# Patient Record
Sex: FEMALE | Race: WHITE | URBAN - METROPOLITAN AREA
[De-identification: names, ages, dates, MRNs, and addresses within clinical notes are randomized per-mention and may not be internally consistent; named-entity substitution may affect disease eponyms.]

---

## 2017-03-02 ENCOUNTER — ON CAMPUS - OUTPATIENT (OUTPATIENT)
Dept: URBAN - METROPOLITAN AREA HOSPITAL 85 | Facility: HOSPITAL | Age: 52
End: 2017-03-02

## 2017-03-02 ENCOUNTER — HOSPITAL ENCOUNTER (OUTPATIENT)
Dept: PREOP | Facility: HOSPITAL | Age: 52
Setting detail: HOSPITAL OUTPATIENT SURGERY
Discharge: HOME | End: 2017-03-02
Attending: INTERNAL MEDICINE | Admitting: INTERNAL MEDICINE

## 2017-03-02 DIAGNOSIS — K57.90 DIVERTICULOSIS OF INTESTINE, PART UNSPECIFIED, WITHOUT PERFO: ICD-10-CM

## 2017-03-02 DIAGNOSIS — Z12.11 ENCOUNTER FOR SCREENING FOR MALIGNANT NEOPLASM OF COLON: ICD-10-CM

## 2017-03-02 DIAGNOSIS — K63.5 POLYP OF COLON: ICD-10-CM

## 2017-03-02 PROCEDURE — 45385 COLONOSCOPY W/LESION REMOVAL: CPT | Mod: 33 | Performed by: INTERNAL MEDICINE

## 2018-04-11 ENCOUNTER — TRANSCRIBE ORDERS (OUTPATIENT)
Dept: ADMINISTRATIVE | Facility: HOSPITAL | Age: 53
End: 2018-04-11

## 2018-04-11 DIAGNOSIS — R10.9 ABDOMINAL PAIN, UNSPECIFIED ABDOMINAL LOCATION: Primary | ICD-10-CM

## 2018-12-31 ENCOUNTER — HOSPITAL ENCOUNTER (OUTPATIENT)
Dept: FAMILY MEDICINE CLINIC | Facility: CLINIC | Age: 53
Setting detail: SPECIMEN
Discharge: HOME | End: 2018-12-31
Attending: NURSE PRACTITIONER | Admitting: NURSE PRACTITIONER

## 2018-12-31 LAB
ALBUMIN SERPL-MCNC: 3.8 G/DL (ref 3.5–4.8)
ALBUMIN/GLOB SERPL: 1.3 {RATIO} (ref 1–1.7)
ALP SERPL-CCNC: 52 IU/L (ref 32–91)
ALT SERPL-CCNC: 10 IU/L (ref 14–54)
ANION GAP SERPL CALC-SCNC: 14 MMOL/L (ref 10–20)
AST SERPL-CCNC: 17 IU/L (ref 15–41)
BASOPHILS # BLD AUTO: 0.1 10*3/UL (ref 0–0.2)
BASOPHILS NFR BLD AUTO: 1 % (ref 0–2)
BILIRUB SERPL-MCNC: 0.4 MG/DL (ref 0.3–1.2)
BUN SERPL-MCNC: 7 MG/DL (ref 8–20)
BUN/CREAT SERPL: 8.8 (ref 5.4–26.2)
CALCIUM SERPL-MCNC: 8.8 MG/DL (ref 8.9–10.3)
CHLORIDE SERPL-SCNC: 96 MMOL/L (ref 101–111)
CHOLEST SERPL-MCNC: 190 MG/DL
CHOLEST/HDLC SERPL: 3.3 {RATIO}
CONV CO2: 26 MMOL/L (ref 22–32)
CONV LDL CHOLESTEROL DIRECT: 128 MG/DL (ref 0–100)
CONV TOTAL PROTEIN: 6.7 G/DL (ref 6.1–7.9)
CREAT UR-MCNC: 0.8 MG/DL (ref 0.4–1)
DIFFERENTIAL METHOD BLD: (no result)
EOSINOPHIL # BLD AUTO: 0.3 10*3/UL (ref 0–0.3)
EOSINOPHIL # BLD AUTO: 3 % (ref 0–3)
ERYTHROCYTE [DISTWIDTH] IN BLOOD BY AUTOMATED COUNT: 13 % (ref 11.5–14.5)
GLOBULIN UR ELPH-MCNC: 2.9 G/DL (ref 2.5–3.8)
GLUCOSE SERPL-MCNC: 88 MG/DL (ref 65–99)
HCT VFR BLD AUTO: 39.8 % (ref 35–49)
HDLC SERPL-MCNC: 58 MG/DL
HGB BLD-MCNC: 13.3 G/DL (ref 12–15)
LDLC/HDLC SERPL: 2.2 {RATIO}
LIPID INTERPRETATION: ABNORMAL
LYMPHOCYTES # BLD AUTO: 1.2 10*3/UL (ref 0.8–4.8)
LYMPHOCYTES NFR BLD AUTO: 10 % (ref 18–42)
MCH RBC QN AUTO: 32.3 PG (ref 26–32)
MCHC RBC AUTO-ENTMCNC: 33.5 G/DL (ref 32–36)
MCV RBC AUTO: 96.3 FL (ref 80–94)
MONOCYTES # BLD AUTO: 0.7 10*3/UL (ref 0.1–1.3)
MONOCYTES NFR BLD AUTO: 6 % (ref 2–11)
NEUTROPHILS # BLD AUTO: 9.6 10*3/UL (ref 2.3–8.6)
NEUTROPHILS NFR BLD AUTO: 80 % (ref 50–75)
NRBC BLD AUTO-RTO: 0 /100{WBCS}
NRBC/RBC NFR BLD MANUAL: 0 10*3/UL
PLATELET # BLD AUTO: 341 10*3/UL (ref 150–450)
PMV BLD AUTO: 7.5 FL (ref 7.4–10.4)
POTASSIUM SERPL-SCNC: 4 MMOL/L (ref 3.6–5.1)
RBC # BLD AUTO: 4.13 10*6/UL (ref 4–5.4)
SODIUM SERPL-SCNC: 132 MMOL/L (ref 136–144)
TRIGL SERPL-MCNC: 117 MG/DL
TSH SERPL-ACNC: 1 UIU/ML (ref 0.34–5.6)
VLDLC SERPL CALC-MCNC: 4 MG/DL
WBC # BLD AUTO: 12 10*3/UL (ref 4.5–11.5)

## 2019-01-31 ENCOUNTER — HOSPITAL ENCOUNTER (OUTPATIENT)
Dept: GENERAL RADIOLOGY | Facility: HOSPITAL | Age: 54
Discharge: HOME | End: 2019-01-31
Attending: NURSE PRACTITIONER | Admitting: NURSE PRACTITIONER

## 2019-05-30 ENCOUNTER — CONVERSION ENCOUNTER (OUTPATIENT)
Dept: FAMILY MEDICINE CLINIC | Facility: CLINIC | Age: 54
End: 2019-05-30

## 2019-06-04 VITALS
SYSTOLIC BLOOD PRESSURE: 140 MMHG | DIASTOLIC BLOOD PRESSURE: 76 MMHG | RESPIRATION RATE: 16 BRPM | WEIGHT: 228.38 LBS | BODY MASS INDEX: 32.77 KG/M2 | HEART RATE: 82 BPM

## 2019-06-07 NOTE — PROGRESS NOTES
History of Present Illness   History from: patient  Reason for visit: New pt HMR clinical  Chief Complaint: obesity  History of Present Illness: 55 y/o female; medical hx includes: obesity; HTN;       Vital Signs:    Patient Profile:    54 Years Old Female  CC:         obesity  Height:     70 inches  Weight:     228.38 pounds  (Measured Weight:  228 lbs. 6 oz.)  BMI:        32.89  Temp:       98.3 degrees F oral  Pulse rhythm:   regular  Resp:       16 per minute    Pulse Sittin /min  BP sittin / 76  (right arm)  Pulse Standin /min  BP standin / 80 (right arm)  Cuff size:  large   Vitals Entered By: Mara Rubio (May 30, 2019 2:49 PM)    Medications:  Medications were reviewed with the patient during this visit.    Allergies:   IBUPROFEN (IBUPROFEN) (Critical)  * LISNOPRIL (Critical)    Allergies were reviewed with the patient during this visit.          Blood Pressure:  Today's BP: 140/76 mm Hg    Labwork:   Most Recent Lab Results:   LDL: 128 mg/dL 2018      Treatment Plan (choose one)   Minimum Meal Replacement Prescriptions   Shakes per day: 4  Entrees per day: 2  HMR Multivitamins a day 2  Fruits & Vegetables per day 0  Potassium prescription given to patient N/A  Physical activity recommendations Limitations No  Special Instructions: pt instructed in  4+2 diet with emphasis on MIB: stated she understood instructions; reviewed potential side effects of diet including but not limited to: dizziness, lightheadedness, constipation, muscle cramps, fatigue,   anemia, menstrual irregularites and gallbladder disease; pt stated she understood; pt currently taking HCTZ for BP; discussed that she will probably have to discontinue this; states she had labs done in 2019; consent signed; plans to attend Thurs 1pm   class; will forward to Dr. ASIF Jeronimo for review    Active Medications (reviewed today):  BACTRIM -160 MG ORAL TABLET (SULFAMETHOXAZOLE-TRIMETHOPRIM) 1 po  bid  CONTRAVE 8-90 MG ORAL TABLET EXTENDED RELEASE 12 HOUR (NALTREXONE-BUPROPION HCL) one daily for 1 week then one bid for 1 week then 2 in am 1 pm for 1 week then two bid on going  HYDROCHLOROTHIAZIDE 25 MG TABLET (HYDROCHLOROTHIAZIDE) TAKE ONE TABLET BY MOUTH DAILY    Current Allergies (reviewed today):  IBUPROFEN (IBUPROFEN) (Critical)  * LISNOPRIL (Critical)      Medications Added to Medication List This Visit:  1)  Hmr Multivitamin  .... Take one (1) tablet by mouth twice a day        Electronically signed by Mara Rubio on 05/30/2019 at 3:39 PM  ________________________________________________________________________  Chart, meds & labs reviewed. Ok to start HMR program. Stop HCTZ. Medical management no required.      Electronically signed by Cholo Jeronimo MD on 05/31/2019 at 2:31 PM  ________________________________________________________________________       Disclaimer: Converted Note message may not contain all data elements that existed in the legacy source system. Please see [facility] for the original note details.

## 2020-01-14 ENCOUNTER — TELEPHONE (OUTPATIENT)
Dept: FAMILY MEDICINE CLINIC | Facility: CLINIC | Age: 55
End: 2020-01-14

## 2020-01-14 NOTE — TELEPHONE ENCOUNTER
Patient left a voice message that she had previously been taking Contrave, but stopped because the higher dosing made her dizzy.  Since then she has been doing some research and has read that people are doing well with taking 1-2 pills daily lower dose pills.  What do you think?  She is thinking about getting back on it.

## 2020-01-17 ENCOUNTER — TELEPHONE (OUTPATIENT)
Dept: FAMILY MEDICINE CLINIC | Facility: CLINIC | Age: 55
End: 2020-01-17

## 2020-04-23 RX ORDER — HYDROCHLOROTHIAZIDE 25 MG/1
TABLET ORAL
Qty: 90 TABLET | Refills: 2 | Status: SHIPPED | OUTPATIENT
Start: 2020-04-23 | End: 2021-06-01

## 2020-07-29 ENCOUNTER — TELEPHONE (OUTPATIENT)
Dept: FAMILY MEDICINE CLINIC | Facility: CLINIC | Age: 55
End: 2020-07-29

## 2020-07-29 NOTE — TELEPHONE ENCOUNTER
Spoke with patient and scheduled a physical with [facility] on 08/13/2020 at 10:30am.

## 2020-07-29 NOTE — TELEPHONE ENCOUNTER
Caller: Marjorie Maldonado    Relationship to patient: Self    Best call back number: [phone]    Chief complaint: INSURANCE WANTS HER TO GET A BIOMETRIC SCREENING FOR THE  PROGRAM    Type of visit: NOT SURE IF SHE NEEDS A VISIT OR JUST LAB WORK - PLEASE ADVISE AND SCHEDULE

## 2020-08-04 PROCEDURE — U0003 INFECTIOUS AGENT DETECTION BY NUCLEIC ACID (DNA OR RNA); SEVERE ACUTE RESPIRATORY SYNDROME CORONAVIRUS 2 (SARS-COV-2) (CORONAVIRUS DISEASE [COVID-19]), AMPLIFIED PROBE TECHNIQUE, MAKING USE OF HIGH THROUGHPUT TECHNOLOGIES AS DESCRIBED BY CMS-2020-01-R: HCPCS | Performed by: FAMILY MEDICINE

## 2020-08-06 ENCOUNTER — TELEPHONE (OUTPATIENT)
Dept: FAMILY MEDICINE CLINIC | Facility: CLINIC | Age: 55
End: 2020-08-06

## 2020-08-06 NOTE — TELEPHONE ENCOUNTER
PATIENT STATES SHE WENT TO URGENT CARE AND SHE GOT A POSITIVE COVID TEST . SHE WANTED TO KNOW IF SHE NEEDED ANY TREATMENT. SHE IS ON DAY 6, HER FEVER IS BETTER TODAY BUT HER CHEST STILL HURTS.    PLEASE ADVISE    [phone]

## 2020-08-07 NOTE — TELEPHONE ENCOUNTER
Spoke with pt. She did not have a chest xray done. She is pushing fluids, resting and tylenol/ibu prn

## 2020-08-27 ENCOUNTER — OFFICE VISIT (OUTPATIENT)
Dept: FAMILY MEDICINE CLINIC | Facility: CLINIC | Age: 55
End: 2020-08-27

## 2020-08-27 ENCOUNTER — LAB (OUTPATIENT)
Dept: FAMILY MEDICINE CLINIC | Facility: CLINIC | Age: 55
End: 2020-08-27

## 2020-08-27 VITALS
HEART RATE: 78 BPM | DIASTOLIC BLOOD PRESSURE: 90 MMHG | TEMPERATURE: 96.6 F | SYSTOLIC BLOOD PRESSURE: 128 MMHG | BODY MASS INDEX: 29.92 KG/M2 | WEIGHT: 209 LBS | HEIGHT: 70 IN

## 2020-08-27 DIAGNOSIS — Z12.39 SCREENING FOR BREAST CANCER: ICD-10-CM

## 2020-08-27 DIAGNOSIS — Z00.00 PREVENTATIVE HEALTH CARE: Primary | ICD-10-CM

## 2020-08-27 DIAGNOSIS — I10 ESSENTIAL HYPERTENSION: ICD-10-CM

## 2020-08-27 DIAGNOSIS — Z00.00 PREVENTATIVE HEALTH CARE: ICD-10-CM

## 2020-08-27 LAB
25(OH)D3 SERPL-MCNC: 44.1 NG/ML (ref 30–100)
ALBUMIN SERPL-MCNC: 3.9 G/DL (ref 3.5–5.2)
ALBUMIN/GLOB SERPL: 1.3 G/DL
ALP SERPL-CCNC: 69 U/L (ref 39–117)
ALT SERPL W P-5'-P-CCNC: 11 U/L (ref 1–33)
ANION GAP SERPL CALCULATED.3IONS-SCNC: 7.5 MMOL/L (ref 5–15)
AST SERPL-CCNC: 16 U/L (ref 1–32)
BASOPHILS # BLD AUTO: 0.03 10*3/MM3 (ref 0–0.2)
BASOPHILS NFR BLD AUTO: 0.5 % (ref 0–1.5)
BILIRUB SERPL-MCNC: 0.3 MG/DL (ref 0–1.2)
BUN SERPL-MCNC: 11 MG/DL (ref 6–20)
BUN/CREAT SERPL: 15.5 (ref 7–25)
CALCIUM SPEC-SCNC: 9 MG/DL (ref 8.6–10.5)
CHLORIDE SERPL-SCNC: 103 MMOL/L (ref 98–107)
CHOLEST SERPL-MCNC: 185 MG/DL (ref 0–200)
CO2 SERPL-SCNC: 27.5 MMOL/L (ref 22–29)
CREAT SERPL-MCNC: 0.71 MG/DL (ref 0.57–1)
DEPRECATED RDW RBC AUTO: 46.3 FL (ref 37–54)
EOSINOPHIL # BLD AUTO: 0.21 10*3/MM3 (ref 0–0.4)
EOSINOPHIL NFR BLD AUTO: 3.4 % (ref 0.3–6.2)
ERYTHROCYTE [DISTWIDTH] IN BLOOD BY AUTOMATED COUNT: 12.7 % (ref 12.3–15.4)
GFR SERPL CREATININE-BSD FRML MDRD: 86 ML/MIN/1.73
GLOBULIN UR ELPH-MCNC: 3.1 GM/DL
GLUCOSE SERPL-MCNC: 84 MG/DL (ref 65–99)
HCT VFR BLD AUTO: 38.4 % (ref 34–46.6)
HDLC SERPL-MCNC: 53 MG/DL (ref 40–60)
HGB BLD-MCNC: 12.2 G/DL (ref 12–15.9)
IMM GRANULOCYTES # BLD AUTO: 0.02 10*3/MM3 (ref 0–0.05)
IMM GRANULOCYTES NFR BLD AUTO: 0.3 % (ref 0–0.5)
LDLC SERPL CALC-MCNC: 112 MG/DL (ref 0–100)
LDLC/HDLC SERPL: 2.11 {RATIO}
LYMPHOCYTES # BLD AUTO: 1.21 10*3/MM3 (ref 0.7–3.1)
LYMPHOCYTES NFR BLD AUTO: 19.5 % (ref 19.6–45.3)
MCH RBC QN AUTO: 31 PG (ref 26.6–33)
MCHC RBC AUTO-ENTMCNC: 31.8 G/DL (ref 31.5–35.7)
MCV RBC AUTO: 97.7 FL (ref 79–97)
MONOCYTES # BLD AUTO: 0.56 10*3/MM3 (ref 0.1–0.9)
MONOCYTES NFR BLD AUTO: 9 % (ref 5–12)
NEUTROPHILS NFR BLD AUTO: 4.16 10*3/MM3 (ref 1.7–7)
NEUTROPHILS NFR BLD AUTO: 67.3 % (ref 42.7–76)
NRBC BLD AUTO-RTO: 0 /100 WBC (ref 0–0.2)
PLATELET # BLD AUTO: 294 10*3/MM3 (ref 140–450)
PMV BLD AUTO: 9.9 FL (ref 6–12)
POTASSIUM SERPL-SCNC: 4.4 MMOL/L (ref 3.5–5.2)
PROT SERPL-MCNC: 7 G/DL (ref 6–8.5)
RBC # BLD AUTO: 3.93 10*6/MM3 (ref 3.77–5.28)
SODIUM SERPL-SCNC: 138 MMOL/L (ref 136–145)
TRIGL SERPL-MCNC: 102 MG/DL (ref 0–150)
TSH SERPL DL<=0.05 MIU/L-ACNC: 2.26 UIU/ML (ref 0.27–4.2)
VLDLC SERPL-MCNC: 20.4 MG/DL (ref 5–40)
WBC # BLD AUTO: 6.19 10*3/MM3 (ref 3.4–10.8)

## 2020-08-27 PROCEDURE — 84443 ASSAY THYROID STIM HORMONE: CPT | Performed by: NURSE PRACTITIONER

## 2020-08-27 PROCEDURE — 80061 LIPID PANEL: CPT | Performed by: NURSE PRACTITIONER

## 2020-08-27 PROCEDURE — 99213 OFFICE O/P EST LOW 20 MIN: CPT | Performed by: NURSE PRACTITIONER

## 2020-08-27 PROCEDURE — 85025 COMPLETE CBC W/AUTO DIFF WBC: CPT | Performed by: NURSE PRACTITIONER

## 2020-08-27 PROCEDURE — 82306 VITAMIN D 25 HYDROXY: CPT | Performed by: NURSE PRACTITIONER

## 2020-08-27 PROCEDURE — 80053 COMPREHEN METABOLIC PANEL: CPT | Performed by: NURSE PRACTITIONER

## 2020-08-27 PROCEDURE — 99396 PREV VISIT EST AGE 40-64: CPT | Performed by: NURSE PRACTITIONER

## 2020-08-27 NOTE — ASSESSMENT & PLAN NOTE
Hypertension is worsening.  Dietary sodium restriction.  Weight loss.  Regular aerobic exercise.  Medication changes per orders.  Blood pressure will be reassessed in 4 weeks.  Repeat /96. Will restart HCTZ at 1/2 tab daily and follow up in 4 weeks.

## 2020-08-27 NOTE — PROGRESS NOTES
"Subjective   Marjorie Maldonado is a 55 y.o. female.     Chief Complaint   Patient presents with   • Annual Exam       /90 (BP Location: Right arm, Patient Position: Sitting, Cuff Size: Adult)   Pulse 78   Temp 96.6 °F (35.9 °C) (Temporal)   Ht 177.8 cm (70\")   Wt 94.8 kg (209 lb)   BMI 29.99 kg/m²     BP Readings from Last 3 Encounters:   20 128/90   20 163/94   20 135/88       Wt Readings from Last 3 Encounters:   20 94.8 kg (209 lb)   20 90.7 kg (200 lb)   20 90.7 kg (200 lb)       Pt comes in today for routine physical. She stopped taking her HCTZ about 6 months ago. Just didn't feel she needed it anymore and had heard it was dangerous to stay on BP meds long term.   She denies monitoring BP. Denies any symptoms of HTN    Recently diagnosed with covid. Feeling much better now. States several co-workers tested positive.        Past Surgical History:   Procedure Laterality Date   •  SECTION      x3        Social History     Socioeconomic History   • Marital status:      Spouse name: Not on file   • Number of children: 3   • Years of education: Not on file   • Highest education level: Not on file   Occupational History     Employer: [employer]   Tobacco Use   • Smoking status: Never Smoker   • Smokeless tobacco: Never Used   Substance and Sexual Activity   • Alcohol use: Never     Frequency: Never   • Drug use: Defer   • Sexual activity: Defer       The following portions of the patient's history were reviewed and updated as appropriate: allergies, current medications, past family history, past medical history, past social history, past surgical history and problem list.    Review of Systems   Constitutional: Negative for activity change, unexpected weight gain and unexpected weight loss.   Eyes: Negative for visual disturbance.   Respiratory: Negative for chest tightness and shortness of breath.    Cardiovascular: Negative for chest pain, " palpitations and leg swelling.   Gastrointestinal: Negative for abdominal pain, blood in stool, constipation, diarrhea and indigestion.   Endocrine: Negative for polydipsia and polyuria.   Genitourinary: Negative for difficulty urinating.   Skin: Negative for skin lesions.   Neurological: Negative for headache.   Psychiatric/Behavioral: Negative for agitation, sleep disturbance and stress.       Objective   Physical Exam   Constitutional: She is oriented to person, place, and time. She appears well-developed.   HENT:   Head: Normocephalic.   Eyes: Pupils are equal, round, and reactive to light. Conjunctivae are normal.   Neck: Neck supple. No thyromegaly present.   Cardiovascular: Normal rate and regular rhythm.   No murmur heard.  Pulmonary/Chest: Effort normal and breath sounds normal.   Abdominal: Soft. Bowel sounds are normal. She exhibits no mass. There is no tenderness.   Neurological: She is alert and oriented to person, place, and time.   Skin: Skin is warm and dry. No lesion noted.   Psychiatric: She has a normal mood and affect. Her behavior is normal.       Diagnoses and all orders for this visit:    1. Preventative health care (Primary)  -     CBC & Differential; Future  -     Comprehensive Metabolic Panel; Future  -     TSH; Future  -     Lipid Panel; Future  -     Vitamin D 25 Hydroxy; Future    2. Screening for breast cancer  -     Mammo Screening Digital Tomosynthesis Bilateral With CAD; Future    3. Essential hypertension  Assessment & Plan:  Hypertension is worsening.  Dietary sodium restriction.  Weight loss.  Regular aerobic exercise.  Medication changes per orders.  Blood pressure will be reassessed in 4 weeks.  Repeat /96. Will restart HCTZ at 1/2 tab daily and follow up in 4 weeks.     check labs  Mammogram in Jan  Restart HCTZ 12.5mg daily  Discussed importance of regular exercise and recommended starting or continuing a regular exercise program for good health. The patient was also  encouraged to lose weight for better health.   During this visit for their annual exam, we reviewed their personal history, social history and family history. We went over their medications and all the recommended health maintenance items for their age group. They were given the opportunity to ask questions and discuss other concerns.       Return in about 4 weeks (around 9/24/2020) for HTN follow up.

## 2021-06-01 RX ORDER — HYDROCHLOROTHIAZIDE 25 MG/1
TABLET ORAL
Qty: 90 TABLET | Refills: 1 | Status: SHIPPED | OUTPATIENT
Start: 2021-06-01 | End: 2021-11-08

## 2021-06-14 ENCOUNTER — TELEPHONE (OUTPATIENT)
Dept: FAMILY MEDICINE CLINIC | Facility: CLINIC | Age: 56
End: 2021-06-14

## 2021-06-14 NOTE — TELEPHONE ENCOUNTER
Spoke with patient and told her that she can stop by any time, except between 12-1pm, and have her bp checked by one of the medical assistants.

## 2021-06-14 NOTE — TELEPHONE ENCOUNTER
Caller: CHADDMarjorie    Relationship to patient: Self    Best call back number: [phone]     Chief complaint: BLOOD PRESSURE CHECK       Requested date: TODAY , TOMORROW OR WEDNSDAY ,THURSDAY      Additional notes:MS FLORENTINO WOULD LIKE TO KNOW IF SHE COULD COME BY OFFICE FROM TIME TO TIME TO HAVE HER BLOOD PRESSURE CHECKED.  WALK IN OR NURSE SCHEDULE?

## 2021-11-06 ENCOUNTER — HOSPITAL ENCOUNTER (EMERGENCY)
Facility: HOSPITAL | Age: 56
Discharge: HOME | End: 2021-11-06
Admitting: EMERGENCY MEDICINE

## 2021-11-06 ENCOUNTER — APPOINTMENT (OUTPATIENT)
Dept: CT IMAGING | Facility: HOSPITAL | Age: 56
End: 2021-11-06

## 2021-11-06 VITALS
OXYGEN SATURATION: 97 % | WEIGHT: 219.14 LBS | RESPIRATION RATE: 16 BRPM | HEIGHT: 70 IN | DIASTOLIC BLOOD PRESSURE: 78 MMHG | BODY MASS INDEX: 31.37 KG/M2 | HEART RATE: 88 BPM | TEMPERATURE: 98 F | SYSTOLIC BLOOD PRESSURE: 138 MMHG

## 2021-11-06 DIAGNOSIS — R53.1 WEAKNESS: Primary | ICD-10-CM

## 2021-11-06 DIAGNOSIS — Z71.1 PHYSICALLY WELL BUT WORRIED: ICD-10-CM

## 2021-11-06 LAB
ALBUMIN SERPL-MCNC: 4.3 G/DL (ref 3.5–5.2)
ALBUMIN/GLOB SERPL: 1.5 G/DL
ALP SERPL-CCNC: 72 U/L (ref 39–117)
ALT SERPL W P-5'-P-CCNC: 9 U/L (ref 1–33)
ANION GAP SERPL CALCULATED.3IONS-SCNC: 13 MMOL/L (ref 5–15)
AST SERPL-CCNC: 18 U/L (ref 1–32)
BASOPHILS # BLD AUTO: 0.1 10*3/MM3 (ref 0–0.2)
BASOPHILS NFR BLD AUTO: 0.6 % (ref 0–1.5)
BILIRUB SERPL-MCNC: 0.3 MG/DL (ref 0–1.2)
BUN SERPL-MCNC: 11 MG/DL (ref 6–20)
BUN/CREAT SERPL: 17.7 (ref 7–25)
CALCIUM SPEC-SCNC: 9 MG/DL (ref 8.6–10.5)
CHLORIDE SERPL-SCNC: 100 MMOL/L (ref 98–107)
CO2 SERPL-SCNC: 25 MMOL/L (ref 22–29)
CREAT SERPL-MCNC: 0.62 MG/DL (ref 0.57–1)
DEPRECATED RDW RBC AUTO: 41.6 FL (ref 37–54)
EOSINOPHIL # BLD AUTO: 0 10*3/MM3 (ref 0–0.4)
EOSINOPHIL NFR BLD AUTO: 0.2 % (ref 0.3–6.2)
ERYTHROCYTE [DISTWIDTH] IN BLOOD BY AUTOMATED COUNT: 12.8 % (ref 12.3–15.4)
GFR SERPL CREATININE-BSD FRML MDRD: 100 ML/MIN/1.73
GLOBULIN UR ELPH-MCNC: 2.8 GM/DL
GLUCOSE BLDC GLUCOMTR-MCNC: 148 MG/DL (ref 70–105)
GLUCOSE SERPL-MCNC: 107 MG/DL (ref 65–99)
HCT VFR BLD AUTO: 37.2 % (ref 34–46.6)
HGB BLD-MCNC: 13 G/DL (ref 12–15.9)
HOLD SPECIMEN: NORMAL
INR PPP: 0.99 (ref 0.93–1.1)
LYMPHOCYTES # BLD AUTO: 1.2 10*3/MM3 (ref 0.7–3.1)
LYMPHOCYTES NFR BLD AUTO: 12.7 % (ref 19.6–45.3)
MAGNESIUM SERPL-MCNC: 2 MG/DL (ref 1.6–2.6)
MCH RBC QN AUTO: 32.3 PG (ref 26.6–33)
MCHC RBC AUTO-ENTMCNC: 34.8 G/DL (ref 31.5–35.7)
MCV RBC AUTO: 92.8 FL (ref 79–97)
MONOCYTES # BLD AUTO: 0.5 10*3/MM3 (ref 0.1–0.9)
MONOCYTES NFR BLD AUTO: 5.1 % (ref 5–12)
NEUTROPHILS NFR BLD AUTO: 7.6 10*3/MM3 (ref 1.7–7)
NEUTROPHILS NFR BLD AUTO: 81.4 % (ref 42.7–76)
NRBC BLD AUTO-RTO: 0 /100 WBC (ref 0–0.2)
PLATELET # BLD AUTO: 340 10*3/MM3 (ref 140–450)
PMV BLD AUTO: 6.6 FL (ref 6–12)
POTASSIUM SERPL-SCNC: 4 MMOL/L (ref 3.5–5.2)
PROT SERPL-MCNC: 7.1 G/DL (ref 6–8.5)
PROTHROMBIN TIME: 11 SECONDS (ref 9.6–11.7)
RBC # BLD AUTO: 4.01 10*6/MM3 (ref 3.77–5.28)
SODIUM SERPL-SCNC: 138 MMOL/L (ref 136–145)
TSH SERPL DL<=0.05 MIU/L-ACNC: 0.72 UIU/ML (ref 0.27–4.2)
WBC # BLD AUTO: 9.3 10*3/MM3 (ref 3.4–10.8)

## 2021-11-06 PROCEDURE — 72125 CT NECK SPINE W/O DYE: CPT

## 2021-11-06 PROCEDURE — 82962 GLUCOSE BLOOD TEST: CPT

## 2021-11-06 PROCEDURE — 85610 PROTHROMBIN TIME: CPT | Performed by: NURSE PRACTITIONER

## 2021-11-06 PROCEDURE — 36415 COLL VENOUS BLD VENIPUNCTURE: CPT

## 2021-11-06 PROCEDURE — 70450 CT HEAD/BRAIN W/O DYE: CPT

## 2021-11-06 PROCEDURE — 80053 COMPREHEN METABOLIC PANEL: CPT | Performed by: NURSE PRACTITIONER

## 2021-11-06 PROCEDURE — 85025 COMPLETE CBC W/AUTO DIFF WBC: CPT | Performed by: NURSE PRACTITIONER

## 2021-11-06 PROCEDURE — 99283 EMERGENCY DEPT VISIT LOW MDM: CPT

## 2021-11-06 PROCEDURE — 83735 ASSAY OF MAGNESIUM: CPT | Performed by: NURSE PRACTITIONER

## 2021-11-06 PROCEDURE — 84443 ASSAY THYROID STIM HORMONE: CPT | Performed by: NURSE PRACTITIONER

## 2021-11-06 RX ORDER — SODIUM CHLORIDE 0.9 % (FLUSH) 0.9 %
10 SYRINGE (ML) INJECTION AS NEEDED
Status: DISCONTINUED | OUTPATIENT
Start: 2021-11-06 | End: 2021-11-06 | Stop reason: HOSPADM

## 2021-11-06 NOTE — ED PROVIDER NOTES
"Subjective   56-year-old female presents with a 2-week history of \"my voice feels hoarse and weak, and for 1 week I have not felt steady on both my feet.  It feels like it something neurological.\"  She reports that she had a mechanical fall after a trip from the standing position on 10/22/2021.  She denies LOC at that time.  Denies any blood thinners.  Reports that she had hit her chin at that time.  She denies any recurrent falls since then.  She denies associated headache, blurred vision, speech difficulties.  She reports that she has also been self weaning from her antianxiolytic last 5 days.    1. Location: Lower extremities  2. Quality: Weak  3. Severity: Moderate  4. Worsening factors: Weightbearing  5. Alleviating factors: Rest  6. Onset: 1-2 weeks  7. Radiation: Denies  8. Frequency: Constant  9. Co-morbidities: Past Medical History:  No date: Hypertension  10. Source: Patient            Review of Systems   Constitutional: Negative for chills, diaphoresis and fever.   HENT: Negative for ear discharge and rhinorrhea.    Eyes: Negative for photophobia, pain and visual disturbance.   Musculoskeletal: Negative for gait problem.   Skin: Negative for color change, pallor and rash.   Neurological: Negative for dizziness, tremors, syncope, facial asymmetry, speech difficulty, weakness, numbness and headaches.   Psychiatric/Behavioral: Negative for agitation and confusion.   All other systems reviewed and are negative.      Past Medical History:   Diagnosis Date   • Hypertension        Allergies   Allergen Reactions   • Ibuprofen Angioedema   • Lisinopril Hives       Past Surgical History:   Procedure Laterality Date   •  SECTION      x3        Family History   Problem Relation Age of Onset   • COPD Mother    • Coronary artery disease Mother    • Stroke Father    • Hypertension Father    • Kidney disease Father    • Hypertension Sister    • Stroke Maternal Grandmother    • Stroke Paternal Grandmother  "       Social History     Socioeconomic History   • Marital status:    • Number of children: 3   Tobacco Use   • Smoking status: Never Smoker   • Smokeless tobacco: Never Used   Substance and Sexual Activity   • Alcohol use: Never   • Drug use: Defer   • Sexual activity: Defer           Objective   Physical Exam  Vitals and nursing note reviewed.   Constitutional:       General: She is awake. She is not in acute distress.     Appearance: Normal appearance. She is well-developed and normal weight. She is not toxic-appearing or diaphoretic.   HENT:      Head: Normocephalic and atraumatic.      Right Ear: External ear normal.      Left Ear: External ear normal.      Nose: Nose normal.   Eyes:      Extraocular Movements: Extraocular movements intact.      Conjunctiva/sclera: Conjunctivae normal.      Pupils: Pupils are equal, round, and reactive to light.   Neck:      Trachea: Phonation normal.      Meningeal: Brudzinski's sign and Kernig's sign absent.   Cardiovascular:      Rate and Rhythm: Normal rate and regular rhythm.      Pulses: Normal pulses.           Radial pulses are 2+ on the right side and 2+ on the left side.        Dorsalis pedis pulses are 2+ on the right side and 2+ on the left side.        Posterior tibial pulses are 2+ on the right side and 2+ on the left side.      Heart sounds: Normal heart sounds, S1 normal and S2 normal. Heart sounds not distant. No murmur heard.  No friction rub. No gallop.    Pulmonary:      Effort: Pulmonary effort is normal.      Breath sounds: Normal breath sounds and air entry.   Musculoskeletal:         General: Normal range of motion.      Cervical back: Full passive range of motion without pain, normal range of motion and neck supple. No erythema or rigidity. No spinous process tenderness. Normal range of motion.   Skin:     General: Skin is warm and dry.      Capillary Refill: Capillary refill takes less than 2 seconds.   Neurological:      General: No focal  deficit present.      Mental Status: She is alert and oriented to person, place, and time.      GCS: GCS eye subscore is 4. GCS verbal subscore is 5. GCS motor subscore is 6.      Cranial Nerves: Cranial nerves are intact. No cranial nerve deficit.      Sensory: Sensation is intact. No sensory deficit.      Motor: Motor function is intact. No weakness or abnormal muscle tone.      Coordination: Coordination is intact. Coordination normal. Finger-Nose-Finger Test and Heel to Shin Test normal.      Deep Tendon Reflexes: Reflexes normal.      Reflex Scores:       Patellar reflexes are 2+ on the right side and 2+ on the left side.  Psychiatric:         Mood and Affect: Mood normal.         Behavior: Behavior normal. Behavior is cooperative.         Thought Content: Thought content normal.         Judgment: Judgment normal.         Procedures           ED Course      CT Head Without Contrast    Result Date: 11/6/2021  1. No acute intracranial abnormality. Specifically, no evidence of acute hemorrhage, mass effect or midline shift. No change from 7/2/2015.  Electronically Signed By-Juan Francisco Pollard MD On:11/6/2021 6:00 PM This report was finalized on [id number] by  Juan Francisco Pollard MD.    CT Cervical Spine Without Contrast    Result Date: 11/6/2021  1. No acute fracture or traumatic subluxation. 2. Degenerative disc disease at C5-C6 and C6-C7 with endplate osteophyte formation.  Electronically Signed By-Juan Francisco Pollard MD On:11/6/2021 6:08 PM This report was finalized on [id number] by  Juan Francisco Pollard MD.    Medications   sodium chloride 0.9 % flush 10 mL (has no administration in time range)     Labs Reviewed   COMPREHENSIVE METABOLIC PANEL - Abnormal; Notable for the following components:       Result Value    Glucose 107 (*)     All other components within normal limits    Narrative:     GFR Normal >60  Chronic Kidney Disease <60  Kidney Failure <15     CBC WITH AUTO DIFFERENTIAL - Abnormal; Notable for the  following components:    Neutrophil % 81.4 (*)     Lymphocyte % 12.7 (*)     Eosinophil % 0.2 (*)     Neutrophils, Absolute 7.60 (*)     All other components within normal limits   POCT GLUCOSE FINGERSTICK - Abnormal; Notable for the following components:    Glucose 148 (*)     All other components within normal limits   PROTIME-INR - Normal   MAGNESIUM - Normal   TSH - Normal   CBC AND DIFFERENTIAL    Narrative:     The following orders were created for panel order CBC & Differential.  Procedure                               Abnormality         Status                     ---------                               -----------         ------                     CBC Auto Differential[[id number]]        Abnormal            Final result                 Please view results for these tests on the individual orders.   EXTRA TUBES    Narrative:     The following orders were created for panel order Extra Tubes.  Procedure                               Abnormality         Status                     ---------                               -----------         ------                     Green Top (Gel)[[id number]]                                  Final result                 Please view results for these tests on the individual orders.   GREEN TOP                                          MDM  Number of Diagnoses or Management Options  Physically well but worried  Weakness  Diagnosis management comments: Chart Review: 8/27/2020 patient was seen by primary care for annual wellness visit.  Comorbidity: Past Medical History:  No date: Hypertension  Imaging: Was interpreted by physician and reviewed by myself: CT Head Without Contrast   Final Result    1. No acute intracranial abnormality. Specifically, no evidence of acute    hemorrhage, mass effect or midline shift. No change from 7/2/2015.         Electronically Signed By-Juan Francisco Pollard MD On:11/6/2021 6:00 PM    This report was finalized on [id number] by  Juan Francisco Pollard MD.    "  CT Cervical Spine Without Contrast   Final Result    1. No acute fracture or traumatic subluxation.    2. Degenerative disc disease at C5-C6 and C6-C7 with endplate osteophyte    formation.         Electronically Signed By-Juan Francisco Pollard MD On:11/6/2021 6:08 PM    This report was finalized on [id number] by  Juan Francisco Pollard MD.    Patient undressed and placed in gown for exam.  Appropriate PPE worn during patient exam. 56-year-old female presents with a 2-week history of \"my voice feels hoarse and weak, and for 1 week I have not felt steady on both my feet.  It feels like it something neurological.\"  She reports that she had a mechanical fall after a trip from the standing position on 10/22/2021.  She denies LOC at that time.  Denies any blood thinners.  Reports that she had hit her chin at that time.  She denies any recurrent falls since then.  She denies associated headache, blurred vision, speech difficulties.  She reports that she has also been self weaning from her antianxiolytic last 5 days.  Nothing focal on exam.  No ataxia noted.  DTRs strong and equal bilaterally 2+.  CT of the head and cervical spine without IV contrast obtained with the above findings.  No acute findings on imaging.  Labs unremarkable.  Patient is pink warm and dry no distress noted her vital signs are stable.     Disposition/Treatment: Discussed results with patient, verbalized understanding.  Discussed reasons to return to the ER, patient verbalized understanding.  Agreeable with plan of care.  Patient was stable upon discharge.       Part of this note may be an electronic transcription/translation of spoken language to printed text using the Dragon Dictation System.            Amount and/or Complexity of Data Reviewed  Clinical lab tests: reviewed  Tests in the radiology section of CPT®: reviewed    Patient Progress  Patient progress: stable      Final diagnoses:   Weakness   Physically well but worried       ED " Disposition  ED Disposition     ED Disposition Condition Comment    Discharge Stable           Lurdes West, APRN  [address]  [address]  [facility] [address]  [phone]    Schedule an appointment as soon as possible for a visit       [facility] EMERGENCY DEPARTMENT  [facility] [address] [phone]  Go to   If symptoms worsen         Medication List      No changes were made to your prescriptions during this visit.          Angelica Kelley, APRN  11/06/21 1959

## 2021-11-06 NOTE — DISCHARGE INSTRUCTIONS
Continue home medication regimen.  Keep your scheduled follow-up with your primary care physician for recheck.  Return to ER for new or worsening symptoms.

## 2021-11-06 NOTE — ED NOTES
Pt c/o legs feeling stiff when she initially gets up to walk.     Belia Morrissey, CHRISTYN  11/06/21 155

## 2021-11-08 ENCOUNTER — OFFICE VISIT (OUTPATIENT)
Dept: FAMILY MEDICINE CLINIC | Facility: CLINIC | Age: 56
End: 2021-11-08

## 2021-11-08 ENCOUNTER — LAB (OUTPATIENT)
Dept: FAMILY MEDICINE CLINIC | Facility: CLINIC | Age: 56
End: 2021-11-08

## 2021-11-08 VITALS
HEART RATE: 89 BPM | TEMPERATURE: 97.8 F | HEIGHT: 70 IN | OXYGEN SATURATION: 99 % | BODY MASS INDEX: 30.64 KG/M2 | SYSTOLIC BLOOD PRESSURE: 145 MMHG | WEIGHT: 214 LBS | DIASTOLIC BLOOD PRESSURE: 85 MMHG

## 2021-11-08 DIAGNOSIS — Z00.00 PREVENTATIVE HEALTH CARE: ICD-10-CM

## 2021-11-08 DIAGNOSIS — Z00.00 PREVENTATIVE HEALTH CARE: Primary | ICD-10-CM

## 2021-11-08 DIAGNOSIS — I10 PRIMARY HYPERTENSION: ICD-10-CM

## 2021-11-08 DIAGNOSIS — Z12.31 ENCOUNTER FOR SCREENING MAMMOGRAM FOR MALIGNANT NEOPLASM OF BREAST: ICD-10-CM

## 2021-11-08 DIAGNOSIS — R29.898 WEAKNESS OF BOTH LOWER EXTREMITIES: ICD-10-CM

## 2021-11-08 LAB
25(OH)D3 SERPL-MCNC: 43.3 NG/ML (ref 30–100)
ALBUMIN SERPL-MCNC: 4.5 G/DL (ref 3.5–5.2)
ALBUMIN/GLOB SERPL: 1.5 G/DL
ALP SERPL-CCNC: 62 U/L (ref 39–117)
ALT SERPL W P-5'-P-CCNC: 10 U/L (ref 1–33)
ANION GAP SERPL CALCULATED.3IONS-SCNC: 8.7 MMOL/L (ref 5–15)
AST SERPL-CCNC: 18 U/L (ref 1–32)
BILIRUB SERPL-MCNC: 0.3 MG/DL (ref 0–1.2)
BUN SERPL-MCNC: 10 MG/DL (ref 6–20)
BUN/CREAT SERPL: 12 (ref 7–25)
CALCIUM SPEC-SCNC: 9.7 MG/DL (ref 8.6–10.5)
CHLORIDE SERPL-SCNC: 99 MMOL/L (ref 98–107)
CHOLEST SERPL-MCNC: 220 MG/DL (ref 0–200)
CO2 SERPL-SCNC: 28.3 MMOL/L (ref 22–29)
CREAT SERPL-MCNC: 0.83 MG/DL (ref 0.57–1)
GFR SERPL CREATININE-BSD FRML MDRD: 71 ML/MIN/1.73
GLOBULIN UR ELPH-MCNC: 3.1 GM/DL
GLUCOSE SERPL-MCNC: 104 MG/DL (ref 65–99)
HBA1C MFR BLD: 5.4 % (ref 3.5–5.6)
HDLC SERPL-MCNC: 59 MG/DL (ref 40–60)
LDLC SERPL CALC-MCNC: 141 MG/DL (ref 0–100)
LDLC/HDLC SERPL: 2.35 {RATIO}
POTASSIUM SERPL-SCNC: 3.8 MMOL/L (ref 3.5–5.2)
PROT SERPL-MCNC: 7.6 G/DL (ref 6–8.5)
SODIUM SERPL-SCNC: 136 MMOL/L (ref 136–145)
TRIGL SERPL-MCNC: 111 MG/DL (ref 0–150)
VLDLC SERPL-MCNC: 20 MG/DL (ref 5–40)

## 2021-11-08 PROCEDURE — 83036 HEMOGLOBIN GLYCOSYLATED A1C: CPT | Performed by: NURSE PRACTITIONER

## 2021-11-08 PROCEDURE — 99396 PREV VISIT EST AGE 40-64: CPT | Performed by: NURSE PRACTITIONER

## 2021-11-08 PROCEDURE — 80053 COMPREHEN METABOLIC PANEL: CPT | Performed by: NURSE PRACTITIONER

## 2021-11-08 PROCEDURE — 99213 OFFICE O/P EST LOW 20 MIN: CPT | Performed by: NURSE PRACTITIONER

## 2021-11-08 PROCEDURE — 36415 COLL VENOUS BLD VENIPUNCTURE: CPT

## 2021-11-08 PROCEDURE — 82306 VITAMIN D 25 HYDROXY: CPT | Performed by: NURSE PRACTITIONER

## 2021-11-08 PROCEDURE — 80061 LIPID PANEL: CPT | Performed by: NURSE PRACTITIONER

## 2021-11-08 RX ORDER — LOSARTAN POTASSIUM 25 MG/1
25 TABLET ORAL DAILY
Qty: 30 TABLET | Refills: 3 | Status: SHIPPED | OUTPATIENT
Start: 2021-11-08 | End: 2022-09-23

## 2021-11-08 RX ORDER — SERTRALINE HYDROCHLORIDE 25 MG/1
25 TABLET, FILM COATED ORAL DAILY
COMMUNITY

## 2021-11-08 NOTE — ASSESSMENT & PLAN NOTE
Hypertension is worsening.  Dietary sodium restriction.  Weight loss.  Regular aerobic exercise.  Medication changes per orders.  Blood pressure will be reassessed in 4 weeks.  Repeat /102. Will add losartan

## 2021-11-08 NOTE — PROGRESS NOTES
"Chief Complaint  Annual Exam and recent fall incident (ambulatory disturbances )  Subjective        Marjorie FLORENTINO presents to [facility]  Pt comes in today for routine physical and biometric screening.   Also with c/o leg weakness. Went to ER on Saturday with c/o difficulty walking and felt like gait was altered. Had a fall on 10/22 when she tripped over a chair and fell on the concrete floor at work. States she has had some wrist pain since. The weakness has been present since the fall. Went to the ER on Saturday as she was worried. They did CT head and neck with no acute findings. She used to work with Dr. Rose and has an appt with him on Thursday for some xrays of lumbar spine and other testing.   HTN: BP elevated today. Doesn't check BP often at home. Denies any CP, SOA, palpitations, dizziness, or HA's.        Objective     Vital Signs:   /85 (BP Location: Left arm, Patient Position: Sitting, Cuff Size: Adult)   Pulse 89   Temp 97.8 °F (36.6 °C) (Skin)   Ht 177.8 cm (70\")   Wt 97.1 kg (214 lb)   SpO2 99%   BMI 30.71 kg/m²       BP Readings from Last 3 Encounters:   11/08/21 145/85   11/06/21 138/78   08/27/20 128/90       Wt Readings from Last 3 Encounters:   11/08/21 97.1 kg (214 lb)   11/06/21 99.4 kg (219 lb 2.2 oz)   08/27/20 94.8 kg (209 lb)     Physical Exam  Constitutional:       Appearance: She is well-developed.   HENT:      Head: Normocephalic.   Eyes:      Conjunctiva/sclera: Conjunctivae normal.      Pupils: Pupils are equal, round, and reactive to light.   Neck:      Thyroid: No thyromegaly.   Cardiovascular:      Rate and Rhythm: Normal rate and regular rhythm.      Heart sounds: No murmur heard.      Pulmonary:      Effort: Pulmonary effort is normal.      Breath sounds: Normal breath sounds.   Abdominal:      General: Bowel sounds are normal.      Palpations: Abdomen is soft. There is no mass.      Tenderness: There is no abdominal tenderness. "   Musculoskeletal:      Cervical back: Neck supple.   Skin:     General: Skin is warm and dry.      Findings: No lesion.   Neurological:      Mental Status: She is alert and oriented to person, place, and time.      Cranial Nerves: No cranial nerve deficit.      Motor: No weakness.      Gait: Gait normal.      Deep Tendon Reflexes: Reflexes normal.   Psychiatric:         Behavior: Behavior normal.        Result Review :   The following data was reviewed by: EZIO Mcpherson on 11/08/2021:  Common labs    Common Labsle 11/6/21 11/6/21    1647 1647   Glucose  107 (A)   BUN  11   Creatinine  0.62   eGFR Non African Am  100   Sodium  138   Potassium  4.0   Chloride  100   Calcium  9.0   Albumin  4.30   Total Bilirubin  0.3   Alkaline Phosphatase  72   AST (SGOT)  18   ALT (SGPT)  9   WBC 9.30    Hemoglobin 13.0    Hematocrit 37.2    Platelets 340    (A) Abnormal value            Data reviewed: Radiologic studies CT scans and Recent hospitalization notes ER visit on 11/6/21          Assessment and Plan    Diagnoses and all orders for this visit:    1. Preventative health care (Primary)  -     Comprehensive Metabolic Panel; Future  -     Hemoglobin A1c; Future  -     Lipid Panel; Future  -     Vitamin D 25 Hydroxy; Future    2. Weakness of both lower extremities    3. Encounter for screening mammogram for malignant neoplasm of breast  -     Mammo Screening Digital Tomosynthesis Bilateral With CAD; Future    4. Primary hypertension  Assessment & Plan:  Hypertension is worsening.  Dietary sodium restriction.  Weight loss.  Regular aerobic exercise.  Medication changes per orders.  Blood pressure will be reassessed in 4 weeks.  Repeat /102. Will add losartan     Orders:  -     losartan (Cozaar) 25 MG tablet; Take 1 tablet by mouth Daily.  Dispense: 30 tablet; Refill: 3  check labs  Pt is set up to see Dr. Rose on Thursday for some xrays. We discussed referral to neuro pending those results.   Add  losartan  During this visit for their annual exam, we reviewed their personal history, social history and family history. We went over their medications and all the recommended health maintenance items for their age group. They were given the opportunity to ask questions and discuss other concerns.           Follow Up   Return in about 4 weeks (around 12/6/2021) for Annual physical.  Patient was given instructions and counseling regarding her condition or for health maintenance advice. Please see specific information pulled into the AVS if appropriate.

## 2021-11-10 RX ORDER — ROSUVASTATIN CALCIUM 5 MG/1
5 TABLET, COATED ORAL DAILY
Qty: 90 TABLET | Refills: 1 | Status: SHIPPED | OUTPATIENT
Start: 2021-11-10 | End: 2021-12-07

## 2021-12-03 RX ORDER — HYDROCHLOROTHIAZIDE 25 MG/1
TABLET ORAL
Qty: 90 TABLET | Refills: 1 | Status: SHIPPED | OUTPATIENT
Start: 2021-12-03

## 2021-12-07 ENCOUNTER — OFFICE VISIT (OUTPATIENT)
Dept: FAMILY MEDICINE CLINIC | Facility: CLINIC | Age: 56
End: 2021-12-07

## 2021-12-07 VITALS
OXYGEN SATURATION: 98 % | WEIGHT: 212 LBS | TEMPERATURE: 97.3 F | HEIGHT: 70 IN | DIASTOLIC BLOOD PRESSURE: 80 MMHG | SYSTOLIC BLOOD PRESSURE: 118 MMHG | HEART RATE: 85 BPM | BODY MASS INDEX: 30.35 KG/M2

## 2021-12-07 DIAGNOSIS — I10 PRIMARY HYPERTENSION: Primary | ICD-10-CM

## 2021-12-07 PROCEDURE — 99213 OFFICE O/P EST LOW 20 MIN: CPT | Performed by: NURSE PRACTITIONER

## 2021-12-07 RX ORDER — MULTIVIT-MIN/IRON/FOLIC ACID/K 18-600-40
2000 CAPSULE ORAL DAILY
COMMUNITY

## 2021-12-07 NOTE — PROGRESS NOTES
"Chief Complaint  4 week follow up (HTN )  Subjective        Marjorie FLORENTINO presents to [facility]  Pt presents today for a 4 week f/u on HTN.  Losartan was added at pt's last visit.  She is currently taking HCTZ and losartan daily.  She does not monitor her BP at home.  She reports that she is tolerating the medication well and denies any side effects.  Denies CP, SOA, HA's, blurred vision.        Objective     Vital Signs:   /80 (BP Location: Left arm, Patient Position: Sitting, Cuff Size: Adult)   Pulse 85   Temp 97.3 °F (36.3 °C) (Skin)   Ht 177.8 cm (70\")   Wt 96.2 kg (212 lb)   SpO2 98%   BMI 30.42 kg/m²       BP Readings from Last 3 Encounters:   12/07/21 118/80   11/08/21 145/85   11/06/21 138/78       Wt Readings from Last 3 Encounters:   12/07/21 96.2 kg (212 lb)   11/08/21 97.1 kg (214 lb)   11/06/21 99.4 kg (219 lb 2.2 oz)     Physical Exam  Constitutional:       Appearance: She is obese. She is not ill-appearing.   Cardiovascular:      Rate and Rhythm: Normal rate and regular rhythm.      Pulses: Normal pulses.      Heart sounds: Normal heart sounds.   Pulmonary:      Effort: Pulmonary effort is normal.      Breath sounds: Normal breath sounds.   Neurological:      Mental Status: She is alert and oriented to person, place, and time.   Psychiatric:         Mood and Affect: Mood normal.         Behavior: Behavior normal.        Result Review :                 Assessment and Plan    Diagnoses and all orders for this visit:    1. Primary hypertension (Primary)  Assessment & Plan:  Hypertension is improving with treatment.  Continue current treatment regimen.  Blood pressure will be reassessed at the next regular appointment.    During this office visit, we discussed the pertinent aspects of the visit and treatment recommendations. Pt verbalizes understanding. Follow up was discussed. Patient was given the opportunity to ask questions and discuss other concerns. "         Follow Up   No follow-ups on file.  Patient was given instructions and counseling regarding her condition or for health maintenance advice. Please see specific information pulled into the AVS if appropriate.

## 2021-12-16 ENCOUNTER — LAB (OUTPATIENT)
Dept: FAMILY MEDICINE CLINIC | Facility: CLINIC | Age: 56
End: 2021-12-16

## 2021-12-16 ENCOUNTER — TELEPHONE (OUTPATIENT)
Dept: FAMILY MEDICINE CLINIC | Facility: CLINIC | Age: 56
End: 2021-12-16

## 2021-12-16 DIAGNOSIS — R29.898 WEAKNESS OF BOTH LOWER EXTREMITIES: Primary | ICD-10-CM

## 2021-12-16 PROCEDURE — 84439 ASSAY OF FREE THYROXINE: CPT | Performed by: EMERGENCY MEDICINE

## 2021-12-16 PROCEDURE — 85025 COMPLETE CBC W/AUTO DIFF WBC: CPT | Performed by: EMERGENCY MEDICINE

## 2021-12-16 PROCEDURE — 84443 ASSAY THYROID STIM HORMONE: CPT | Performed by: EMERGENCY MEDICINE

## 2021-12-16 PROCEDURE — 36415 COLL VENOUS BLD VENIPUNCTURE: CPT

## 2021-12-16 PROCEDURE — 80053 COMPREHEN METABOLIC PANEL: CPT | Performed by: EMERGENCY MEDICINE

## 2021-12-16 PROCEDURE — 83735 ASSAY OF MAGNESIUM: CPT | Performed by: EMERGENCY MEDICINE

## 2021-12-16 PROCEDURE — 82746 ASSAY OF FOLIC ACID SERUM: CPT | Performed by: EMERGENCY MEDICINE

## 2021-12-16 PROCEDURE — 82728 ASSAY OF FERRITIN: CPT | Performed by: EMERGENCY MEDICINE

## 2021-12-16 PROCEDURE — 82607 VITAMIN B-12: CPT | Performed by: EMERGENCY MEDICINE

## 2021-12-16 PROCEDURE — 84481 FREE ASSAY (FT-3): CPT | Performed by: EMERGENCY MEDICINE

## 2021-12-16 PROCEDURE — 86140 C-REACTIVE PROTEIN: CPT | Performed by: EMERGENCY MEDICINE

## 2021-12-16 PROCEDURE — 83002 ASSAY OF GONADOTROPIN (LH): CPT | Performed by: EMERGENCY MEDICINE

## 2021-12-16 PROCEDURE — 82550 ASSAY OF CK (CPK): CPT | Performed by: EMERGENCY MEDICINE

## 2021-12-16 PROCEDURE — 82306 VITAMIN D 25 HYDROXY: CPT | Performed by: EMERGENCY MEDICINE

## 2021-12-16 PROCEDURE — 83001 ASSAY OF GONADOTROPIN (FSH): CPT | Performed by: EMERGENCY MEDICINE

## 2021-12-16 PROCEDURE — 82672 ASSAY OF ESTROGEN: CPT | Performed by: EMERGENCY MEDICINE

## 2021-12-16 NOTE — TELEPHONE ENCOUNTER
Patient states she had an annual exam with [facility] in November and brought a  form in for her insurance.  Patient wasn't able to take the form same day because we needed lab results included on form.     Patient came in today and asked for copy and the form was unable to be located.     Any information on this form?

## 2021-12-17 ENCOUNTER — TELEPHONE (OUTPATIENT)
Dept: FAMILY MEDICINE CLINIC | Facility: CLINIC | Age: 56
End: 2021-12-17

## 2021-12-17 LAB
25(OH)D3 SERPL-MCNC: 50.2 NG/ML (ref 30–100)
ALBUMIN SERPL-MCNC: 4.5 G/DL (ref 3.5–5.2)
ALBUMIN/GLOB SERPL: 1.3 G/DL
ALP SERPL-CCNC: 72 U/L (ref 39–117)
ALT SERPL W P-5'-P-CCNC: 6 U/L (ref 1–33)
ANION GAP SERPL CALCULATED.3IONS-SCNC: 12.9 MMOL/L (ref 5–15)
AST SERPL-CCNC: 18 U/L (ref 1–32)
BASOPHILS # BLD AUTO: 0.04 10*3/MM3 (ref 0–0.2)
BASOPHILS NFR BLD AUTO: 0.3 % (ref 0–1.5)
BILIRUB SERPL-MCNC: 0.3 MG/DL (ref 0–1.2)
BUN SERPL-MCNC: 15 MG/DL (ref 6–20)
BUN/CREAT SERPL: 22.4 (ref 7–25)
CALCIUM SPEC-SCNC: 10.1 MG/DL (ref 8.6–10.5)
CHLORIDE SERPL-SCNC: 99 MMOL/L (ref 98–107)
CK SERPL-CCNC: 36 U/L (ref 20–180)
CO2 SERPL-SCNC: 28.1 MMOL/L (ref 22–29)
CREAT SERPL-MCNC: 0.67 MG/DL (ref 0.57–1)
CRP SERPL-MCNC: 0.35 MG/DL (ref 0–0.5)
DEPRECATED RDW RBC AUTO: 42.2 FL (ref 37–54)
EOSINOPHIL # BLD AUTO: 0.08 10*3/MM3 (ref 0–0.4)
EOSINOPHIL NFR BLD AUTO: 0.7 % (ref 0.3–6.2)
ERYTHROCYTE [DISTWIDTH] IN BLOOD BY AUTOMATED COUNT: 12.1 % (ref 12.3–15.4)
FERRITIN SERPL-MCNC: 155 NG/ML (ref 13–150)
FOLATE SERPL-MCNC: 13 NG/ML (ref 4.78–24.2)
FSH SERPL-ACNC: 83.3 MIU/ML
GFR SERPL CREATININE-BSD FRML MDRD: 91 ML/MIN/1.73
GLOBULIN UR ELPH-MCNC: 3.5 GM/DL
GLUCOSE SERPL-MCNC: 97 MG/DL (ref 65–99)
HCT VFR BLD AUTO: 40.9 % (ref 34–46.6)
HGB BLD-MCNC: 13.6 G/DL (ref 12–15.9)
IMM GRANULOCYTES # BLD AUTO: 0.04 10*3/MM3 (ref 0–0.05)
IMM GRANULOCYTES NFR BLD AUTO: 0.3 % (ref 0–0.5)
LH SERPL-ACNC: 64.6 MIU/ML
LYMPHOCYTES # BLD AUTO: 1.34 10*3/MM3 (ref 0.7–3.1)
LYMPHOCYTES NFR BLD AUTO: 11.5 % (ref 19.6–45.3)
MAGNESIUM SERPL-MCNC: 1.8 MG/DL (ref 1.6–2.6)
MCH RBC QN AUTO: 31.7 PG (ref 26.6–33)
MCHC RBC AUTO-ENTMCNC: 33.3 G/DL (ref 31.5–35.7)
MCV RBC AUTO: 95.3 FL (ref 79–97)
MONOCYTES # BLD AUTO: 0.55 10*3/MM3 (ref 0.1–0.9)
MONOCYTES NFR BLD AUTO: 4.7 % (ref 5–12)
NEUTROPHILS NFR BLD AUTO: 82.5 % (ref 42.7–76)
NEUTROPHILS NFR BLD AUTO: 9.58 10*3/MM3 (ref 1.7–7)
NRBC BLD AUTO-RTO: 0 /100 WBC (ref 0–0.2)
PLATELET # BLD AUTO: 382 10*3/MM3 (ref 140–450)
PMV BLD AUTO: 10.1 FL (ref 6–12)
POTASSIUM SERPL-SCNC: 4 MMOL/L (ref 3.5–5.2)
PROT SERPL-MCNC: 8 G/DL (ref 6–8.5)
RBC # BLD AUTO: 4.29 10*6/MM3 (ref 3.77–5.28)
SODIUM SERPL-SCNC: 140 MMOL/L (ref 136–145)
T3FREE SERPL-MCNC: 3.79 PG/ML (ref 2–4.4)
T4 FREE SERPL-MCNC: 1.4 NG/DL (ref 0.93–1.7)
TSH SERPL DL<=0.05 MIU/L-ACNC: 1.19 UIU/ML (ref 0.27–4.2)
VIT B12 BLD-MCNC: 523 PG/ML (ref 211–946)
WBC NRBC COR # BLD: 11.63 10*3/MM3 (ref 3.4–10.8)

## 2021-12-17 NOTE — TELEPHONE ENCOUNTER
Caller: Marjorie FLORENTINO    Relationship: Self    Best call back number: [phone]    What test was performed: LABS    When was the test performed:12/16/2021    Where was the test performed: [facility]

## 2021-12-18 LAB — ESTROGEN SERPL-MCNC: 120 PG/ML

## 2021-12-20 NOTE — TELEPHONE ENCOUNTER
Gave message to patient at 1:29pm.  She will try to go to [employer] and get it printed out.  Said Alma LOPEZ printed off the form when patient was in the office the last time.

## 2021-12-20 NOTE — TELEPHONE ENCOUNTER
Doesn't look like it, but not sure where the labs came from. I didn't order those at last appt.  Also regarding her [employer] form, it would've been in my outbox and mailed to her. I don't see it anywhere.

## 2022-01-01 ENCOUNTER — INPATIENT HOSPITAL (OUTPATIENT)
Dept: URBAN - METROPOLITAN AREA HOSPITAL 84 | Facility: HOSPITAL | Age: 57
End: 2022-01-01

## 2022-01-01 DIAGNOSIS — R63.39 OTHER FEEDING DIFFICULTIES: ICD-10-CM

## 2022-01-01 DIAGNOSIS — K29.80 DUODENITIS WITHOUT BLEEDING: ICD-10-CM

## 2022-01-01 DIAGNOSIS — R63.30 FEEDING DIFFICULTIES, UNSPECIFIED: ICD-10-CM

## 2022-01-01 DIAGNOSIS — R13.10 DYSPHAGIA, UNSPECIFIED: ICD-10-CM

## 2022-01-01 DIAGNOSIS — K44.9 DIAPHRAGMATIC HERNIA WITHOUT OBSTRUCTION OR GANGRENE: ICD-10-CM

## 2022-01-01 DIAGNOSIS — K22.2 ESOPHAGEAL OBSTRUCTION: ICD-10-CM

## 2022-01-01 DIAGNOSIS — K29.50 UNSPECIFIED CHRONIC GASTRITIS WITHOUT BLEEDING: ICD-10-CM

## 2022-01-01 PROCEDURE — 43239 EGD BIOPSY SINGLE/MULTIPLE: CPT | Performed by: INTERNAL MEDICINE

## 2022-01-01 PROCEDURE — 43246 EGD PLACE GASTROSTOMY TUBE: CPT | Performed by: INTERNAL MEDICINE

## 2022-01-01 PROCEDURE — 99252 IP/OBS CONSLTJ NEW/EST SF 35: CPT | Performed by: NURSE PRACTITIONER

## 2022-01-03 ENCOUNTER — PATIENT MESSAGE (OUTPATIENT)
Dept: FAMILY MEDICINE CLINIC | Facility: CLINIC | Age: 57
End: 2022-01-03

## 2022-01-03 ENCOUNTER — TELEPHONE (OUTPATIENT)
Dept: NEUROSURGERY | Facility: CLINIC | Age: 57
End: 2022-01-03

## 2022-01-03 DIAGNOSIS — R29.898 WEAKNESS OF BOTH LOWER EXTREMITIES: Primary | ICD-10-CM

## 2022-01-03 NOTE — TELEPHONE ENCOUNTER
From: Marjorie FLORENTINO  To: Lurdes West, EZIO  Sent: 1/3/2022 12:46 PM EST  Subject: Referral    Can you guys give me a referral to dr preston? This goes back to the fall I had 10/22. I have mris already done and basically need a consult with them bc I am having major issues with walking and using my hands. My number is [phone]. Thank you.

## 2022-01-03 NOTE — TELEPHONE ENCOUNTER
Caller: Marjorie FLORENTINO  Relationship: Self  Best call back number: [phone]  What was the call regarding: PATIENT CALLED TO SCHEDULE AN APPOINTMENT, AS SHE HAS NOT BEEN SEEN PREVIOUSLY BY OUR OFFICE I LET HER KNOW THAT WE WOULD REQUIRE A REFERRAL BUT THAT THIS CAN COME FROM HER PCP. PATIENT IS GOING TO CONTACT SOMEONE TO START A REFERRAL.    PATIENT STATED SHE HAD MRI'S PREFORMED AT [facility].

## 2022-01-11 ENCOUNTER — HOSPITAL ENCOUNTER (INPATIENT)
Facility: HOSPITAL | Age: 57
LOS: 8 days | Discharge: NURSING, REHAB OR LONG-TERM CARE | End: 2022-01-21
Attending: EMERGENCY MEDICINE | Admitting: INTERNAL MEDICINE

## 2022-01-11 ENCOUNTER — TELEPHONE (OUTPATIENT)
Dept: FAMILY MEDICINE CLINIC | Facility: CLINIC | Age: 57
End: 2022-01-11

## 2022-01-11 ENCOUNTER — PATIENT MESSAGE (OUTPATIENT)
Dept: FAMILY MEDICINE CLINIC | Facility: CLINIC | Age: 57
End: 2022-01-11

## 2022-01-11 DIAGNOSIS — M54.50 ACUTE BILATERAL LOW BACK PAIN, UNSPECIFIED WHETHER SCIATICA PRESENT: Primary | ICD-10-CM

## 2022-01-11 DIAGNOSIS — G95.9 CERVICAL MYELOPATHY: ICD-10-CM

## 2022-01-11 DIAGNOSIS — M50.00 INTERVERTEBRAL CERVICAL DISC DISORDER WITH MYELOPATHY, CERVICAL REGION: ICD-10-CM

## 2022-01-11 PROBLEM — M54.9 BACK PAIN: Status: ACTIVE | Noted: 2022-01-11

## 2022-01-11 LAB
BILIRUB UR QL STRIP: NEGATIVE
CLARITY UR: CLEAR
COLOR UR: YELLOW
GLUCOSE UR STRIP-MCNC: NEGATIVE MG/DL
HGB UR QL STRIP.AUTO: NEGATIVE
KETONES UR QL STRIP: ABNORMAL
LEUKOCYTE ESTERASE UR QL STRIP.AUTO: NEGATIVE
NITRITE UR QL STRIP: NEGATIVE
PH UR STRIP.AUTO: 7.5 [PH] (ref 5–8)
PROT UR QL STRIP: NEGATIVE
SARS-COV-2 RNA PNL SPEC NAA+PROBE: NOT DETECTED
SP GR UR STRIP: 1.01 (ref 1–1.03)
UROBILINOGEN UR QL STRIP: ABNORMAL

## 2022-01-11 PROCEDURE — G0378 HOSPITAL OBSERVATION PER HR: HCPCS

## 2022-01-11 PROCEDURE — 81003 URINALYSIS AUTO W/O SCOPE: CPT | Performed by: NURSE PRACTITIONER

## 2022-01-11 PROCEDURE — 99284 EMERGENCY DEPT VISIT MOD MDM: CPT

## 2022-01-11 PROCEDURE — 25010000002 ORPHENADRINE CITRATE PER 60 MG: Performed by: NURSE PRACTITIONER

## 2022-01-11 PROCEDURE — 87635 SARS-COV-2 COVID-19 AMP PRB: CPT | Performed by: NURSE PRACTITIONER

## 2022-01-11 PROCEDURE — 0 MORPHINE SULFATE 4 MG/ML SOLUTION: Performed by: NURSE PRACTITIONER

## 2022-01-11 RX ORDER — ORPHENADRINE CITRATE 30 MG/ML
60 INJECTION INTRAMUSCULAR; INTRAVENOUS ONCE
Status: COMPLETED | OUTPATIENT
Start: 2022-01-11 | End: 2022-01-11

## 2022-01-11 RX ORDER — SODIUM CHLORIDE 0.9 % (FLUSH) 0.9 %
10 SYRINGE (ML) INJECTION AS NEEDED
Status: DISCONTINUED | OUTPATIENT
Start: 2022-01-11 | End: 2022-01-21 | Stop reason: HOSPADM

## 2022-01-11 RX ORDER — SODIUM CHLORIDE 0.9 % (FLUSH) 0.9 %
10 SYRINGE (ML) INJECTION EVERY 12 HOURS SCHEDULED
Status: DISCONTINUED | OUTPATIENT
Start: 2022-01-11 | End: 2022-01-21 | Stop reason: HOSPADM

## 2022-01-11 RX ORDER — BACLOFEN 10 MG/1
10 TABLET ORAL 3 TIMES DAILY
Qty: 30 TABLET | Refills: 1 | Status: ON HOLD | OUTPATIENT
Start: 2022-01-11 | End: 2022-01-13

## 2022-01-11 RX ORDER — ACETAMINOPHEN 325 MG/1
650 TABLET ORAL EVERY 4 HOURS PRN
Status: DISCONTINUED | OUTPATIENT
Start: 2022-01-11 | End: 2022-01-14

## 2022-01-11 RX ORDER — MORPHINE SULFATE 4 MG/ML
4 INJECTION, SOLUTION INTRAMUSCULAR; INTRAVENOUS ONCE
Status: COMPLETED | OUTPATIENT
Start: 2022-01-11 | End: 2022-01-11

## 2022-01-11 RX ORDER — CHOLECALCIFEROL (VITAMIN D3) 125 MCG
5 CAPSULE ORAL NIGHTLY PRN
Status: DISCONTINUED | OUTPATIENT
Start: 2022-01-11 | End: 2022-01-14

## 2022-01-11 RX ORDER — ONDANSETRON 2 MG/ML
4 INJECTION INTRAMUSCULAR; INTRAVENOUS EVERY 6 HOURS PRN
Status: DISCONTINUED | OUTPATIENT
Start: 2022-01-11 | End: 2022-01-21 | Stop reason: HOSPADM

## 2022-01-11 RX ADMIN — MORPHINE SULFATE 4 MG: 4 INJECTION INTRAVENOUS at 18:31

## 2022-01-11 RX ADMIN — ORPHENADRINE CITRATE 60 MG: 30 INJECTION INTRAMUSCULAR; INTRAVENOUS at 16:42

## 2022-01-11 RX ADMIN — SODIUM CHLORIDE, PRESERVATIVE FREE 10 ML: 5 INJECTION INTRAVENOUS at 20:49

## 2022-01-11 NOTE — TELEPHONE ENCOUNTER
From: Marjorie FLORENTINO  To: Lurdes West, EZIO  Sent: 1/11/2022 8:27 AM EST  Subject: Request     My back is seizing up and I can barely walk. Can I get some kind of muscle relaxer please? This in combination with the other issues I’m having puts me to the point where I am almost unable to take care of myself. I don’t think I was ever able to convey to dr argueta phone person how miserable I’ve been for the last few weeks but this is absolute hell for me. Thanks so much for your help.

## 2022-01-11 NOTE — ED NOTES
Pt reports she fell Oct. Of last year and has lower back pain since the fall and has worsened recently. Pt reports since the fall she has had bladder incontinence, abnormal gait, and neck pain. Pt reports when she has pain anywhere it is primarily in her neck.      Klaudia Kaur, RN  01/11/22 3684

## 2022-01-11 NOTE — ED NOTES
"Patient has neem asked multiple times for a urine sample. Pt reports she is unable to stand to go to the bedside. Pt was told we can do a cath. Pt refused cath and reports if the medication works I will be able to stand. Pt states, \"it is not a UTI any ways.\"      Klaudia Kaur RN  01/11/22 3063    "

## 2022-01-11 NOTE — ED NOTES
Pt was unable to ambulate. Pt sat up on side of bed and reported she could not stand. Pt refused to try to stand with a two-assist.     Klaudia Kaur RN  01/11/22 0155

## 2022-01-11 NOTE — ED PROVIDER NOTES
"Subjective    Chief Complaint   Patient presents with   • Back Pain     Lurdes West, APRN  No LMP recorded. Patient is perimenopausal.  Allergies   Allergen Reactions   • Ibuprofen Angioedema   • Lisinopril Hives     History Provided By: Patient    Patient is a 56-year-old female presents emergency department with complaint of back pain, and \"back seizing\".  Patient reports this became worse today.  She reports she had a fall in October, and has had multiple symptoms since that time, with continued back pain.  Patient reports she has had weakness in her hands, she urinates when she stands up, muscle spasms, and generally feeling weak.  Patient has been seen by her primary care provider, has had MRI of the brain, cervical spine and lumbar spine since her symptoms began.  She reports her symptoms have not changed, but her \"back seizing\" is what prompted her visit to the ED today.  She denies any other bowel or bladder symptoms.  No extremity weakness.          Review of Systems   Constitutional: Negative for chills and fever.   Respiratory: Negative for shortness of breath.    Cardiovascular: Negative for chest pain.   Gastrointestinal: Negative for constipation.   Genitourinary: Negative for decreased urine volume.   Musculoskeletal: Positive for back pain. Negative for neck pain.   Skin: Negative for rash.   Neurological: Positive for weakness (Generalized due to pain) and numbness (Ongoing since fall in October).       Past Medical History:   Diagnosis Date   • Hypertension        Allergies   Allergen Reactions   • Ibuprofen Angioedema   • Lisinopril Hives       Past Surgical History:   Procedure Laterality Date   •  SECTION      x3        Family History   Problem Relation Age of Onset   • COPD Mother    • Coronary artery disease Mother    • Stroke Father    • Hypertension Father    • Kidney disease Father    • Hypertension Sister    • Stroke Maternal Grandmother    • Stroke Paternal Grandmother  "       Social History     Socioeconomic History   • Marital status:    • Number of children: 3   Tobacco Use   • Smoking status: Never Smoker   • Smokeless tobacco: Never Used   Substance and Sexual Activity   • Alcohol use: Never   • Drug use: Defer   • Sexual activity: Yes     Partners: Male     Birth control/protection: None           Objective   Physical Exam  Vitals and nursing note reviewed.   Constitutional:       General: She is not in acute distress.     Appearance: Normal appearance. She is not ill-appearing, toxic-appearing or diaphoretic.   HENT:      Head: Normocephalic and atraumatic.      Nose: Nose normal.      Mouth/Throat:      Mouth: Mucous membranes are moist.      Pharynx: Oropharynx is clear.   Eyes:      Extraocular Movements: Extraocular movements intact.      Conjunctiva/sclera: Conjunctivae normal.      Pupils: Pupils are equal, round, and reactive to light.   Cardiovascular:      Rate and Rhythm: Normal rate and regular rhythm.      Heart sounds: Normal heart sounds. No murmur heard.  No friction rub. No gallop.    Pulmonary:      Breath sounds: Normal breath sounds.   Abdominal:      General: Bowel sounds are normal.      Palpations: Abdomen is soft.   Musculoskeletal:         General: Normal range of motion.      Cervical back: Normal range of motion and neck supple.      Right lower leg: No edema.      Left lower leg: No edema.      Comments: No vertebral point tenderness noted to the C-spine T-spine or L-spine.  No palpable step-offs.  No soft tissue tenderness is noted.  No skin abnormalities are appreciated.  No saddle anesthesia    Skin:     General: Skin is warm and dry.      Capillary Refill: Capillary refill takes less than 2 seconds.      Findings: No erythema or rash.   Neurological:      General: No focal deficit present.      Mental Status: She is alert and oriented to person, place, and time.      GCS: GCS eye subscore is 4. GCS verbal subscore is 5. GCS motor  "subscore is 6.      Sensory: Sensation is intact.      Motor: No weakness.      Deep Tendon Reflexes:      Reflex Scores:       Patellar reflexes are 2+ on the right side and 2+ on the left side.     Comments: Bilateral lower extremity strength is 5 out of 5 and equal.  Sensation is intact to light touch.  Bilateral pedal pulses intact.         Procedures           ED Course  ED Course as of 01/12/22 0035   Tue Jan 11, 2022   1644 Pt refuses urine.  [LB]      ED Course User Index  [LB] Vanessa Mercado, APRN           /74 (BP Location: Right arm, Patient Position: Lying)   Pulse 83   Temp 98.3 °F (36.8 °C) (Oral)   Resp 18   Ht 177.8 cm (70\")   Wt 95.3 kg (210 lb)   SpO2 95%   Breastfeeding No   BMI 30.13 kg/m²   Labs Reviewed   URINALYSIS W/ CULTURE IF INDICATED - Abnormal; Notable for the following components:       Result Value    Ketones, UA 15 mg/dL (1+) (*)     All other components within normal limits    Narrative:     Urine microscopic not indicated.   COVID-19,CEPHEID/ZEYAD,COR/VIOLETA/PAD/ELAYNE IN-HOUSE,NP SWAB IN TRANSPORT MEDIA 3-4 HR TAT, RT-PCR - Normal    Narrative:     Fact sheet for providers: https://www.fda.gov/media/725413/download     Fact sheet for patients: https://www.fda.gov/media/123143/download  Fact sheet for providers: https://www.fda.gov/media/646135/download    Fact sheet for patients: https://www.fda.gov/media/662838/download    Test performed by PCR.   COVID PRE-OP / PRE-PROCEDURE SCREENING ORDER (NO ISOLATION)    Narrative:     The following orders were created for panel order COVID PRE-OP / PRE-PROCEDURE SCREENING ORDER (NO ISOLATION) - Swab, Nasopharynx.  Procedure                               Abnormality         Status                     ---------                               -----------         ------                     COVID-19,CEPHEID/ZEYAD,CO...[[id number]]  Normal              Final result                 Please view results for these tests on the individual orders. " "  BASIC METABOLIC PANEL   CBC WITH AUTO DIFFERENTIAL     Medications   sodium chloride 0.9 % flush 10 mL (10 mL Intravenous Given 1/11/22 2049)   sodium chloride 0.9 % flush 10 mL (has no administration in time range)   acetaminophen (TYLENOL) tablet 650 mg (has no administration in time range)   ondansetron (ZOFRAN) injection 4 mg (has no administration in time range)   melatonin tablet 5 mg (has no administration in time range)   orphenadrine (NORFLEX) injection 60 mg (60 mg Intravenous Given 1/11/22 1642)   Morphine sulfate (PF) injection 4 mg (4 mg Intravenous Given 1/11/22 1831)     No radiology results for the last day                                        MDM  Number of Diagnoses or Management Options  Acute bilateral low back pain, unspecified whether sciatica present  Diagnosis management comments: Patient is a 56-year-old female presents emergency department with complaint of low back pain, stating this is \"my back seizing up\".  Patient has had back pain since October when she had an injury at work.  She has been seen by her primary care provider, has had MRIs in November 2021.  She is awaiting a referral to neurosurgery.  I reviewed the patient's MRIs, given the patient's symptoms have not changed, I do not feel further imaging is warranted at this time.  She has a benign neurological exam here in the ED.  Patient continues to have pain and discomfort is not able to get up out of the bed unassisted.  She will be admitted to the ED observation unit for evaluation of physical therapy, neurosurgery.    I discussed with the patient their test results, work-up here in the emergency department, and need for admission and further evaluation. Patient is agreeable to the plan of care. At time of disposition patients VS are non concerning, and without acute distress.  Opportunity was provided for questions at the bedside, all questions and concerns were addressed.       Amount and/or Complexity of Data " Reviewed  Clinical lab tests: reviewed  Review and summarize past medical records: yes (Patient had CT cervical spine 11/6/2021. MRI brain impression normal for patient's age. MRI cervical spine no acute osseous abnormality. Multilevel degenerative changes throughout the spine with multilevel canal stenosis most right C4-C5. Varying degrees of neuroforaminal narrowing described in findings.Patient had MRI of the lumbar spine without contrast on 11/15/2021.  Which is after her fall and after her describes symptoms today began.  No acute abnormalities of lumbar spine such as osseous fracture contusion.  Impression as follows.IMPRESSION:  1. There are no acute abnormalities lumbar spine such as osseous fracture contusion.  2. There is moderate levoconvex curvature of the lumbar spine with degenerative disc disease and moderate to severe facet degenerative change.  3. There is severe neural foraminal narrowing on the left the L4-L5 level there is moderate bilateral osseous neural foraminal narrowing L3-L4 left greater than right. There is mild left neural foraminal narrowing L2-L3.  4. There is mild central narrowing left greater than right L3-L4.  )        Final diagnoses:   Acute bilateral low back pain, unspecified whether sciatica present       ED Disposition  ED Disposition     ED Disposition Condition Comment    Decision to Admit            No follow-up provider specified.       Medication List      No changes were made to your prescriptions during this visit.          Vanessa Mercado, APRN  01/12/22 0035

## 2022-01-11 NOTE — TELEPHONE ENCOUNTER
"Caller: Marjorie FLORENTINO    Relationship: Self    Best call back number: [phone]    What medication are you requesting: MUSCLE RELAXER    What are your current symptoms: LOWER RIGHT BACK \"SEIZING\"    How long have you been experiencing symptoms: 1 DAY    If a prescription is needed, what is your preferred pharmacy and phone number: [facility] - [place], [place] - [address] [place] [address] - [phone] [facility] [phone] FX     Additional notes:            "

## 2022-01-12 ENCOUNTER — APPOINTMENT (OUTPATIENT)
Dept: MRI IMAGING | Facility: HOSPITAL | Age: 57
End: 2022-01-12

## 2022-01-12 LAB
ANION GAP SERPL CALCULATED.3IONS-SCNC: 7 MMOL/L (ref 5–15)
BASOPHILS # BLD AUTO: 0 10*3/MM3 (ref 0–0.2)
BASOPHILS NFR BLD AUTO: 0.6 % (ref 0–1.5)
BUN SERPL-MCNC: 17 MG/DL (ref 6–20)
BUN/CREAT SERPL: 20.5 (ref 7–25)
CALCIUM SPEC-SCNC: 9.5 MG/DL (ref 8.6–10.5)
CHLORIDE SERPL-SCNC: 104 MMOL/L (ref 98–107)
CO2 SERPL-SCNC: 30 MMOL/L (ref 22–29)
CREAT SERPL-MCNC: 0.83 MG/DL (ref 0.57–1)
DEPRECATED RDW RBC AUTO: 42.9 FL (ref 37–54)
EOSINOPHIL # BLD AUTO: 0.2 10*3/MM3 (ref 0–0.4)
EOSINOPHIL NFR BLD AUTO: 2.4 % (ref 0.3–6.2)
ERYTHROCYTE [DISTWIDTH] IN BLOOD BY AUTOMATED COUNT: 12.9 % (ref 12.3–15.4)
GFR SERPL CREATININE-BSD FRML MDRD: 71 ML/MIN/1.73
GLUCOSE SERPL-MCNC: 92 MG/DL (ref 65–99)
HCT VFR BLD AUTO: 34.8 % (ref 34–46.6)
HGB BLD-MCNC: 12.2 G/DL (ref 12–15.9)
LYMPHOCYTES # BLD AUTO: 2.1 10*3/MM3 (ref 0.7–3.1)
LYMPHOCYTES NFR BLD AUTO: 29.6 % (ref 19.6–45.3)
MCH RBC QN AUTO: 31.9 PG (ref 26.6–33)
MCHC RBC AUTO-ENTMCNC: 34.9 G/DL (ref 31.5–35.7)
MCV RBC AUTO: 91.3 FL (ref 79–97)
MONOCYTES # BLD AUTO: 0.6 10*3/MM3 (ref 0.1–0.9)
MONOCYTES NFR BLD AUTO: 8.8 % (ref 5–12)
NEUTROPHILS NFR BLD AUTO: 4.3 10*3/MM3 (ref 1.7–7)
NEUTROPHILS NFR BLD AUTO: 58.6 % (ref 42.7–76)
NRBC BLD AUTO-RTO: 0.1 /100 WBC (ref 0–0.2)
PLATELET # BLD AUTO: 342 10*3/MM3 (ref 140–450)
PMV BLD AUTO: 7.2 FL (ref 6–12)
POTASSIUM SERPL-SCNC: 4.5 MMOL/L (ref 3.5–5.2)
RBC # BLD AUTO: 3.81 10*6/MM3 (ref 3.77–5.28)
SODIUM SERPL-SCNC: 141 MMOL/L (ref 136–145)
WBC NRBC COR # BLD: 7.2 10*3/MM3 (ref 3.4–10.8)

## 2022-01-12 PROCEDURE — 85025 COMPLETE CBC W/AUTO DIFF WBC: CPT | Performed by: EMERGENCY MEDICINE

## 2022-01-12 PROCEDURE — 80048 BASIC METABOLIC PNL TOTAL CA: CPT | Performed by: EMERGENCY MEDICINE

## 2022-01-12 PROCEDURE — G0378 HOSPITAL OBSERVATION PER HR: HCPCS

## 2022-01-12 PROCEDURE — 99254 IP/OBS CNSLTJ NEW/EST MOD 60: CPT | Performed by: NURSE PRACTITIONER

## 2022-01-12 PROCEDURE — A9579 GAD-BASE MR CONTRAST NOS,1ML: HCPCS | Performed by: EMERGENCY MEDICINE

## 2022-01-12 PROCEDURE — 72156 MRI NECK SPINE W/O & W/DYE: CPT

## 2022-01-12 PROCEDURE — 25010000002 GADOTERIDOL PER 1 ML: Performed by: EMERGENCY MEDICINE

## 2022-01-12 RX ORDER — LOSARTAN POTASSIUM 25 MG/1
25 TABLET ORAL DAILY
Status: DISCONTINUED | OUTPATIENT
Start: 2022-01-12 | End: 2022-01-21 | Stop reason: HOSPADM

## 2022-01-12 RX ORDER — MORPHINE SULFATE 4 MG/ML
4 INJECTION, SOLUTION INTRAMUSCULAR; INTRAVENOUS EVERY 4 HOURS PRN
Status: DISCONTINUED | OUTPATIENT
Start: 2022-01-12 | End: 2022-01-13

## 2022-01-12 RX ORDER — LIDOCAINE 50 MG/G
1 PATCH TOPICAL
Status: DISCONTINUED | OUTPATIENT
Start: 2022-01-12 | End: 2022-01-14

## 2022-01-12 RX ORDER — SERTRALINE HYDROCHLORIDE 25 MG/1
25 TABLET, FILM COATED ORAL DAILY
Status: DISCONTINUED | OUTPATIENT
Start: 2022-01-12 | End: 2022-01-21 | Stop reason: HOSPADM

## 2022-01-12 RX ORDER — PREGABALIN 75 MG/1
75 CAPSULE ORAL EVERY 8 HOURS SCHEDULED
Status: DISCONTINUED | OUTPATIENT
Start: 2022-01-12 | End: 2022-01-13

## 2022-01-12 RX ORDER — HYDROCHLOROTHIAZIDE 25 MG/1
25 TABLET ORAL DAILY
Status: DISCONTINUED | OUTPATIENT
Start: 2022-01-12 | End: 2022-01-13

## 2022-01-12 RX ADMIN — PREGABALIN 75 MG: 75 CAPSULE ORAL at 21:23

## 2022-01-12 RX ADMIN — SERTRALINE 25 MG: 25 TABLET, FILM COATED ORAL at 10:44

## 2022-01-12 RX ADMIN — SODIUM CHLORIDE, PRESERVATIVE FREE 10 ML: 5 INJECTION INTRAVENOUS at 21:24

## 2022-01-12 RX ADMIN — LIDOCAINE 1 PATCH: 50 PATCH TOPICAL at 10:44

## 2022-01-12 RX ADMIN — HYDROCHLOROTHIAZIDE 25 MG: 25 TABLET ORAL at 10:44

## 2022-01-12 RX ADMIN — PREGABALIN 75 MG: 75 CAPSULE ORAL at 13:03

## 2022-01-12 RX ADMIN — SODIUM CHLORIDE, PRESERVATIVE FREE 10 ML: 5 INJECTION INTRAVENOUS at 08:43

## 2022-01-12 RX ADMIN — ACETAMINOPHEN 650 MG: 325 TABLET, FILM COATED ORAL at 02:13

## 2022-01-12 RX ADMIN — LOSARTAN POTASSIUM 25 MG: 25 TABLET, FILM COATED ORAL at 10:44

## 2022-01-12 RX ADMIN — GADOTERIDOL 20 ML: 279.3 INJECTION, SOLUTION INTRAVENOUS at 15:45

## 2022-01-12 RX ADMIN — ACETAMINOPHEN 650 MG: 325 TABLET, FILM COATED ORAL at 08:42

## 2022-01-12 NOTE — PLAN OF CARE
Problem: Adult Inpatient Plan of Care  Goal: Plan of Care Review  Outcome: Ongoing, Progressing  Flowsheets (Taken 1/12/2022 1627)  Progress: improving  Plan of Care Reviewed With: patient  Outcome Summary: Patient ambulated to the bathroom multiple times with a stand by assist. Up to [facility] as well. MRI done today. Pt was seen by Neurosurgery. Pt will be seen by PT tomorrow--they have not evalutaed pt today yet. A&Ox4. Managed pain with tylenol. Pt says she is feeling better. Pt got a walker to use at home. Up in [place]. Not seen any s/s of acute distress. Safety maintain, will ctm.  Goal: Patient-Specific Goal (Individualized)  Outcome: Ongoing, Progressing  Goal: Absence of Hospital-Acquired Illness or Injury  Outcome: Ongoing, Progressing  Intervention: Identify and Manage Fall Risk  Recent Flowsheet Documentation  Taken 1/12/2022 1600 by Alexandra Mancini RN  Safety Promotion/Fall Prevention: safety round/check completed  Taken 1/12/2022 1515 by Alexandra Mancini RN  Safety Promotion/Fall Prevention: patient off unit  Taken 1/12/2022 1506 by Alexandra Mancini RN  Safety Promotion/Fall Prevention: safety round/check completed  Taken 1/12/2022 1319 by Alexandra Mancini RN  Safety Promotion/Fall Prevention: safety round/check completed  Taken 1/12/2022 1118 by Alexandra Mancini RN  Safety Promotion/Fall Prevention: safety round/check completed  Taken 1/12/2022 0957 by Alexandra Mancini RN  Safety Promotion/Fall Prevention: safety round/check completed  Taken 1/12/2022 0904 by Alexandra Mancini RN  Safety Promotion/Fall Prevention: safety round/check completed  Taken 1/12/2022 0843 by Alexandra Mancini RN  Safety Promotion/Fall Prevention: safety round/check completed  Taken 1/12/2022 0710 by Alexandra Mancini RN  Safety Promotion/Fall Prevention:   safety round/check completed   room organization consistent  Intervention: Prevent Skin Injury  Recent Flowsheet Documentation  Taken 1/12/2022 1600 by  Alexandra Mancini RN  Body Position: (UP IN CHAIR) position changed independently  Taken 1/12/2022 1506 by Alexandra Mancini RN  Body Position: sitting up in bed  Taken 1/12/2022 1319 by Alexandra Mancini RN  Body Position:   position changed independently   supine  Taken 1/12/2022 1118 by Alexandra Manicni RN  Body Position:   position changed independently   supine  Taken 1/12/2022 0904 by Alexandra Mancini RN  Body Position: sitting up in bed  Taken 1/12/2022 0843 by Alexandra Mancini RN  Body Position:   turned   sitting up in bed  Taken 1/12/2022 0710 by Alexandra Mancini RN  Body Position: supine  Skin Protection: adhesive use limited  Goal: Optimal Comfort and Wellbeing  Outcome: Ongoing, Progressing  Intervention: Provide Person-Centered Care  Recent Flowsheet Documentation  Taken 1/12/2022 1600 by Alexandra Mancini RN  Trust Relationship/Rapport: care explained  Taken 1/12/2022 1506 by Alexandra Mancini RN  Trust Relationship/Rapport: care explained  Taken 1/12/2022 1118 by Alexandra Mancini RN  Trust Relationship/Rapport: care explained  Taken 1/12/2022 0843 by Alexandra Mancini RN  Trust Relationship/Rapport:   thoughts/feelings acknowledged   reassurance provided   questions encouraged   questions answered   empathic listening provided   emotional support provided   choices provided   care explained  Goal: Readiness for Transition of Care  Outcome: Ongoing, Progressing     Problem: Skin Injury Risk Increased  Goal: Skin Health and Integrity  Outcome: Ongoing, Progressing  Intervention: Optimize Skin Protection  Recent Flowsheet Documentation  Taken 1/12/2022 1319 by Alexandra Mancini RN  Head of Bed (HOB): HOB at 20-30 degrees  Taken 1/12/2022 1118 by Alexandra Mancini RN  Pressure Reduction Techniques: frequent weight shift encouraged  Taken 1/12/2022 0843 by Alexandra Mancini RN  Head of Bed (HOB):   HOB at 45 degrees   HOB at 60 degrees  Taken 1/12/2022 0710 by Live  Alexandra HELM RN  Pressure Reduction Techniques: frequent weight shift encouraged  Head of Bed (HOB): HOB at 20-30 degrees  Skin Protection: adhesive use limited     Problem: Pain Acute  Goal: Optimal Pain Control  Outcome: Ongoing, Progressing   Goal Outcome Evaluation:  Plan of Care Reviewed With: patient        Progress: improving  Outcome Summary: Patient ambulated to the bathroom multiple times with a stand by assist. Up to [facility] as well. MRI done today. Pt was seen by Neurosurgery. Pt will be seen by PT tomorrow--they have not evalutaed pt today yet. A&Ox4. Managed pain with tylenol. Pt says she is feeling better. Pt got a walker to use at home. [facility] Not seen any s/s of acute distress. Safety maintain, will ctm.

## 2022-01-12 NOTE — SIGNIFICANT NOTE
01/12/22 1215   Rehab Time/Intention   Session Not Performed   (waiting on neurosurgeon consult/recommendation)   Recommendation   PT - Next Appointment 01/13/22

## 2022-01-12 NOTE — H&P
"[facility] Observation Unit H&P    Patient Name: Marjorie FLORENTINO  : 1965  MRN: [id number]  Primary Care Physician: Lurdes West APRN  Date of admission: 2022     Patient Care Team:  Lurdes West APRN as PCP - General          Subjective   History Present Illness     Chief Complaint:   Chief Complaint   Patient presents with   • Back Pain       Obtained from ED provider HPI on 2022:  Patient is a 56-year-old female presents emergency department with complaint of back pain, and \"back seizing\".  Patient reports this became worse today.  She reports she had a fall in October, and has had multiple symptoms since that time, with continued back pain.  Patient reports she has had weakness in her hands, she urinates when she stands up, muscle spasms, and generally feeling weak.  Patient has been seen by her primary care provider, has had MRI of the brain, cervical spine and lumbar spine since her symptoms began.  She reports her symptoms have not changed, but her \"back seizing\" is what prompted her visit to the ED today.  She denies any other bowel or bladder symptoms.  No extremity weakness.     2022: Patient confirms the HPI noted above including several month history of worsening low back pain following a fall in 2021 which seems to have become significantly worse over the past several weeks.  She notes pain in the lower midportion and right side of her back described as a \"seizing pain\" made somewhat worse with movement but no other obvious provoking or palliative factors.  Some associated bladder incontinence as well as gait abnormality because her \"feet keep dropping\" is also noted.  She denies any other recent trauma or falls though patient does report some bilateral distal paresthesias in all extremities.  She denies any dyspnea, nausea or vomiting, cough or fever or peripheral edema.  She confirms compliance all of her outpatient medical therapies.      ROS   Review of Systems "   Constitutional: Negative.   HENT: Negative.    Eyes: Negative.    Cardiovascular: Negative.    Respiratory: Negative.    Endocrine: Negative.    Skin: Negative.    Musculoskeletal: Positive for back pain and falls.   Gastrointestinal: Negative.    Neurological: Positive for loss of balance and paresthesias.        Gait abnormalities   Psychiatric/Behavioral: Negative.          Personal History     Past Medical History:   Past Medical History:   Diagnosis Date   • Hypertension        Surgical History:      Past Surgical History:   Procedure Laterality Date   •  SECTION      x3            Family History: family history includes COPD in her mother; Coronary artery disease in her mother; Hypertension in her father and sister; Kidney disease in her father; Stroke in her father, maternal grandmother, and paternal grandmother. Otherwise pertinent FHx was reviewed and unremarkable.     Social History:  reports that she has never smoked. She has never used smokeless tobacco. Drug use questions deferred to the physician. She reports that she does not drink alcohol.      Medications:  Prior to Admission medications    Medication Sig Start Date End Date Taking? Authorizing Provider   baclofen (LIORESAL) 10 MG tablet Take 1 tablet by mouth 3 (Three) Times a Day. 22   Lurdes West APRN   Cholecalciferol (Vitamin D) 50 MCG ( UT) capsule Take  by mouth Daily.    ProviderMonica MD   hydroCHLOROthiazide (HYDRODIURIL) 25 MG tablet TAKE ONE TABLET BY MOUTH DAILY 12/3/21   Lurdes West APRN   losartan (Cozaar) 25 MG tablet Take 1 tablet by mouth Daily. 21   Lurdes West APRN   magnesium gluconate (MAGONATE) 500 MG tablet Take 250 mg by mouth Daily.    ProviderMonica MD   sertraline (ZOLOFT) 50 MG tablet Take 25 mg by mouth Daily.    Monica Zarate MD   Turmeric (QC TUMERIC COMPLEX PO) Take  by mouth Daily.    Monica Zarate MD       Allergies:    Allergies   Allergen  Reactions   • Ibuprofen Angioedema   • Lisinopril Hives       Objective   Objective     Vital Signs  Temp:  [97.9 °F (36.6 °C)-99.3 °F (37.4 °C)] 98.3 °F (36.8 °C)  Heart Rate:  [64-98] 97  Resp:  [18] 18  BP: (115-167)/(62-90) 120/74  SpO2:  [93 %-99 %] 97 %  on   ;   Device (Oxygen Therapy): room air  Body mass index is 30.13 kg/m².    Physical Exam  Physical Exam  Vitals reviewed.   Constitutional:       General: She is not in acute distress.     Appearance: Normal appearance. She is obese. She is not ill-appearing, toxic-appearing or diaphoretic.   HENT:      Head: Normocephalic and atraumatic.      Right Ear: External ear normal.      Nose: Nose normal.      Mouth/Throat:      Mouth: Mucous membranes are moist.   Eyes:      Extraocular Movements: Extraocular movements intact.   Cardiovascular:      Rate and Rhythm: Normal rate and regular rhythm.      Pulses: Normal pulses.      Heart sounds: Normal heart sounds.   Pulmonary:      Effort: Pulmonary effort is normal.      Breath sounds: Normal breath sounds.   Abdominal:      General: Bowel sounds are normal. There is no distension.      Palpations: Abdomen is soft.      Tenderness: There is no abdominal tenderness.   Musculoskeletal:         General: Normal range of motion.      Cervical back: Normal range of motion.      Right lower leg: No edema.      Left lower leg: No edema.   Skin:     General: Skin is warm and dry.      Capillary Refill: Capillary refill takes less than 2 seconds.   Neurological:      General: No focal deficit present.      Mental Status: She is alert and oriented to person, place, and time.      Motor: No weakness.      Comments: Patient appears to have equal strength bilaterally, gait assessment deferred until after neurosurgery evaluation   Psychiatric:         Mood and Affect: Mood normal.         Behavior: Behavior normal.         Thought Content: Thought content normal.         Judgment: Judgment normal.     Results Review:  I have  personally reviewed most recent lab results and radiology images and interpretations and agree with findings, most notably: BMP, CBC, COVID-19 test and previous CT and MRI.    Results from last 7 days   Lab Units 01/12/22  0410   WBC 10*3/mm3 7.20   HEMOGLOBIN g/dL 12.2   HEMATOCRIT % 34.8   PLATELETS 10*3/mm3 342     Results from last 7 days   Lab Units 01/12/22  0410   SODIUM mmol/L 141   POTASSIUM mmol/L 4.5   CHLORIDE mmol/L 104   CO2 mmol/L 30.0*   BUN mg/dL 17   CREATININE mg/dL 0.83   GLUCOSE mg/dL 92   CALCIUM mg/dL 9.5     Estimated Creatinine Clearance: 95.8 mL/min (by C-G formula based on SCr of 0.83 mg/dL).  Brief Urine Lab Results  (Last result in the past 365 days)      Color   Clarity   Blood   Leuk Est   Nitrite   Protein   CREAT   Urine HCG        01/11/22 1732 Yellow   Clear   Negative   Negative   Negative   Negative                 Microbiology Results (last 10 days)     Procedure Component Value - Date/Time    COVID PRE-OP / PRE-PROCEDURE SCREENING ORDER (NO ISOLATION) - Swab, Nasopharynx [[id number]]  (Normal) Collected: 01/11/22 1828    Lab Status: Final result Specimen: Swab from Nasopharynx Updated: 01/11/22 1857    Narrative:      The following orders were created for panel order COVID PRE-OP / PRE-PROCEDURE SCREENING ORDER (NO ISOLATION) - Swab, Nasopharynx.  Procedure                               Abnormality         Status                     ---------                               -----------         ------                     COVID-19,CEPHEID/ZEYAD,CO...[[id number]]  Normal              Final result                 Please view results for these tests on the individual orders.    COVID-19,CEPHEID/ZEYAD,COR/VIOLETA/PAD/ELAYNE IN-HOUSE(OR EMERGENT/ADD-ON),NP SWAB IN TRANSPORT MEDIA 3-4 HR TAT, RT-PCR - Swab, Nasopharynx [[id number]]  (Normal) Collected: 01/11/22 1828    Lab Status: Final result Specimen: Swab from Nasopharynx Updated: 01/11/22 1857     COVID19 Not Detected    Narrative:      Fact  sheet for providers: https://www.fda.gov/media/536956/download     Fact sheet for patients: https://www.fda.gov/media/294482/download  Fact sheet for providers: https://www.fda.gov/media/870881/download    Fact sheet for patients: https://www.[employer].gov/media/232061/download    Test performed by PCR.          ECG/EMG Results (most recent)     None                  No radiology results for the last 7 days      Estimated Creatinine Clearance: 95.8 mL/min (by C-G formula based on SCr of 0.83 mg/dL).    Assessment/Plan   Assessment/Plan       Active Hospital Problems    Diagnosis  POA   • Back pain [M54.9]  Yes      Resolved Hospital Problems   No resolved problems to display.     Back pain with some urinary incontinence and paresthesias  -BMP and CBC within normal limits  -UA shows 1+ ketones but is otherwise unremarkable  -Review of records from the ED notes Patient had CT cervical spine 11/6/2021. MRI brain impression normal for patient's age. MRI cervical spine no acute osseous abnormality. Multilevel degenerative changes throughout the spine with multilevel canal stenosis most right C4-C5. Varying degrees of neuroforaminal narrowing described in findings.Patient had MRI of the lumbar spine without contrast on 11/15/2021.  Which is after her fall and after her describes symptoms today began.  No acute abnormalities of lumbar spine such as osseous fracture contusion.  Impression as follows.IMPRESSION:  1. There are no acute abnormalities lumbar spine such as osseous fracture contusion.  2. There is moderate levoconvex curvature of the lumbar spine with degenerative disc disease and moderate to severe facet degenerative change.  3. There is severe neural foraminal narrowing on the left the L4-L5 level there is moderate bilateral osseous neural foraminal narrowing L3-L4 left greater than right. There is mild left neural foraminal narrowing L2-L3.  4. There is mild central narrowing left greater than right  L3-L4.)  -Maintain falls precautions  -Morphine given in the ED, continue and add Lyrica and Lidoderm  -Neurosurgery consulted, repeat cervical MRI planned  -PT/OT consulted     Hypertension  -Well controlled with a most recent blood pressure of 115/71  - Continue losartan hydrochlorothiazide  - Monitor while admitted     Depression  -Zoloft     Obesity (BMI: 30.13)  -Encourage diet lifestyle modification            VTE Prophylaxis -   Mechanical Order History:      Ordered        01/11/22 1902  Place Sequential Compression Device  Once            01/11/22 1902  Maintain Sequential Compression Device  Continuous                    Pharmalogical Order History:     None          CODE STATUS:    Code Status and Medical Interventions:   Ordered at: 01/11/22 1800     Level Of Support Discussed With:    Patient     Code Status (Patient has no pulse and is not breathing):    CPR (Attempt to Resuscitate)     Medical Interventions (Patient has pulse or is breathing):    Full Support       This patient has been examined wearing personal protective equipment.     I discussed the patient's findings and my recommendations with patient and nursing staff.      Signature:Electronically signed by Feliberto Maldonado PA-C, 01/12/22, 2:54 PM EST.

## 2022-01-12 NOTE — DISCHARGE PLACEMENT REQUEST
"Marjoire FLORENTINO (56 y.o. Female)             Date of Birth Social Security Number Address Home Phone MRN    1965  [id number] [facility] [place] [address] [phone] [id number]    Religious Marital Status             None        Admission Date Admission Type Admitting Provider Attending Provider Department, Room/Bed    1/11/22 Emergency Issa Taylor MD Hubert, Shon T, MD [facility] OBSERVATION, 104/1    Discharge Date Discharge Disposition Discharge Destination                         Attending Provider: Issa Taylor MD    Allergies: Ibuprofen, Lisinopril    Isolation: None   Infection: None   Code Status: CPR   Advance Care Planning Activity    Ht: 177.8 cm (70\")   Wt: 95.3 kg (210 lb)    Admission Cmt: None   Principal Problem: None                Active Insurance as of 1/11/2022     Primary Coverage     Payor Plan Insurance Group Employer/Plan Group    WORKERS COMPENSATION MISC WORKERS COMPENSATION      Coverage Address Coverage Phone Number Coverage Fax Number Effective Dates    [address] [address] [phone]  10/22/2021 - None Entered    [place] [address]       Subscriber Name Subscriber Birth Date Member ID       MARJORIE FLORENTINO 1965 [id number]                 Emergency Contacts      (Rel.) Home Phone Work Phone Mobile Phone    TAE CHONG (Daughter) -- -- [phone]            "

## 2022-01-12 NOTE — PLAN OF CARE
Problem: Adult Inpatient Plan of Care  Goal: Plan of Care Review  Outcome: Ongoing, Progressing  Flowsheets (Taken 1/11/2022 2308)  Progress: no change  Plan of Care Reviewed With: patient  Outcome Summary: pt admitted with back pain. slowly able to stand with walker. able to stand for short periods of lyndsay. neuro md to see her in AM  Goal: Patient-Specific Goal (Individualized)  Outcome: Ongoing, Progressing  Goal: Absence of Hospital-Acquired Illness or Injury  Outcome: Ongoing, Progressing  Intervention: Identify and Manage Fall Risk  Recent Flowsheet Documentation  Taken 1/11/2022 2300 by Aure Mejia RN  Safety Promotion/Fall Prevention:   safety round/check completed   nonskid shoes/slippers when out of bed   lighting adjusted  Taken 1/11/2022 1932 by Aure Mejia RN  Safety Promotion/Fall Prevention:   safety round/check completed   nonskid shoes/slippers when out of bed   lighting adjusted   fall prevention program maintained  Intervention: Prevent Skin Injury  Recent Flowsheet Documentation  Taken 1/11/2022 2300 by Aure Mejia RN  Body Position: supine  Taken 1/11/2022 1932 by Aure Mejia RN  Body Position: supine  Goal: Optimal Comfort and Wellbeing  Outcome: Ongoing, Progressing  Goal: Readiness for Transition of Care  Outcome: Ongoing, Progressing  Intervention: Mutually Develop Transition Plan  Recent Flowsheet Documentation  Taken 1/11/2022 1933 by Aure Mejia RN  Transportation Anticipated: family or friend will provide  Patient/Family Anticipated Services at Transition:   Patient/Family Anticipates Transition to: home with family  Taken 1/11/2022 1929 by Aure Mejia RN  Equipment Currently Used at Home: cane, straight     Problem: Skin Injury Risk Increased  Goal: Skin Health and Integrity  Outcome: Ongoing, Progressing  Intervention: Optimize Skin Protection  Recent Flowsheet Documentation  Taken 1/11/2022 2300 by Aure Mejia  RN  Head of Bed (HOB): HOB at 45 degrees  Taken 1/11/2022 1932 by Aure Mejia RN  Head of Bed (HOB): HOB at 30-45 degrees     Problem: Pain Acute  Goal: Optimal Pain Control  Outcome: Ongoing, Progressing  Intervention: Optimize Psychosocial Wellbeing  Recent Flowsheet Documentation  Taken 1/11/2022 2300 by Aure Mejia RN  Diversional Activities: smartphone  Taken 1/11/2022 1932 by Aure Mejia RN  Diversional Activities:   smartphone   television   Goal Outcome Evaluation:  Plan of Care Reviewed With: patient        Progress: no change  Outcome Summary: pt admitted with back pain. slowly able to stand with walker. able to stand for short periods of lyndsay. neuro md to see her in AM

## 2022-01-12 NOTE — CASE MANAGEMENT/SOCIAL WORK
Discharge Planning Assessment   Emmanuel     Patient Name: Marjorie FLORENTINO  MRN: [id number]  Today's Date: 1/12/2022    Admit Date: 1/11/2022     Discharge Needs Assessment     Row Name 01/12/22 1146       Living Environment    Lives With child(roshan), adult    Name(s) of Who Lives With Patient Daughter Alycia    Current Living Arrangements home/apartment/condo    Primary Care Provided by self    Provides Primary Care For no one    Able to Return to Prior Arrangements yes       Resource/Environmental Concerns    Resource/Environmental Concerns none    Transportation Concerns car, none       Transition Planning    Patient/Family Anticipates Transition to home with family    Patient/Family Anticipated Services at Transition none    Transportation Anticipated family or friend will provide       Discharge Needs Assessment    Readmission Within the Last 30 Days no previous admission in last 30 days    Equipment Currently Used at Home cane, straight    Concerns to be Addressed discharge planning    Equipment Needed After Discharge walker, rolling    Provided Post Acute Provider List? Yes    Post Acute Provider List DME Supplier    Delivered To Patient    Method of Delivery In person    Patient's Choice of Community Agency(s) [facility]               Discharge Plan     Row Name 01/12/22 4391       Plan    Plan Anticipate Routine home    Patient/Family in Agreement with Plan yes    Plan Comments Met with Patient at bedside, Lives at home with Adult daughter. Independent with ADL's, uses cane at home but requested walker, Order placed and [facility] notified to deliver to room prior to discharge. PCP and Pharmacy verified, able to afford medicaitons.              Continued Care and Services - Admitted Since 1/11/2022     Durable Medical Equipment     Service Provider Request Status Selected Services Address Phone Fax Patient Preferred    [facility]  Pending - Request Sent N/A [address], [place] [id number]  [phone] --                 Demographic Summary     Row Name 01/12/22 1145       General Information    Admission Type observation    Arrived From emergency department    Referral Source admission list    Reason for Consult discharge planning    Preferred Language English     Used During This Interaction no               Functional Status     Row Name 01/12/22 1145       Functional Status    Usual Activity Tolerance moderate    Current Activity Tolerance moderate       Functional Status, IADL    Medications independent    Meal Preparation independent    Housekeeping independent    Laundry independent    Shopping independent       Mental Status    General Appearance WDL WDL       Mental Status Summary    Recent Changes in Mental Status/Cognitive Functioning no changes              Met with patient at bedside wearing mask and goggles, Spent less than 15 minutes in room at greater than 6 feet distance.           Olga Son RN

## 2022-01-12 NOTE — CONSULTS
"[facility]   Consult Note    Patient Name: Marjorie FLORENTINO  : 1965  MRN: [id number]  Primary Care Physician:  Lurdes West APRN  Referring Physician: EZIO Moise  Date of admission: 2022    Inpatient Neurosurgery Consult  Consult performed by: Tierra Mukherjee APRN  Consult ordered by: David Leger MD        Subjective   Subjective     Reason for Consult/ Chief Complaint: Neck and back pain    History of Present Illness  Marjorie FLORENTINO is a 56 y.o. female who presented to [facility] emergency department last evening with complaints of neck and back pain and hand and leg weakness and urinary incontinence.  Patient reports suffering a fall in October at work that precipitated her symptoms.  She has been managed with her PCP and had neural axis imaging completed in November.  In the ED, patient's neurological exam was benign but patient has been unable to get out of bed unassisted due to pain and discomfort.  Neurosurgery has been consulted for further assessment/evaluation and to offer recommendations.  Patient reports falling in November and where she describes hyper extension of her neck.  She has continued with neck and back pain with neck pain being worse.  She underwent imaging immediately after the fall.  Since the fall patient has had a progressive decline in her ability to ambulate, with her dexterity, with her arm strength, and with her leg strength.  She feels like she has to look at her feet to get them to move.  She describes a \"buzzing \"in her left leg and in her chin.  She describes having problems swallowing.  He also reports urinary issues.  She reports taking only Tylenol.    Review of Systems   HENT: Positive for trouble swallowing.    Genitourinary: Positive for urgency.   Musculoskeletal: Positive for back pain, gait problem and neck pain.   Neurological: Positive for weakness.   All other systems reviewed and are negative.       Personal History     Past " Medical History:   Diagnosis Date   • Hypertension        Past Surgical History:   Procedure Laterality Date   •  SECTION      x3        Family History: family history includes COPD in her mother; Coronary artery disease in her mother; Hypertension in her father and sister; Kidney disease in her father; Stroke in her father, maternal grandmother, and paternal grandmother. Otherwise pertinent FHx was reviewed and not pertinent to current issue.    Social History:  reports that she has never smoked. She has never used smokeless tobacco. Drug use questions deferred to the physician. She reports that she does not drink alcohol.    Home Medications:   Turmeric, Vitamin D, baclofen, hydroCHLOROthiazide, losartan, magnesium gluconate, and sertraline    Allergies:  Allergies   Allergen Reactions   • Ibuprofen Angioedema   • Lisinopril Hives       Objective    Objective     Vitals:  Temp:  [97.9 °F (36.6 °C)-99.3 °F (37.4 °C)] 98.7 °F (37.1 °C)  Heart Rate:  [64-98] 64  Resp:  [18] 18  BP: (115-167)/(62-90) 115/71    Physical Exam  HENT:      Head: Normocephalic.   Cardiovascular:      Rate and Rhythm: Tachycardia present.   Pulmonary:      Effort: Pulmonary effort is normal.   Abdominal:      Palpations: Abdomen is soft.   Musculoskeletal:      Right hand: Decreased strength of finger abduction.      Left hand: Decreased strength of finger abduction.      Cervical back: Pain with movement and muscular tenderness present.      Lumbar back: Tenderness present.   Skin:     Capillary Refill: Capillary refill takes less than 2 seconds.   Neurological:      Mental Status: She is alert and oriented to person, place, and time.      Cranial Nerves: Cranial nerves are intact.      Sensory: Sensation is intact.      Motor: Weakness present.      Coordination: Coordination is intact.      Gait: Gait abnormal.      Deep Tendon Reflexes: Reflexes abnormal.      Reflex Scores:       Tricep reflexes are 3+ on the right side and 3+  on the left side.       Bicep reflexes are 3+ on the right side and 3+ on the left side.       Brachioradialis reflexes are 3+ on the right side and 3+ on the left side.       Patellar reflexes are 4+ on the right side and 4+ on the left side.       Achilles reflexes are 3+ on the right side and 3+ on the left side.  Psychiatric:         Attention and Perception: Attention normal.         Mood and Affect: Mood is depressed. Affect is tearful.     Strong positive Мария  Clonus with some spasticity      Result Review    Result Review:  I have personally reviewed the results from the time of this admission to 1/12/2022 08:32 EST and agree with these findings:  []  Laboratory  []  Microbiology  [x]  Radiology  []  EKG/Telemetry   []  Cardiology/Vascular   []  Pathology  []  Old records  []  Other:  Most notable findings include: MRI of the cervical spine demonstrated a focal disc protrusion at C4-C5 with moderate canal stenosis.  No cord compression.  Lumbar spine MRI and x-rays demonstrate widespread osteopenia and moderate degenerative disc disease and moderate to advanced degenerative facet changes resulting in varying degrees of neuroforaminal narrowing worse on the left at L4-L5 and right L2-L3 with no significant canal stenosis.  There is degenerative retrolisthesis at L4-L5.  Levocurvature with 27.9 degree curve.     Assessment/Plan   Assessment / Plan     Brief Patient Summary:  Marjorie FLORENTINO is a 56 y.o. female who presented to [facility] emergency department with complaints of continued neck, back pain as well as arm and leg weakness after suffering a fall hyperextending her neck.  Patient has had progressive neurologic decline in her upper and lower extremities with loss of dexterity, loss of  strength, gait issues.  Patient is severly myelopathic with MJOA score of 10 which does not necessarily correlate on current imaging.  Patient will need new imaging of her neck to evaluate for cord  compression or signal change suggestive of cord contusion.  Further recommendations after review of imaging.      Active Hospital Problems:  Active Hospital Problems    Diagnosis    • Back pain      Plan:       MRI cervical spine with and without contrast    This patient was examined wearing appropriate personal protective equipment.     Patient findings were discussed with patient, and Dr. Sorto.    EZIO Quevedo

## 2022-01-12 NOTE — NURSING NOTE
Pt able to stand with walker and stated she wanted to walk to bathroom. Pt weak . And I suggested she use bedside commode for safety . Pt able to use walker to stand and pivot to get back into bed.

## 2022-01-13 PROBLEM — G95.9 CERVICAL MYELOPATHY (HCC): Status: ACTIVE | Noted: 2022-01-13

## 2022-01-13 PROBLEM — M50.00 INTERVERTEBRAL CERVICAL DISC DISORDER WITH MYELOPATHY, CERVICAL REGION: Status: ACTIVE | Noted: 2022-01-11

## 2022-01-13 PROBLEM — M54.50 ACUTE BILATERAL LOW BACK PAIN: Status: ACTIVE | Noted: 2022-01-13

## 2022-01-13 PROBLEM — R32 URINARY INCONTINENCE: Status: ACTIVE | Noted: 2022-01-13

## 2022-01-13 PROBLEM — E66.9 OBESITY (BMI 30-39.9): Status: ACTIVE | Noted: 2022-01-13

## 2022-01-13 LAB
ABO GROUP BLD: NORMAL
BLD GP AB SCN SERPL QL: NEGATIVE
MRSA DNA SPEC QL NAA+PROBE: NORMAL
RH BLD: POSITIVE
T&S EXPIRATION DATE: NORMAL

## 2022-01-13 PROCEDURE — 86901 BLOOD TYPING SEROLOGIC RH(D): CPT | Performed by: NEUROLOGICAL SURGERY

## 2022-01-13 PROCEDURE — 86900 BLOOD TYPING SEROLOGIC ABO: CPT | Performed by: NEUROLOGICAL SURGERY

## 2022-01-13 PROCEDURE — 86901 BLOOD TYPING SEROLOGIC RH(D): CPT

## 2022-01-13 PROCEDURE — 99232 SBSQ HOSP IP/OBS MODERATE 35: CPT | Performed by: NURSE PRACTITIONER

## 2022-01-13 PROCEDURE — 86900 BLOOD TYPING SEROLOGIC ABO: CPT

## 2022-01-13 PROCEDURE — 86850 RBC ANTIBODY SCREEN: CPT | Performed by: NEUROLOGICAL SURGERY

## 2022-01-13 PROCEDURE — 87641 MR-STAPH DNA AMP PROBE: CPT | Performed by: NEUROLOGICAL SURGERY

## 2022-01-13 RX ORDER — HYDROCODONE BITARTRATE AND ACETAMINOPHEN 5; 325 MG/1; MG/1
1 TABLET ORAL EVERY 4 HOURS PRN
Status: DISCONTINUED | OUTPATIENT
Start: 2022-01-13 | End: 2022-01-14

## 2022-01-13 RX ORDER — BISACODYL 10 MG
10 SUPPOSITORY, RECTAL RECTAL DAILY PRN
Status: DISCONTINUED | OUTPATIENT
Start: 2022-01-13 | End: 2022-01-21 | Stop reason: HOSPADM

## 2022-01-13 RX ORDER — POLYETHYLENE GLYCOL 3350 17 G/17G
17 POWDER, FOR SOLUTION ORAL DAILY PRN
Status: DISCONTINUED | OUTPATIENT
Start: 2022-01-13 | End: 2022-01-21 | Stop reason: HOSPADM

## 2022-01-13 RX ORDER — BISACODYL 5 MG/1
5 TABLET, DELAYED RELEASE ORAL DAILY PRN
Status: DISCONTINUED | OUTPATIENT
Start: 2022-01-13 | End: 2022-01-21 | Stop reason: HOSPADM

## 2022-01-13 RX ORDER — AMOXICILLIN 250 MG
2 CAPSULE ORAL 2 TIMES DAILY
Status: DISCONTINUED | OUTPATIENT
Start: 2022-01-13 | End: 2022-01-21 | Stop reason: HOSPADM

## 2022-01-13 RX ADMIN — ACETAMINOPHEN 650 MG: 325 TABLET, FILM COATED ORAL at 16:25

## 2022-01-13 RX ADMIN — HYDROCHLOROTHIAZIDE 25 MG: 25 TABLET ORAL at 08:32

## 2022-01-13 RX ADMIN — ACETAMINOPHEN 650 MG: 325 TABLET, FILM COATED ORAL at 09:49

## 2022-01-13 RX ADMIN — ACETAMINOPHEN 650 MG: 325 TABLET, FILM COATED ORAL at 02:08

## 2022-01-13 RX ADMIN — SENNOSIDES AND DOCUSATE SODIUM 2 TABLET: 8.6; 5 TABLET ORAL at 11:47

## 2022-01-13 RX ADMIN — SODIUM CHLORIDE, PRESERVATIVE FREE 10 ML: 5 INJECTION INTRAVENOUS at 20:01

## 2022-01-13 RX ADMIN — LOSARTAN POTASSIUM 25 MG: 25 TABLET, FILM COATED ORAL at 08:32

## 2022-01-13 RX ADMIN — ACETAMINOPHEN 650 MG: 325 TABLET, FILM COATED ORAL at 05:51

## 2022-01-13 RX ADMIN — LIDOCAINE 1 PATCH: 50 PATCH TOPICAL at 08:32

## 2022-01-13 RX ADMIN — SERTRALINE 25 MG: 25 TABLET, FILM COATED ORAL at 08:32

## 2022-01-13 RX ADMIN — SENNOSIDES AND DOCUSATE SODIUM 2 TABLET: 8.6; 5 TABLET ORAL at 20:01

## 2022-01-13 RX ADMIN — SODIUM CHLORIDE, PRESERVATIVE FREE 10 ML: 5 INJECTION INTRAVENOUS at 08:33

## 2022-01-13 RX ADMIN — PREGABALIN 75 MG: 75 CAPSULE ORAL at 05:48

## 2022-01-13 RX ADMIN — ACETAMINOPHEN 650 MG: 325 TABLET, FILM COATED ORAL at 20:42

## 2022-01-13 NOTE — PROGRESS NOTES
"[facility] Observation Unit progress note    Patient Name: Marjorie FLORENTINO  : 1965  MRN: [id number]  Primary Care Physician: Lurdes West APRN  Date of admission: 2022     Patient Care Team:  Lurdes West APRN as PCP - General          Subjective   History Present Illness     Chief Complaint:   Chief Complaint   Patient presents with   • Back Pain       Obtained from ED provider HPI on 2022:  Patient is a 56-year-old female presents emergency department with complaint of back pain, and \"back seizing\".  Patient reports this became worse today.  She reports she had a fall in October, and has had multiple symptoms since that time, with continued back pain.  Patient reports she has had weakness in her hands, she urinates when she stands up, muscle spasms, and generally feeling weak.  Patient has been seen by her primary care provider, has had MRI of the brain, cervical spine and lumbar spine since her symptoms began.  She reports her symptoms have not changed, but her \"back seizing\" is what prompted her visit to the ED today.  She denies any other bowel or bladder symptoms.  No extremity weakness.     2022: Patient confirms the HPI noted above including several month history of worsening low back pain following a fall in 2021 which seems to have become significantly worse over the past several weeks.  She notes pain in the lower midportion and right side of her back described as a \"seizing pain\" made somewhat worse with movement but no other obvious provoking or palliative factors.  Some associated bladder incontinence as well as gait abnormality because her \"feet keep dropping\" is also noted.  She denies any other recent trauma or falls though patient does report some bilateral distal paresthesias in all extremities.  She denies any dyspnea, nausea or vomiting, cough or fever or peripheral edema.  She confirms compliance all of her outpatient medical therapies.     2022: Patient " reports she did generally well throughout the night with some waxing and waning intensity of her pain which seems to be adequately controlled with pain medication.  She was observed walking about the unit with the assistance of the walker and appeared to have a steady gait.  She does report significant anxiety regarding planned procedure for the following morning      ROS   Review of Systems   Constitutional: Negative.   HENT: Negative.    Eyes: Negative.    Cardiovascular: Negative.    Respiratory: Negative.    Endocrine: Negative.    Skin: Negative.    Musculoskeletal: Positive for back pain and falls.   Gastrointestinal: Negative.    Neurological: Positive for loss of balance and paresthesias.        Gait abnormalities   Psychiatric/Behavioral: Negative        Personal History     Past Medical History:   Past Medical History:   Diagnosis Date   • Hypertension        Surgical History:      Past Surgical History:   Procedure Laterality Date   •  SECTION      x3            Family History: family history includes COPD in her mother; Coronary artery disease in her mother; Hypertension in her father and sister; Kidney disease in her father; Stroke in her father, maternal grandmother, and paternal grandmother. Otherwise pertinent FHx was reviewed and unremarkable.     Social History:  reports that she has never smoked. She has never used smokeless tobacco. Drug use questions deferred to the physician. She reports that she does not drink alcohol.      Medications:  Prior to Admission medications    Medication Sig Start Date End Date Taking? Authorizing Provider   baclofen (LIORESAL) 10 MG tablet Take 1 tablet by mouth 3 (Three) Times a Day. 22   uLrdes West APRN   Cholecalciferol (Vitamin D) 50 MCG (2000) capsule Take  by mouth Daily.    Provider, MD Monica   hydroCHLOROthiazide (HYDRODIURIL) 25 MG tablet TAKE ONE TABLET BY MOUTH DAILY 12/3/21   Lurdes West APRN   losartan (Cozaar) 25 MG  tablet Take 1 tablet by mouth Daily. 11/8/21   Lurdes West APRN   magnesium gluconate (MAGONATE) 500 MG tablet Take 250 mg by mouth Daily.    Provider, Historical, MD   sertraline (ZOLOFT) 50 MG tablet Take 25 mg by mouth Daily.    Provider, Historical, MD   Turmeric (QC TUMERIC COMPLEX PO) Take  by mouth Daily.    Provider, Historical, MD       Allergies:    Allergies   Allergen Reactions   • Ibuprofen Angioedema   • Lisinopril Hives       Objective   Objective     Vital Signs  Temp:  [98 °F (36.7 °C)-98.4 °F (36.9 °C)] 98.2 °F (36.8 °C)  Heart Rate:  [77-97] 86  Resp:  [18] 18  BP: (105-145)/(60-85) 120/60  SpO2:  [94 %-100 %] 97 %  on   ;   Device (Oxygen Therapy): room air  Body mass index is 30.13 kg/m².    Physical Exam  Vitals reviewed.   Constitutional:       General: She is not in acute distress.     Appearance: Normal appearance. She is obese. She is not ill-appearing, toxic-appearing or diaphoretic.   HENT:      Head: Normocephalic and atraumatic.      Right Ear: External ear normal.      Left Ear: External ear normal.      Nose: Nose normal.      Mouth/Throat:      Mouth: Mucous membranes are moist.   Eyes:      Extraocular Movements: Extraocular movements intact.   Cardiovascular:      Rate and Rhythm: Normal rate and regular rhythm.      Pulses: Normal pulses.      Heart sounds: Normal heart sounds.   Pulmonary:      Effort: Pulmonary effort is normal.      Breath sounds: Normal breath sounds.   Abdominal:      General: Bowel sounds are normal. There is no distension.      Palpations: Abdomen is soft.      Tenderness: There is no abdominal tenderness.   Musculoskeletal:         General: Normal range of motion.      Cervical back: Normal range of motion.      Right lower leg: No edema.      Left lower leg: No edema.   Skin:     General: Skin is warm and dry.      Capillary Refill: Capillary refill takes less than 2 seconds.   Neurological:      General: No focal deficit present.      Mental  Status: She is alert and oriented to person, place, and time.      Comments: Patient gait appears stable with the assistance of walker   Psychiatric:         Mood and Affect: Mood normal.         Behavior: Behavior normal.         Thought Content: Thought content normal.         Judgment: Judgment normal.           Results Review:  I have personally reviewed most recent lab results and radiology images and interpretations and agree with findings, most notably: BMP, CBC, COVID-19 test and previous CT and MRI.    Results from last 7 days   Lab Units 01/12/22  0410   WBC 10*3/mm3 7.20   HEMOGLOBIN g/dL 12.2   HEMATOCRIT % 34.8   PLATELETS 10*3/mm3 342     Results from last 7 days   Lab Units 01/12/22  0410   SODIUM mmol/L 141   POTASSIUM mmol/L 4.5   CHLORIDE mmol/L 104   CO2 mmol/L 30.0*   BUN mg/dL 17   CREATININE mg/dL 0.83   GLUCOSE mg/dL 92   CALCIUM mg/dL 9.5     Estimated Creatinine Clearance: 95.8 mL/min (by C-G formula based on SCr of 0.83 mg/dL).  Brief Urine Lab Results  (Last result in the past 365 days)      Color   Clarity   Blood   Leuk Est   Nitrite   Protein   CREAT   Urine HCG        01/11/22 1732 Yellow   Clear   Negative   Negative   Negative   Negative                 Microbiology Results (last 10 days)     Procedure Component Value - Date/Time    COVID PRE-OP / PRE-PROCEDURE SCREENING ORDER (NO ISOLATION) - Swab, Nasopharynx [[id number]]  (Normal) Collected: 01/11/22 1828    Lab Status: Final result Specimen: Swab from Nasopharynx Updated: 01/11/22 1857    Narrative:      The following orders were created for panel order COVID PRE-OP / PRE-PROCEDURE SCREENING ORDER (NO ISOLATION) - Swab, Nasopharynx.  Procedure                               Abnormality         Status                     ---------                               -----------         ------                     COVID-19,CEPHEID/ZEYAD,CO...[[id number]]  Normal              Final result                 Please view results for these tests on  the individual orders.    COVID-19,CEPHEID/ZEYAD,COR/VIOLETA/PAD/ELAYNE IN-HOUSE(OR EMERGENT/ADD-ON),NP SWAB IN TRANSPORT MEDIA 3-4 HR TAT, RT-PCR - Swab, Nasopharynx [[id number]]  (Normal) Collected: 01/11/22 1828    Lab Status: Final result Specimen: Swab from Nasopharynx Updated: 01/11/22 1857     COVID19 Not Detected    Narrative:      Fact sheet for providers: https://www.fda.gov/media/898442/download     Fact sheet for patients: https://www.fda.gov/media/708392/download  Fact sheet for providers: https://www.fda.gov/media/315107/download    Fact sheet for patients: https://www.[employer] On-[employer].gov/media/940652/download    Test performed by PCR.          ECG/EMG Results (most recent)     None                  MRI Cervical Spine With & Without Contrast    Result Date: 1/12/2022   1. Multilevel cervical spondylosis, as detailed above. 2. At C3-C4, there is moderate to severe spinal canal stenosis, mild indentation of the cervical cord, moderate to severe right neural foraminal narrowing, and moderate left neural foraminal narrowing. 3. At C4-C5, there is moderate to severe spinal canal stenosis and indentation of the cervical cord. 4. At C5-C6, there is moderate to severe right neural foraminal narrowing. 5. No abnormal enhancement.   Electronically Signed By-Josh Chu MD On:1/12/2022 4:17 PM This report was finalized on [id number] by  Josh Chu MD.        Estimated Creatinine Clearance: 95.8 mL/min (by C-G formula based on SCr of 0.83 mg/dL).    Assessment/Plan   Assessment/Plan       Active Hospital Problems    Diagnosis  POA   • Back pain [M54.9]  Yes      Resolved Hospital Problems   No resolved problems to display.     Back pain with some urinary incontinence and paresthesias  -BMP and CBC within normal limits  -UA shows 1+ ketones but is otherwise unremarkable  -Review of records from the ED notes Patient had CT cervical spine 11/6/2021. MRI brain impression normal for patient's age. MRI cervical spine no acute osseous  "abnormality. Multilevel degenerative changes throughout the spine with multilevel canal stenosis most right C4-C5. Varying degrees of neuroforaminal narrowing described in findings.Patient had MRI of the lumbar spine without contrast on 11/15/2021.  Which is after her fall and after her describes symptoms today began.  No acute abnormalities of lumbar spine such as osseous fracture contusion.  Impression as follows.IMPRESSION:  1. There are no acute abnormalities lumbar spine such as osseous fracture contusion.  2. There is moderate levoconvex curvature of the lumbar spine with degenerative disc disease and moderate to severe facet degenerative change.  3. There is severe neural foraminal narrowing on the left the L4-L5 level there is moderate bilateral osseous neural foraminal narrowing L3-L4 left greater than right. There is mild left neural foraminal narrowing L2-L3.  4. There is mild central narrowing left greater than right L3-L4.)  -Maintain falls precautions  -Morphine given in the ED, continue and add Lidoderm (Lyrica previously initiated has been discontinued due to patient reporting some \"loopy feelings\" with this medication)  -Neurosurgery consulted, repeat cervical MRI showed multilevel cervical spondylolysis with moderate to severe spinal canal stenosis and mild indentation of the cervical cord, moderate to severe right neuroforaminal narrowing and moderate left neuroforaminal narrowing at C3-4.  Moderate to severe spinal canal stenosis and indentation of the cervical cord at C4-5 and moderate to severe right neuroforaminal narrowing at C5-6  -Neurosurgery planning operative procedure on 1/14/2022  -Hospitalist consulted for further management     Hypertension  -Well controlled with a most recent blood pressure of 120/60  - Continue losartan hydrochlorothiazide  - Monitor while admitted     Depression  -Zoloft     Obesity (BMI: 30.13)  -Encourage diet lifestyle modification            VTE Prophylaxis - "   Mechanical Order History:      Ordered        01/11/22 1902  Place Sequential Compression Device  Once            01/11/22 1902  Maintain Sequential Compression Device  Continuous                    Pharmalogical Order History:     None          CODE STATUS:    Code Status and Medical Interventions:   Ordered at: 01/11/22 1800     Level Of Support Discussed With:    Patient     Code Status (Patient has no pulse and is not breathing):    CPR (Attempt to Resuscitate)     Medical Interventions (Patient has pulse or is breathing):    Full Support       This patient has been examined wearing personal protective equipment.     I discussed the patient's findings and my recommendations with patient and nursing staff.      Signature:Electronically signed by Feliberto Maldonado PA-C, 01/13/22, 9:19 AM EST.

## 2022-01-13 NOTE — PLAN OF CARE
Problem: Adult Inpatient Plan of Care  Goal: Plan of Care Review  Outcome: Ongoing, Progressing  Flowsheets (Taken 1/13/2022 1452)  Progress: improving  Plan of Care Reviewed With: patient  Outcome Summary: Pt is schedule for surgery tomorrow with Dr Meyers. Possibly going to rehab after surgery. Managed pain with tylenol. Up in chair. VSS. Safety maintain, will ctm.  Goal: Patient-Specific Goal (Individualized)  Outcome: Ongoing, Progressing  Goal: Absence of Hospital-Acquired Illness or Injury  Outcome: Ongoing, Progressing  Intervention: Identify and Manage Fall Risk  Recent Flowsheet Documentation  Taken 1/13/2022 1342 by Alexandra Mancini RN  Safety Promotion/Fall Prevention:   room organization consistent   safety round/check completed  Taken 1/13/2022 1136 by Alexandra Mancini RN  Safety Promotion/Fall Prevention:   safety round/check completed   room organization consistent  Taken 1/13/2022 0949 by Alexandra Mancini RN  Safety Promotion/Fall Prevention:   safety round/check completed   room organization consistent  Taken 1/13/2022 0705 by Alexandra Mancini RN  Safety Promotion/Fall Prevention:   room organization consistent   safety round/check completed  Intervention: Prevent Skin Injury  Recent Flowsheet Documentation  Taken 1/13/2022 1342 by Alexandra Mancini RN  Body Position: position changed independently  Taken 1/13/2022 1136 by Alexandra Mancini RN  Body Position: position changed independently  Taken 1/13/2022 0949 by Alexandra Mancini RN  Body Position: position changed independently  Taken 1/13/2022 0705 by Alexandra Mancini RN  Body Position:   position changed independently   supine  Skin Protection: adhesive use limited  Intervention: Prevent Infection  Recent Flowsheet Documentation  Taken 1/13/2022 1342 by Alexandra Mancini RN  Infection Prevention: rest/sleep promoted  Taken 1/13/2022 0705 by Alexandra Mancini RN  Infection Prevention:   rest/sleep promoted   single  patient room provided  Goal: Optimal Comfort and Wellbeing  Outcome: Ongoing, Progressing  Intervention: Provide Person-Centered Care  Recent Flowsheet Documentation  Taken 1/13/2022 1342 by Alexandra Mancini RN  Trust Relationship/Rapport: care explained  Taken 1/13/2022 1136 by Alexandra Mancini RN  Trust Relationship/Rapport: care explained  Taken 1/13/2022 0705 by Alexandra Mancini RN  Trust Relationship/Rapport: care explained  Goal: Readiness for Transition of Care  Outcome: Ongoing, Progressing     Problem: Skin Injury Risk Increased  Goal: Skin Health and Integrity  Outcome: Ongoing, Progressing  Intervention: Optimize Skin Protection  Recent Flowsheet Documentation  Taken 1/13/2022 1136 by Alexandra Mancini RN  Pressure Reduction Techniques: frequent weight shift encouraged  Taken 1/13/2022 0705 by Alexandra Mancini RN  Pressure Reduction Techniques: frequent weight shift encouraged  Head of Bed (HOB): HOB at 45 degrees  Skin Protection: adhesive use limited     Problem: Pain Acute  Goal: Optimal Pain Control  Outcome: Ongoing, Progressing  Intervention: Optimize Psychosocial Wellbeing  Recent Flowsheet Documentation  Taken 1/13/2022 0705 by Alexandra Mancini RN  Diversional Activities:   smartphone   tablet   Goal Outcome Evaluation:  Plan of Care Reviewed With: patient        Progress: improving  Outcome Summary: Pt is schedule for surgery tomorrow with Dr Meyers. Possibly going to rehab after surgery. Managed pain with tylenol. Up in chair. VSS. Safety maintain, will ctm.   Age appropriate behavior

## 2022-01-13 NOTE — SIGNIFICANT NOTE
01/13/22 1254   Rehab Time/Intention   Session Not Performed   (patient to have surgery due to mod/severe stenosis.will follow up after procedure, pt up in halls with nursing assist and rw)   Recommendation   PT - Next Appointment 01/14/22

## 2022-01-13 NOTE — NURSING NOTE
Pt doing well with walking with walker down the [place].min assist. Pt fairly steady with walker . No c/o pain. Able to get self back into bed by self

## 2022-01-13 NOTE — PLAN OF CARE
Problem: Adult Inpatient Plan of Care  Goal: Plan of Care Review  Outcome: Ongoing, Progressing  Flowsheets (Taken 1/13/2022 0246)  Outcome Summary: pt able to walk to bathroom with walker and min assist. medicated with tylenol. voicing relief with that.  Goal: Patient-Specific Goal (Individualized)  Outcome: Ongoing, Progressing  Goal: Absence of Hospital-Acquired Illness or Injury  Outcome: Ongoing, Progressing  Intervention: Identify and Manage Fall Risk  Recent Flowsheet Documentation  Taken 1/12/2022 2300 by Aure Mejia RN  Safety Promotion/Fall Prevention:   safety round/check completed   nonskid shoes/slippers when out of bed   lighting adjusted  Taken 1/12/2022 1930 by Aure Mejia RN  Safety Promotion/Fall Prevention: safety round/check completed  Intervention: Prevent Skin Injury  Recent Flowsheet Documentation  Taken 1/12/2022 2300 by Aure Mejia RN  Body Position: position changed independently  Taken 1/12/2022 1930 by Aure Mejia RN  Body Position: position changed independently  Goal: Optimal Comfort and Wellbeing  Outcome: Ongoing, Progressing  Intervention: Provide Person-Centered Care  Recent Flowsheet Documentation  Taken 1/12/2022 2300 by Aure Mejia RN  Trust Relationship/Rapport: care explained  Taken 1/12/2022 1930 by Aure Mejia RN  Trust Relationship/Rapport: care explained  Goal: Readiness for Transition of Care  Outcome: Ongoing, Progressing     Problem: Skin Injury Risk Increased  Goal: Skin Health and Integrity  Outcome: Ongoing, Progressing  Intervention: Optimize Skin Protection  Recent Flowsheet Documentation  Taken 1/12/2022 2300 by Aure Mejia RN  Pressure Reduction Techniques: frequent weight shift encouraged  Head of Bed (HOB): HOB at 45 degrees  Taken 1/12/2022 1930 by Aure Mejia RN  Pressure Reduction Techniques: frequent weight shift encouraged  Head of Bed (HOB): HOB at 30-45 degrees     Problem: Pain  Acute  Goal: Optimal Pain Control  Outcome: Ongoing, Progressing   Goal Outcome Evaluation:  Plan of Care Reviewed With: patient        Progress: no change  Outcome Summary: pt able to walk to bathroom with walker and min assist. medicated with tylenol. voicing relief with that.

## 2022-01-13 NOTE — PROGRESS NOTES
[facility] Medicine Services Daily Progress Note    Patient Name: Marjorie FLORENTINO  : 1965  MRN: [id number]  Primary Care Physician:  Lurdes West APRN  Date of admission: 2022      Subjective      Chief Complaint:  Neck and back pain      Patient reports neck and low back pain. She denies any other current complaints.       Review of Systems   Musculoskeletal: Positive for back pain and neck pain.   All other systems reviewed and are negative.         Objective      Vitals:   Temp:  [98 °F (36.7 °C)-98.4 °F (36.9 °C)] 98.2 °F (36.8 °C)  Heart Rate:  [77-87] 86  Resp:  [18] 18  BP: (105-145)/(60-85) 120/60    Physical Exam  Vitals reviewed.   Constitutional:       Appearance: She is obese.   HENT:      Head: Normocephalic.   Eyes:      Extraocular Movements: Extraocular movements intact.      Conjunctiva/sclera: Conjunctivae normal.      Pupils: Pupils are equal, round, and reactive to light.   Cardiovascular:      Rate and Rhythm: Normal rate and regular rhythm.      Pulses: Normal pulses.   Pulmonary:      Effort: Pulmonary effort is normal.      Breath sounds: Normal breath sounds.   Abdominal:      General: Bowel sounds are normal. There is no distension.      Palpations: Abdomen is soft.      Tenderness: There is no abdominal tenderness.   Musculoskeletal:      Cervical back: Normal range of motion.      Right lower leg: No edema.      Left lower leg: No edema.   Skin:     General: Skin is warm and dry.      Capillary Refill: Capillary refill takes less than 2 seconds.   Neurological:      Mental Status: She is alert and oriented to person, place, and time.   Psychiatric:         Mood and Affect: Mood normal.         Behavior: Behavior normal.           Result Review    Result Review:  I have personally reviewed the results from the time of this admission to 2022 10:19 EST and agree with these findings:  [x]  Laboratory  [x]  Microbiology  [x]  Radiology  []   EKG/Telemetry   []  Cardiology/Vascular   []  Pathology  [x]  Old records  []  Other:    Most notable findings include:   MRI c-spine:  1. Multilevel cervical spondylosis, as detailed above.  2. At C3-C4, there is moderate to severe spinal canal stenosis, mild indentation of the cervical cord, moderate to severe right neural foraminal narrowing, and moderate left neural foraminal narrowing.   3. At C4-C5, there is moderate to severe spinal canal stenosis and indentation of the cervical cord.  4. At C5-C6, there is moderate to severe right neural foraminal narrowing.  5. No abnormal enhancement.        Assessment/Plan      Brief Patient Summary:  Marjorie FLORENTINO is a 56 y.o. female with a history of HTN and depression who presented to the ER at [facility] on 01/11/2022 complaining of back pain, muscle spasms, urinary incontinence, and weakness. Workup in the ER was unremarkable. She was admitted to the ED Observation Unit.    01/12/22:  Neurosurgery was consulted and ordered MRI cervical spine to assess cervical myelopathy.    01/13/22:  The patient's MRI was abnormal and neurosurgery recommends surgical intervention. She was admitted to the Hospitalist group for further treatment.      lidocaine, 1 patch, Transdermal, Q24H  losartan, 25 mg, Oral, Daily  senna-docusate sodium, 2 tablet, Oral, BID  sertraline, 25 mg, Oral, Daily  sodium chloride, 10 mL, Intravenous, Q12H             Active Hospital Problems:  Active Hospital Problems    Diagnosis    • **Cervical myelopathy (HCC)    • Acute bilateral low back pain    • Urinary incontinence    • Obesity (BMI 30-39.9)    • Depression    • Primary hypertension      Plan:     Cervical myelopathy   -neurosurgery following and planning surgery on 01/14/22  -PRN analgesia   -Lidocaine patch  -PT following  -encourage IS    Acute bilateral low back pain with Urinary incontinence  -neurosurgery following  -PRN analgesia   -PT following     Primary hypertension,  chronic  -continue losartan  -hold HCTZ for surgery  -monitor BP    Depression  -continue Zoloft     Obesity  -BMI 30.13 on admission  -encourage lifestyle modifications         DVT prophylaxis:  Mechanical DVT prophylaxis orders are present.    CODE STATUS:    Level Of Support Discussed With: Patient  Code Status (Patient has no pulse and is not breathing): CPR (Attempt to Resuscitate)  Medical Interventions (Patient has pulse or is breathing): Full Support      Disposition:  I expect patient to be discharged 3-4 days.    This patient has been examined wearing appropriate Personal Protective Equipment. 01/13/22      Electronically signed by EZIO Modi, 01/13/22, 10:19 EST.  [facility] Hospitalist Team

## 2022-01-14 ENCOUNTER — ANESTHESIA (OUTPATIENT)
Dept: PERIOP | Facility: HOSPITAL | Age: 57
End: 2022-01-14

## 2022-01-14 ENCOUNTER — APPOINTMENT (OUTPATIENT)
Dept: GENERAL RADIOLOGY | Facility: HOSPITAL | Age: 57
End: 2022-01-14

## 2022-01-14 ENCOUNTER — ANESTHESIA EVENT (OUTPATIENT)
Dept: PERIOP | Facility: HOSPITAL | Age: 57
End: 2022-01-14

## 2022-01-14 LAB
ANION GAP SERPL CALCULATED.3IONS-SCNC: 9 MMOL/L (ref 5–15)
BUN SERPL-MCNC: 15 MG/DL (ref 6–20)
BUN/CREAT SERPL: 26.3 (ref 7–25)
CALCIUM SPEC-SCNC: 8.8 MG/DL (ref 8.6–10.5)
CHLORIDE SERPL-SCNC: 100 MMOL/L (ref 98–107)
CO2 SERPL-SCNC: 27 MMOL/L (ref 22–29)
CREAT SERPL-MCNC: 0.57 MG/DL (ref 0.57–1)
DEPRECATED RDW RBC AUTO: 41.6 FL (ref 37–54)
ERYTHROCYTE [DISTWIDTH] IN BLOOD BY AUTOMATED COUNT: 12.7 % (ref 12.3–15.4)
GFR SERPL CREATININE-BSD FRML MDRD: 110 ML/MIN/1.73
GLUCOSE SERPL-MCNC: 108 MG/DL (ref 65–99)
HCT VFR BLD AUTO: 32.7 % (ref 34–46.6)
HGB BLD-MCNC: 11.2 G/DL (ref 12–15.9)
MCH RBC QN AUTO: 31.6 PG (ref 26.6–33)
MCHC RBC AUTO-ENTMCNC: 34.1 G/DL (ref 31.5–35.7)
MCV RBC AUTO: 92.6 FL (ref 79–97)
PLATELET # BLD AUTO: 301 10*3/MM3 (ref 140–450)
PMV BLD AUTO: 7.1 FL (ref 6–12)
POTASSIUM SERPL-SCNC: 3.9 MMOL/L (ref 3.5–5.2)
RBC # BLD AUTO: 3.53 10*6/MM3 (ref 3.77–5.28)
SODIUM SERPL-SCNC: 136 MMOL/L (ref 136–145)
WBC NRBC COR # BLD: 12.7 10*3/MM3 (ref 3.4–10.8)

## 2022-01-14 PROCEDURE — 22551 ARTHRD ANT NTRBDY CERVICAL: CPT

## 2022-01-14 PROCEDURE — 25010000002 ONDANSETRON PER 1 MG: Performed by: ANESTHESIOLOGY

## 2022-01-14 PROCEDURE — 25010000002 SUCCINYLCHOLINE PER 20 MG: Performed by: ANESTHESIOLOGY

## 2022-01-14 PROCEDURE — 4A11X4G MONITORING OF PERIPHERAL NERVOUS ELECTRICAL ACTIVITY, INTRAOPERATIVE, EXTERNAL APPROACH: ICD-10-PCS | Performed by: NEUROLOGICAL SURGERY

## 2022-01-14 PROCEDURE — C1713 ANCHOR/SCREW BN/BN,TIS/BN: HCPCS | Performed by: NEUROLOGICAL SURGERY

## 2022-01-14 PROCEDURE — 0RG20A0 FUSION OF 2 OR MORE CERVICAL VERTEBRAL JOINTS WITH INTERBODY FUSION DEVICE, ANTERIOR APPROACH, ANTERIOR COLUMN, OPEN APPROACH: ICD-10-PCS | Performed by: NEUROLOGICAL SURGERY

## 2022-01-14 PROCEDURE — 85027 COMPLETE CBC AUTOMATED: CPT | Performed by: NURSE PRACTITIONER

## 2022-01-14 PROCEDURE — 25010000002 VANCOMYCIN 1 G RECONSTITUTED SOLUTION 1 EACH VIAL: Performed by: NEUROLOGICAL SURGERY

## 2022-01-14 PROCEDURE — 76000 FLUOROSCOPY <1 HR PHYS/QHP: CPT

## 2022-01-14 PROCEDURE — 25010000002 FENTANYL CITRATE (PF) 50 MCG/ML SOLUTION: Performed by: ANESTHESIOLOGY

## 2022-01-14 PROCEDURE — 25010000002 HYDROMORPHONE PER 4 MG

## 2022-01-14 PROCEDURE — 22845 INSERT SPINE FIXATION DEVICE: CPT | Performed by: NEUROLOGICAL SURGERY

## 2022-01-14 PROCEDURE — 22853 INSJ BIOMECHANICAL DEVICE: CPT | Performed by: NEUROLOGICAL SURGERY

## 2022-01-14 PROCEDURE — 22551 ARTHRD ANT NTRBDY CERVICAL: CPT | Performed by: NEUROLOGICAL SURGERY

## 2022-01-14 PROCEDURE — 22853 INSJ BIOMECHANICAL DEVICE: CPT

## 2022-01-14 PROCEDURE — 25010000002 DEXAMETHASONE PER 1 MG: Performed by: ANESTHESIOLOGY

## 2022-01-14 PROCEDURE — 80048 BASIC METABOLIC PNL TOTAL CA: CPT | Performed by: NURSE PRACTITIONER

## 2022-01-14 PROCEDURE — 22845 INSERT SPINE FIXATION DEVICE: CPT

## 2022-01-14 PROCEDURE — 22552 ARTHRD ANT NTRBD CERVICAL EA: CPT | Performed by: NEUROLOGICAL SURGERY

## 2022-01-14 PROCEDURE — 25010000002 PROPOFOL 200 MG/20ML EMULSION: Performed by: ANESTHESIOLOGY

## 2022-01-14 PROCEDURE — C1889 IMPLANT/INSERT DEVICE, NOC: HCPCS | Performed by: NEUROLOGICAL SURGERY

## 2022-01-14 PROCEDURE — 25010000002 PROPOFOL 10 MG/ML EMULSION: Performed by: ANESTHESIOLOGY

## 2022-01-14 PROCEDURE — 22552 ARTHRD ANT NTRBD CERVICAL EA: CPT

## 2022-01-14 PROCEDURE — 25010000002 MIDAZOLAM PER 1 MG: Performed by: ANESTHESIOLOGY

## 2022-01-14 PROCEDURE — 0RB30ZZ EXCISION OF CERVICAL VERTEBRAL DISC, OPEN APPROACH: ICD-10-PCS | Performed by: NEUROLOGICAL SURGERY

## 2022-01-14 PROCEDURE — 72040 X-RAY EXAM NECK SPINE 2-3 VW: CPT

## 2022-01-14 PROCEDURE — 25010000002 FENTANYL CITRATE (PF) 250 MCG/5ML SOLUTION: Performed by: ANESTHESIOLOGY

## 2022-01-14 PROCEDURE — 99232 SBSQ HOSP IP/OBS MODERATE 35: CPT | Performed by: HOSPITALIST

## 2022-01-14 DEVICE — I-FACTOR PUTTY, 2.5CC SYRINGE
Type: IMPLANTABLE DEVICE | Site: SPINE CERVICAL | Status: FUNCTIONAL
Brand: I-FACTOR PEPTIDE ENHANCED BONE GRAFT

## 2022-01-14 DEVICE — XTEND 4.2MM VARIABLE ANGLE SCREW, SELF-DRILLING, 12MM
Type: IMPLANTABLE DEVICE | Site: SPINE CERVICAL | Status: FUNCTIONAL
Brand: XTEND

## 2022-01-14 DEVICE — XTEND ANTERIOR CERVICAL PLATE, 2-LEVEL, 34MM
Type: IMPLANTABLE DEVICE | Site: SPINE CERVICAL | Status: FUNCTIONAL
Brand: XTEND

## 2022-01-14 DEVICE — FLOSEAL HEMOSTATIC MATRIX, 10ML
Type: IMPLANTABLE DEVICE | Site: SPINE CERVICAL | Status: FUNCTIONAL
Brand: FLOSEAL HEMOSTATIC MATRIX

## 2022-01-14 DEVICE — HEDRON C SPACER, 12X14, 7MM, 7°
Type: IMPLANTABLE DEVICE | Site: SPINE CERVICAL | Status: FUNCTIONAL
Brand: HEDRON

## 2022-01-14 DEVICE — HEDRON C SPACER, 12X14, 8MM, 7°
Type: IMPLANTABLE DEVICE | Site: SPINE CERVICAL | Status: FUNCTIONAL
Brand: HEDRON

## 2022-01-14 DEVICE — ALLOGRFT FIBR OSTEOAMP SELECT 1CC: Type: IMPLANTABLE DEVICE | Site: SPINE CERVICAL | Status: FUNCTIONAL

## 2022-01-14 RX ORDER — PHENYLEPHRINE HCL IN 0.9% NACL 1 MG/10 ML
SYRINGE (ML) INTRAVENOUS AS NEEDED
Status: DISCONTINUED | OUTPATIENT
Start: 2022-01-14 | End: 2022-01-14 | Stop reason: SURG

## 2022-01-14 RX ORDER — DEXAMETHASONE SODIUM PHOSPHATE 4 MG/ML
INJECTION, SOLUTION INTRA-ARTICULAR; INTRALESIONAL; INTRAMUSCULAR; INTRAVENOUS; SOFT TISSUE AS NEEDED
Status: DISCONTINUED | OUTPATIENT
Start: 2022-01-14 | End: 2022-01-14 | Stop reason: SURG

## 2022-01-14 RX ORDER — SUCCINYLCHOLINE CHLORIDE 20 MG/ML
INJECTION INTRAMUSCULAR; INTRAVENOUS AS NEEDED
Status: DISCONTINUED | OUTPATIENT
Start: 2022-01-14 | End: 2022-01-14 | Stop reason: SURG

## 2022-01-14 RX ORDER — FENTANYL CITRATE 50 UG/ML
INJECTION, SOLUTION INTRAMUSCULAR; INTRAVENOUS AS NEEDED
Status: DISCONTINUED | OUTPATIENT
Start: 2022-01-14 | End: 2022-01-14 | Stop reason: SURG

## 2022-01-14 RX ORDER — SODIUM CHLORIDE 0.9 % (FLUSH) 0.9 %
10 SYRINGE (ML) INJECTION EVERY 12 HOURS SCHEDULED
Status: DISCONTINUED | OUTPATIENT
Start: 2022-01-14 | End: 2022-01-21 | Stop reason: HOSPADM

## 2022-01-14 RX ORDER — MIDAZOLAM HYDROCHLORIDE 1 MG/ML
INJECTION INTRAMUSCULAR; INTRAVENOUS AS NEEDED
Status: DISCONTINUED | OUTPATIENT
Start: 2022-01-14 | End: 2022-01-14 | Stop reason: SURG

## 2022-01-14 RX ORDER — CHOLECALCIFEROL (VITAMIN D3) 125 MCG
5 CAPSULE ORAL NIGHTLY PRN
Status: DISCONTINUED | OUTPATIENT
Start: 2022-01-14 | End: 2022-01-21 | Stop reason: HOSPADM

## 2022-01-14 RX ORDER — PROPOFOL 10 MG/ML
INJECTION, EMULSION INTRAVENOUS AS NEEDED
Status: DISCONTINUED | OUTPATIENT
Start: 2022-01-14 | End: 2022-01-14 | Stop reason: SURG

## 2022-01-14 RX ORDER — HYDROMORPHONE HCL 110MG/55ML
0.25 PATIENT CONTROLLED ANALGESIA SYRINGE INTRAVENOUS
Status: DISCONTINUED | OUTPATIENT
Start: 2022-01-14 | End: 2022-01-14 | Stop reason: HOSPADM

## 2022-01-14 RX ORDER — BISACODYL 10 MG
10 SUPPOSITORY, RECTAL RECTAL DAILY PRN
Status: DISCONTINUED | OUTPATIENT
Start: 2022-01-14 | End: 2022-01-21

## 2022-01-14 RX ORDER — DOCUSATE SODIUM 100 MG/1
100 CAPSULE, LIQUID FILLED ORAL 2 TIMES DAILY PRN
Status: DISCONTINUED | OUTPATIENT
Start: 2022-01-14 | End: 2022-01-21 | Stop reason: HOSPADM

## 2022-01-14 RX ORDER — NALOXONE HCL 0.4 MG/ML
0.4 VIAL (ML) INJECTION
Status: DISCONTINUED | OUTPATIENT
Start: 2022-01-14 | End: 2022-01-21 | Stop reason: HOSPADM

## 2022-01-14 RX ORDER — LIDOCAINE HYDROCHLORIDE AND EPINEPHRINE 10; 10 MG/ML; UG/ML
INJECTION, SOLUTION INFILTRATION; PERINEURAL AS NEEDED
Status: DISCONTINUED | OUTPATIENT
Start: 2022-01-14 | End: 2022-01-14 | Stop reason: HOSPADM

## 2022-01-14 RX ORDER — CYCLOBENZAPRINE HCL 10 MG
10 TABLET ORAL 3 TIMES DAILY PRN
Status: DISCONTINUED | OUTPATIENT
Start: 2022-01-14 | End: 2022-01-21 | Stop reason: HOSPADM

## 2022-01-14 RX ORDER — REMIFENTANIL HYDROCHLORIDE 1 MG/ML
INJECTION, POWDER, LYOPHILIZED, FOR SOLUTION INTRAVENOUS CONTINUOUS PRN
Status: DISCONTINUED | OUTPATIENT
Start: 2022-01-14 | End: 2022-01-14 | Stop reason: SURG

## 2022-01-14 RX ORDER — SODIUM CHLORIDE, SODIUM LACTATE, POTASSIUM CHLORIDE, CALCIUM CHLORIDE 600; 310; 30; 20 MG/100ML; MG/100ML; MG/100ML; MG/100ML
INJECTION, SOLUTION INTRAVENOUS CONTINUOUS PRN
Status: DISCONTINUED | OUTPATIENT
Start: 2022-01-14 | End: 2022-01-14 | Stop reason: SURG

## 2022-01-14 RX ORDER — FENTANYL CITRATE 50 UG/ML
50 INJECTION, SOLUTION INTRAMUSCULAR; INTRAVENOUS
Status: DISCONTINUED | OUTPATIENT
Start: 2022-01-14 | End: 2022-01-14 | Stop reason: HOSPADM

## 2022-01-14 RX ORDER — ONDANSETRON 2 MG/ML
4 INJECTION INTRAMUSCULAR; INTRAVENOUS ONCE AS NEEDED
Status: DISCONTINUED | OUTPATIENT
Start: 2022-01-14 | End: 2022-01-14 | Stop reason: HOSPADM

## 2022-01-14 RX ORDER — HYDROCODONE BITARTRATE AND ACETAMINOPHEN 5; 325 MG/1; MG/1
1 TABLET ORAL EVERY 4 HOURS PRN
Status: DISPENSED | OUTPATIENT
Start: 2022-01-14 | End: 2022-01-21

## 2022-01-14 RX ORDER — ONDANSETRON 2 MG/ML
INJECTION INTRAMUSCULAR; INTRAVENOUS AS NEEDED
Status: DISCONTINUED | OUTPATIENT
Start: 2022-01-14 | End: 2022-01-14 | Stop reason: SURG

## 2022-01-14 RX ORDER — ACETAMINOPHEN 325 MG/1
650 TABLET ORAL EVERY 4 HOURS PRN
Status: DISCONTINUED | OUTPATIENT
Start: 2022-01-14 | End: 2022-01-21 | Stop reason: HOSPADM

## 2022-01-14 RX ORDER — LIDOCAINE 50 MG/G
1 PATCH TOPICAL
Status: DISCONTINUED | OUTPATIENT
Start: 2022-01-14 | End: 2022-01-21 | Stop reason: HOSPADM

## 2022-01-14 RX ORDER — EPHEDRINE SULFATE 5 MG/ML
INJECTION INTRAVENOUS AS NEEDED
Status: DISCONTINUED | OUTPATIENT
Start: 2022-01-14 | End: 2022-01-14 | Stop reason: SURG

## 2022-01-14 RX ORDER — SODIUM CHLORIDE 0.9 % (FLUSH) 0.9 %
10 SYRINGE (ML) INJECTION AS NEEDED
Status: DISCONTINUED | OUTPATIENT
Start: 2022-01-14 | End: 2022-01-21 | Stop reason: HOSPADM

## 2022-01-14 RX ORDER — HYDROMORPHONE HCL 110MG/55ML
0.5 PATIENT CONTROLLED ANALGESIA SYRINGE INTRAVENOUS
Status: DISPENSED | OUTPATIENT
Start: 2022-01-14 | End: 2022-01-21

## 2022-01-14 RX ADMIN — FENTANYL CITRATE 100 MCG: 0.05 INJECTION, SOLUTION INTRAMUSCULAR; INTRAVENOUS at 07:49

## 2022-01-14 RX ADMIN — Medication 100 MCG: at 08:34

## 2022-01-14 RX ADMIN — SUCCINYLCHOLINE CHLORIDE 120 MG: 20 INJECTION INTRAMUSCULAR; INTRAVENOUS at 07:49

## 2022-01-14 RX ADMIN — FENTANYL CITRATE 50 MCG: 50 INJECTION INTRAMUSCULAR; INTRAVENOUS at 15:03

## 2022-01-14 RX ADMIN — EPHEDRINE SULFATE 10 MG: 5 INJECTION INTRAVENOUS at 08:32

## 2022-01-14 RX ADMIN — Medication 100 MCG: at 11:34

## 2022-01-14 RX ADMIN — CEFAZOLIN SODIUM 2 G: 1 INJECTION, POWDER, FOR SOLUTION INTRAMUSCULAR; INTRAVENOUS at 08:13

## 2022-01-14 RX ADMIN — FENTANYL CITRATE 25 MCG: 50 INJECTION INTRAMUSCULAR; INTRAVENOUS at 13:56

## 2022-01-14 RX ADMIN — EPHEDRINE SULFATE 5 MG: 5 INJECTION INTRAVENOUS at 10:46

## 2022-01-14 RX ADMIN — DEXAMETHASONE SODIUM PHOSPHATE 8 MG: 4 INJECTION, SOLUTION INTRAMUSCULAR; INTRAVENOUS at 12:39

## 2022-01-14 RX ADMIN — HYDROMORPHONE HYDROCHLORIDE 0.5 MG: 2 INJECTION, SOLUTION INTRAMUSCULAR; INTRAVENOUS; SUBCUTANEOUS at 22:05

## 2022-01-14 RX ADMIN — Medication 100 MCG: at 08:31

## 2022-01-14 RX ADMIN — CEFAZOLIN SODIUM 2 G: 1 INJECTION, POWDER, FOR SOLUTION INTRAMUSCULAR; INTRAVENOUS at 12:22

## 2022-01-14 RX ADMIN — EPHEDRINE SULFATE 10 MG: 5 INJECTION INTRAVENOUS at 08:55

## 2022-01-14 RX ADMIN — FENTANYL CITRATE 50 MCG: 0.05 INJECTION, SOLUTION INTRAMUSCULAR; INTRAVENOUS at 13:04

## 2022-01-14 RX ADMIN — SODIUM CHLORIDE, PRESERVATIVE FREE 10 ML: 5 INJECTION INTRAVENOUS at 22:07

## 2022-01-14 RX ADMIN — SODIUM CHLORIDE, PRESERVATIVE FREE 10 ML: 5 INJECTION INTRAVENOUS at 22:08

## 2022-01-14 RX ADMIN — MIDAZOLAM 2 MG: 1 INJECTION INTRAMUSCULAR; INTRAVENOUS at 07:41

## 2022-01-14 RX ADMIN — Medication 100 MCG: at 10:28

## 2022-01-14 RX ADMIN — SODIUM CHLORIDE, SODIUM LACTATE, POTASSIUM CHLORIDE, AND CALCIUM CHLORIDE: .6; .31; .03; .02 INJECTION, SOLUTION INTRAVENOUS at 07:37

## 2022-01-14 RX ADMIN — FENTANYL CITRATE 100 MCG: 0.05 INJECTION, SOLUTION INTRAMUSCULAR; INTRAVENOUS at 12:45

## 2022-01-14 RX ADMIN — EPHEDRINE SULFATE 10 MG: 5 INJECTION INTRAVENOUS at 08:27

## 2022-01-14 RX ADMIN — PROPOFOL 75 MCG/KG/MIN: 10 INJECTION, EMULSION INTRAVENOUS at 07:52

## 2022-01-14 RX ADMIN — LIDOCAINE 1 PATCH: 50 PATCH TOPICAL at 22:05

## 2022-01-14 RX ADMIN — ONDANSETRON 4 MG: 2 INJECTION INTRAMUSCULAR; INTRAVENOUS at 12:57

## 2022-01-14 RX ADMIN — PROPOFOL 200 MG: 10 INJECTION, EMULSION INTRAVENOUS at 07:49

## 2022-01-14 RX ADMIN — REMIFENTANIL HYDROCHLORIDE 0.12 MCG/KG/MIN: 1 INJECTION, POWDER, LYOPHILIZED, FOR SOLUTION INTRAVENOUS at 08:17

## 2022-01-14 NOTE — PROGRESS NOTES
Neurosurgical progress note:    Subjective:  Patient was seen and examined in the emergency department this evening personally.  She is doing well.  Her myelopathy remained stable although as noted in previous documentation it is severe.  I answered her questions about the planned two-level anterior cervical discectomy and fusion.  She understands to not eat or drink anything after midnight tonight.    Subjective:  GCS: 465 without any cognitive deficits.  Moves everything with good overall strength.  She has significant coordination and proprioceptive difficulties.  She has very brisk bilateral Мария's response.  She is hyperreflexive globally.  She does appear to have mild clonus.  I deferred ambulation evaluation.  Her modified [employer] myelopathy score appears to be 10 which makes her severe.    Her imaging is suggestive of cervical depression at C3-C4 and C4-C5.  This is severe at C4-C5.  In order to decompress her spinal canal we recommend a two-level ACDF.    Assessment/plan:  The patient will go to the operating room tomorrow for two level cervical disc removal/fusion.  N.p.o. at midnight.  Hold DVT prophylaxis.

## 2022-01-14 NOTE — CASE MANAGEMENT/SOCIAL WORK
Continued Stay Note  [facility]     Patient Name: Marjorie FLORENTINO  MRN: [id number]  Today's Date: 1/14/2022    Admit Date: 1/11/2022     Discharge Plan     Row Name 01/14/22 1325       Plan    Plan In surgery 1/14    Plan Comments Patient in surgery                          Lidia Saenz RN   Phone communication or documentation only - no physical contact with patient or family.

## 2022-01-14 NOTE — PROGRESS NOTES
Neurosurgical progress note:    Subjective: Patient underwent a C3-C5 anterior cervical discectomy and fusion today.  The case went well without any intraoperative complications.  She underwent neuro monitoring throughout the entirety of the case and there were no issues on this front.  Postoperatively she does notice improvement in her sensation in her feet as well as motor function of her hands.  She does have some postoperative swallowing difficulties including some difficulty managing her own secretions.  I have let her know that this is common.  She also does have some speech hoarseness which is also common.    Objective:  Cognitively fully intact.  Moving everything spontaneously.  Swallowing difficulty evident on examination.  Does appear to have improved dexterity with her hands.  Incision is clean dry and dressed.    Assessment/plan:  This is a 56-year-old woman with progressive severe myelopathy secondary to spinal canal stenosis.  She underwent a two-level anterior cervical discectomy and fusion today to address the spinal cord compression.  She is already showing some improvement.  She does have some swallowing difficulty.  Her incision is approximated and clean dry and dressed.    - Recommend the patient remain at 90 degrees throughout the night.  - If she has significant difficulty with swallowing extending into tomorrow my partner can consider a dose of steroids.  - X-rays have been ordered and will be reviewed.  - Recommend aggressive physical and occupational therapy  - Okay for chemical DVT prophylaxis starting tomorrow morning  - Advance diet as tolerated  - Opioids and muscle relaxers for pain  - Call with any questions or concerns.

## 2022-01-14 NOTE — ANESTHESIA PREPROCEDURE EVALUATION
Anesthesia Evaluation     Patient summary reviewed and Nursing notes reviewed   NPO Solid Status: > 8 hours  NPO Liquid Status: > 8 hours           Airway   Mallampati: II  TM distance: >3 FB  Neck ROM: full  No difficulty expected  Dental - normal exam     Pulmonary    Cardiovascular     (+) hypertension,       Neuro/Psych  (+) psychiatric history Depression,     GI/Hepatic/Renal/Endo    (+) obesity,       Musculoskeletal     Abdominal    Substance History      OB/GYN          Other                        Anesthesia Plan    ASA 2     general   total IV anesthesia  intravenous induction     Anesthetic plan, all risks, benefits, and alternatives have been provided, discussed and informed consent has been obtained with: patient.

## 2022-01-14 NOTE — NURSING NOTE
"Patient having difficulty managing oral secretions. Crying because she says \" I cant swallow.\"   Oral suction performed. All vitals remain stable. Patient on minimal oxygen via nasal canula.   Call placed to Dr. Meyers cell. Dr. Meyers notified of vitals and minimal oxygen requirement. Findings considered normal for her procedure. Keep patient sitting up 90 degrees.   "

## 2022-01-14 NOTE — PROGRESS NOTES
[facility] Medicine Services Daily Progress Note    Patient Name: Marjorie FLORENTINO  : 1965  MRN: [id number]  Primary Care Physician:  Lurdes West APRN  Date of admission: 2022      Subjective      Chief Complaint: Back pain    HPI:    Marjorie FLORENTINO is a 56 y.o. female with a history of HTN and depression who presented to the ER at [facility] on 2022 complaining of back pain, muscle spasms, urinary incontinence, and weakness. Workup in the ER was unremarkable. She was admitted to the ED Observation Unit.     22:  Neurosurgery was consulted and ordered MRI cervical spine to assess cervical myelopathy.     22:  The patient's MRI was abnormal and neurosurgery recommends surgical intervention. She was admitted to the Hospitalist group for further treatment.    22: Patient reports adequate pain control and denies specific complaints other than some difficulty swallowing and clearing her secretions and she has a suction that she is using that helps.  Neurosurgery-May start chemical DVT prophylaxis tomorrow.    ROS   12 point review of systems was reviewed and was negative except some pain from her surgery and difficulty with swallowing.    Objective      Vitals:   Temp:  [97.4 °F (36.3 °C)-98 °F (36.7 °C)] 97.6 °F (36.4 °C)  Heart Rate:  [] 94  Resp:  [11-21] 18  BP: (106-159)/(59-92) 159/92  Flow (L/min):  [2-3] 2    Physical Exam   Vital signs and nurses notes reviewed.   Well-developed over-nourished female sitting up in the bed in no acute distress awake and alert; mucous membranes moist; sclerae anicteric; some swelling in the anterior neck with surgical dressing in place not removed; lungs clear to auscultation bilaterally; CV regular rate and rhythm; abdomen soft nontender nondistended; extremities with no edema, cyanosis or calf tenderness; palpable pedal pulses bilaterally; no Lepe catheter.    Result Review    Result Review:  I have  personally reviewed the results from the time of this admission to 1/14/2022 18:39 EST and agree with these findings:  [x]  Laboratory  [x]  Microbiology  [x]  Radiology  [x]  EKG/Telemetry   [x]  Cardiology/Vascular   []  Pathology  []  Old records  []  Other:      Wounds (last 24 hours)     LDA Wound     Row Name 01/14/22 1630 01/14/22 1542 01/14/22 1539       Wound 01/14/22 0847 Right neck Incision    Wound - Properties Group Placement Date: 01/14/22  -LT Placement Time: 0847  -LT Side: Right  -LT Location: neck  -LT Primary Wound Type: Incision  -LT    Dressing Appearance dry; intact  -SM -- --    Closure BILLY  -SM BILLY  -CL BILLY  -CL    Retired Wound - Properties Group Date first assessed: 01/14/22  -LT Time first assessed: 0847  -LT Side: Right  -LT Location: neck  -LT Primary Wound Type: Incision  -LT    Row Name 01/14/22 1522 01/14/22 1510 01/14/22 1500       Wound 01/14/22 0847 Right neck Incision    Wound - Properties Group Placement Date: 01/14/22  -LT Placement Time: 0847  -LT Side: Right  -LT Location: neck  -LT Primary Wound Type: Incision  -LT    Dressing Appearance dry; intact; no drainage  -CL -- --    Closure BILLY  -CL BILLY  -CL BILLY  -CL    Retired Wound - Properties Group Date first assessed: 01/14/22  -LT Time first assessed: 0847  -LT Side: Right  -LT Location: neck  -LT Primary Wound Type: Incision  -LT    Row Name 01/14/22 1453 01/14/22 1446 01/14/22 1418       Wound 01/14/22 0847 Right neck Incision    Wound - Properties Group Placement Date: 01/14/22  -LT Placement Time: 0847  -LT Side: Right  -LT Location: neck  -LT Primary Wound Type: Incision  -LT    Closure BILLY  -CL BILLY  -CL BILLY  -CL    Retired Wound - Properties Group Date first assessed: 01/14/22  -LT Time first assessed: 0847  -LT Side: Right  -LT Location: neck  -LT Primary Wound Type: Incision  -LT    Row Name 01/14/22 1403 01/14/22 1348 01/14/22 1333       Wound 01/14/22 0847 Right neck Incision    Wound - Properties Group Placement  "Date: 01/14/22  -LT Placement Time: 0847  -LT Side: Right  -LT Location: neck  -LT Primary Wound Type: Incision  -LT    Dressing Appearance dry; intact; no drainage  -CL dry; intact; no drainage  -CL dry; intact; no drainage  -CL    Closure BILLY  -CL BILLY  -CL BILLY  -CL    Retired Wound - Properties Group Date first assessed: 01/14/22  -LT Time first assessed: 0847  -LT Side: Right  -LT Location: neck  -LT Primary Wound Type: Incision  -LT    Row Name 01/14/22 1318 01/14/22 1307          Wound 01/14/22 0847 Right neck Incision    Wound - Properties Group Placement Date: 01/14/22  -LT Placement Time: 0847  -LT Side: Right  -LT Location: neck  -LT Primary Wound Type: Incision  -LT     Dressing Appearance dry; intact; no drainage  -CL --     Closure BILLY  -CL Liquid skin adhesive; Adhesive closure strips  exofin, covaderm  -LT     Retired Wound - Properties Group Date first assessed: 01/14/22  -LT Time first assessed: 0847  -LT Side: Right  -LT Location: neck  -LT Primary Wound Type: Incision  -LT           User Key  (r) = Recorded By, (t) = Taken By, (c) = Cosigned By    Initials Name Provider Type    LT Elizabeht Clancy, RN Registered Nurse    Farnaz Man RN Registered Nurse    Jazmyne Hernandez, RN Registered Nurse                  Assessment/Plan      Brief Patient Summary:  Marjorie FLORENTINO is a 56 y.o. female who presents emergency department with complaint of back pain, and \"back seizing\" that has gradually worsened since she had a fall in October 2021.  Patient reports she has had weakness in her hands, and she urinates when she stands up.  She complains of muscle spasms, and generally feeling weak.  Patient has been seen by her primary care provider, has had MRI of the brain, cervical spine and lumbar spine since her symptoms began. She denies any other bowel or bladder symptoms.           [START ON 1/15/2022] enoxaparin, 40 mg, Subcutaneous, Daily  lidocaine, 1 patch, Transdermal, Q24H  losartan, 25 mg, Oral, " Daily  senna-docusate sodium, 2 tablet, Oral, BID  sertraline, 25 mg, Oral, Daily  sodium chloride, 10 mL, Intravenous, Q12H  sodium chloride, 10 mL, Intravenous, Q12H             Active Hospital Problems:  Active Hospital Problems    Diagnosis    • **Cervical myelopathy (HCC)    • Acute bilateral low back pain    • Urinary incontinence    • Obesity (BMI 30-39.9)    • Depression    • Intervertebral cervical disc disorder with myelopathy, cervical region      Added automatically from request for surgery [id number]     • Primary hypertension      Plan:   Acute bilateral low back pain with Urinary incontinence  Cervical myelopathy   -Status post anterior cervical discectomy and fusion 1/14/2022  -Continue pain medication  -PT following  -encourage IS  -Neurovascular checks  -Neurosurgery following-May start chemical DVT prophylaxis tomorrow     Primary hypertension, chronic  -continue losartan  -hold HCTZ for surgery  -monitor BP     Depression  -continue Zoloft      Obesity  -BMI 30.13 on admission  -encourage lifestyle modifications        DVT prophylaxis:  Medical and mechanical DVT prophylaxis orders are present.    CODE STATUS:    Level Of Support Discussed With: Patient  Code Status (Patient has no pulse and is not breathing): CPR (Attempt to Resuscitate)  Medical Interventions (Patient has pulse or is breathing): Full Support      Disposition:  I expect patient to be discharged in 2 to 3 days according to what the neurosurgeon told her.    This patient has been examined wearing appropriate Personal Protective Equipment and discussed with hospital infection control department. 01/14/22      Electronically signed by EZIO Alexandra, 01/14/22, 18:39 EST.  [facility] Hospitalist Team

## 2022-01-14 NOTE — ANESTHESIA POSTPROCEDURE EVALUATION
Patient: Marjorie FLORENTINO    Procedure Summary     Date: 01/14/22 Room / Location: [facility] OR 11 / [facility] MAIN OR    Anesthesia Start: 0738 Anesthesia Stop: 1319    Procedure: CERVICAL DISCECTOMY ANTERIOR WITH FUSION C3-C5 (Bilateral Spine Cervical) Diagnosis:       Intervertebral cervical disc disorder with myelopathy, cervical region      (Intervertebral cervical disc disorder with myelopathy, cervical region [M50.00])    Surgeons: Max Meyers MD Provider: Eber Francis MD    Anesthesia Type: general ASA Status: 2          Anesthesia Type: general    Vitals  Vitals Value Taken Time   /63 01/14/22 1326   Temp 97.8 °F (36.6 °C) 01/14/22 1318   Pulse 100 01/14/22 1326   Resp 12 01/14/22 1323   SpO2 97 % 01/14/22 1326   Vitals shown include unvalidated device data.        Post Anesthesia Care and Evaluation    Patient location during evaluation: PACU  Patient participation: complete - patient participated  Level of consciousness: awake  Pain scale: See nurse's notes for pain score.  Pain management: adequate  Airway patency: patent  Anesthetic complications: No anesthetic complications  PONV Status: none  Cardiovascular status: acceptable  Respiratory status: acceptable  Hydration status: acceptable    Comments: Patient seen and examined postoperatively; vital signs stable; SpO2 greater than or equal to 90%; cardiopulmonary status stable; nausea/vomiting adequately controlled; pain adequately controlled; no apparent anesthesia complications; patient discharged from anesthesia care when discharge criteria were met

## 2022-01-14 NOTE — PLAN OF CARE
Problem: Adult Inpatient Plan of Care  Goal: Plan of Care Review  Outcome: Ongoing, Progressing  Goal: Patient-Specific Goal (Individualized)  Outcome: Ongoing, Progressing  Goal: Absence of Hospital-Acquired Illness or Injury  Outcome: Ongoing, Progressing  Intervention: Identify and Manage Fall Risk  Recent Flowsheet Documentation  Taken 1/14/2022 0300 by Karmen Tucker RN  Safety Promotion/Fall Prevention: safety round/check completed  Taken 1/14/2022 0100 by Karmen Tucker RN  Safety Promotion/Fall Prevention: safety round/check completed  Taken 1/13/2022 2300 by Karmen Tucker RN  Safety Promotion/Fall Prevention: safety round/check completed  Taken 1/13/2022 2100 by Karmen Tucker RN  Safety Promotion/Fall Prevention: safety round/check completed  Taken 1/13/2022 1900 by Karmen Tucker RN  Safety Promotion/Fall Prevention:   safety round/check completed   nonskid shoes/slippers when out of bed   clutter free environment maintained   assistive device/personal items within reach  Intervention: Prevent Skin Injury  Recent Flowsheet Documentation  Taken 1/13/2022 1900 by Karmen Tucker RN  Skin Protection: adhesive use limited  Goal: Optimal Comfort and Wellbeing  Outcome: Ongoing, Progressing  Intervention: Provide Person-Centered Care  Recent Flowsheet Documentation  Taken 1/13/2022 1900 by Karmen Tucker RN  Trust Relationship/Rapport:   care explained   questions encouraged   questions answered   thoughts/feelings acknowledged  Goal: Readiness for Transition of Care  Outcome: Ongoing, Progressing     Problem: Skin Injury Risk Increased  Goal: Skin Health and Integrity  Outcome: Ongoing, Progressing  Intervention: Optimize Skin Protection  Recent Flowsheet Documentation  Taken 1/13/2022 1900 by Karmen Tucker RN  Pressure Reduction Techniques: frequent weight shift encouraged  Skin Protection: adhesive use limited     Problem: Pain Acute  Goal: Optimal Pain Control  Outcome: Ongoing, Progressing  Intervention:  Develop Pain Management Plan  Recent Flowsheet Documentation  Taken 1/13/2022 2042 by Karmen Tucker RN  Pain Management Interventions: see MAR  Taken 1/13/2022 1900 by Karmen Tucker RN  Pain Management Interventions: no interventions per patient request  Intervention: Optimize Psychosocial Wellbeing  Recent Flowsheet Documentation  Taken 1/13/2022 1900 by Karmen Tucker RN  Diversional Activities: television   Goal Outcome Evaluation:        Patient resting comfortably in bed at this time.  She is scheduled to have a two level cervical disc removal/fusion later today.  Pain tolerable with tylenol. Vitals stable, on room air, and safety precautions in use.  Will continue to monitor.

## 2022-01-14 NOTE — ANESTHESIA PROCEDURE NOTES
Airway  Urgency: elective    Date/Time: 1/14/2022 7:51 AM  Airway not difficult    General Information and Staff    Patient location during procedure: OR    Indications and Patient Condition  Indications for airway management: airway protection    Preoxygenated: yes  Mask difficulty assessment: 0 - not attempted    Final Airway Details  Final airway type: endotracheal airway      Successful airway: ETT  Cuffed: yes   Successful intubation technique: video laryngoscopy  Facilitating devices/methods: intubating stylet  Endotracheal tube insertion site: oral  Blade: Glidescope  Blade size: 3  ETT size (mm): 7.0  Cormack-Lehane Classification: grade I - full view of glottis  Placement verified by: capnometry   Cuff volume (mL): 8  Measured from: lips  ETT/EBT  to lips (cm): 20  Number of attempts at approach: 1  Assessment: lips, teeth, and gum same as pre-op and atraumatic intubation    Additional Comments  Soft bite block

## 2022-01-15 LAB
ANION GAP SERPL CALCULATED.3IONS-SCNC: 10 MMOL/L (ref 5–15)
BASOPHILS # BLD AUTO: 0 10*3/MM3 (ref 0–0.2)
BASOPHILS NFR BLD AUTO: 0.4 % (ref 0–1.5)
BUN SERPL-MCNC: 12 MG/DL (ref 6–20)
BUN/CREAT SERPL: 19 (ref 7–25)
CALCIUM SPEC-SCNC: 8.9 MG/DL (ref 8.6–10.5)
CHLORIDE SERPL-SCNC: 103 MMOL/L (ref 98–107)
CO2 SERPL-SCNC: 28 MMOL/L (ref 22–29)
CREAT SERPL-MCNC: 0.63 MG/DL (ref 0.57–1)
DEPRECATED RDW RBC AUTO: 41.6 FL (ref 37–54)
EOSINOPHIL # BLD AUTO: 0 10*3/MM3 (ref 0–0.4)
EOSINOPHIL NFR BLD AUTO: 0.2 % (ref 0.3–6.2)
ERYTHROCYTE [DISTWIDTH] IN BLOOD BY AUTOMATED COUNT: 12.7 % (ref 12.3–15.4)
GFR SERPL CREATININE-BSD FRML MDRD: 98 ML/MIN/1.73
GLUCOSE SERPL-MCNC: 111 MG/DL (ref 65–99)
HCT VFR BLD AUTO: 32.4 % (ref 34–46.6)
HGB BLD-MCNC: 11.3 G/DL (ref 12–15.9)
LYMPHOCYTES # BLD AUTO: 1.3 10*3/MM3 (ref 0.7–3.1)
LYMPHOCYTES NFR BLD AUTO: 13.6 % (ref 19.6–45.3)
MCH RBC QN AUTO: 32.3 PG (ref 26.6–33)
MCHC RBC AUTO-ENTMCNC: 34.7 G/DL (ref 31.5–35.7)
MCV RBC AUTO: 93.1 FL (ref 79–97)
MONOCYTES # BLD AUTO: 0.7 10*3/MM3 (ref 0.1–0.9)
MONOCYTES NFR BLD AUTO: 7.6 % (ref 5–12)
NEUTROPHILS NFR BLD AUTO: 7.6 10*3/MM3 (ref 1.7–7)
NEUTROPHILS NFR BLD AUTO: 78.2 % (ref 42.7–76)
NRBC BLD AUTO-RTO: 0 /100 WBC (ref 0–0.2)
PLATELET # BLD AUTO: 308 10*3/MM3 (ref 140–450)
PMV BLD AUTO: 7.2 FL (ref 6–12)
POTASSIUM SERPL-SCNC: 4.2 MMOL/L (ref 3.5–5.2)
RBC # BLD AUTO: 3.49 10*6/MM3 (ref 3.77–5.28)
SODIUM SERPL-SCNC: 141 MMOL/L (ref 136–145)
WBC NRBC COR # BLD: 9.7 10*3/MM3 (ref 3.4–10.8)

## 2022-01-15 PROCEDURE — 25010000002 DEXAMETHASONE PER 1 MG: Performed by: NURSE PRACTITIONER

## 2022-01-15 PROCEDURE — 85025 COMPLETE CBC W/AUTO DIFF WBC: CPT

## 2022-01-15 PROCEDURE — 99024 POSTOP FOLLOW-UP VISIT: CPT | Performed by: NURSE PRACTITIONER

## 2022-01-15 PROCEDURE — 99232 SBSQ HOSP IP/OBS MODERATE 35: CPT | Performed by: INTERNAL MEDICINE

## 2022-01-15 PROCEDURE — 80048 BASIC METABOLIC PNL TOTAL CA: CPT

## 2022-01-15 PROCEDURE — 97161 PT EVAL LOW COMPLEX 20 MIN: CPT

## 2022-01-15 PROCEDURE — 25010000002 ENOXAPARIN PER 10 MG

## 2022-01-15 PROCEDURE — 25010000002 HYDROMORPHONE PER 4 MG

## 2022-01-15 RX ORDER — DEXAMETHASONE SODIUM PHOSPHATE 4 MG/ML
4 INJECTION, SOLUTION INTRA-ARTICULAR; INTRALESIONAL; INTRAMUSCULAR; INTRAVENOUS; SOFT TISSUE EVERY 6 HOURS
Status: COMPLETED | OUTPATIENT
Start: 2022-01-15 | End: 2022-01-16

## 2022-01-15 RX ADMIN — DEXAMETHASONE SODIUM PHOSPHATE 4 MG: 4 INJECTION, SOLUTION INTRA-ARTICULAR; INTRALESIONAL; INTRAMUSCULAR; INTRAVENOUS; SOFT TISSUE at 09:39

## 2022-01-15 RX ADMIN — HYDROMORPHONE HYDROCHLORIDE 0.5 MG: 2 INJECTION, SOLUTION INTRAMUSCULAR; INTRAVENOUS; SUBCUTANEOUS at 01:06

## 2022-01-15 RX ADMIN — HYDROMORPHONE HYDROCHLORIDE 0.5 MG: 2 INJECTION, SOLUTION INTRAMUSCULAR; INTRAVENOUS; SUBCUTANEOUS at 05:22

## 2022-01-15 RX ADMIN — SERTRALINE 25 MG: 25 TABLET, FILM COATED ORAL at 09:39

## 2022-01-15 RX ADMIN — DEXAMETHASONE SODIUM PHOSPHATE 4 MG: 4 INJECTION, SOLUTION INTRA-ARTICULAR; INTRALESIONAL; INTRAMUSCULAR; INTRAVENOUS; SOFT TISSUE at 22:10

## 2022-01-15 RX ADMIN — HYDROMORPHONE HYDROCHLORIDE 0.5 MG: 2 INJECTION, SOLUTION INTRAMUSCULAR; INTRAVENOUS; SUBCUTANEOUS at 22:10

## 2022-01-15 RX ADMIN — HYDROMORPHONE HYDROCHLORIDE 0.5 MG: 2 INJECTION, SOLUTION INTRAMUSCULAR; INTRAVENOUS; SUBCUTANEOUS at 03:02

## 2022-01-15 RX ADMIN — SENNOSIDES AND DOCUSATE SODIUM 2 TABLET: 8.6; 5 TABLET ORAL at 09:39

## 2022-01-15 RX ADMIN — DEXAMETHASONE SODIUM PHOSPHATE 4 MG: 4 INJECTION, SOLUTION INTRA-ARTICULAR; INTRALESIONAL; INTRAMUSCULAR; INTRAVENOUS; SOFT TISSUE at 15:57

## 2022-01-15 RX ADMIN — SODIUM CHLORIDE, PRESERVATIVE FREE 10 ML: 5 INJECTION INTRAVENOUS at 22:14

## 2022-01-15 RX ADMIN — LOSARTAN POTASSIUM 25 MG: 25 TABLET, FILM COATED ORAL at 09:39

## 2022-01-15 RX ADMIN — HYDROMORPHONE HYDROCHLORIDE 0.5 MG: 2 INJECTION, SOLUTION INTRAMUSCULAR; INTRAVENOUS; SUBCUTANEOUS at 17:08

## 2022-01-15 RX ADMIN — ENOXAPARIN SODIUM 40 MG: 40 INJECTION SUBCUTANEOUS at 15:57

## 2022-01-15 RX ADMIN — SODIUM CHLORIDE, PRESERVATIVE FREE 10 ML: 5 INJECTION INTRAVENOUS at 09:40

## 2022-01-15 RX ADMIN — LIDOCAINE 1 PATCH: 50 PATCH TOPICAL at 22:10

## 2022-01-15 NOTE — PLAN OF CARE
Pt rested very little during shift. Frequently seeks out the attention of staff; request pain medication often. Pt intermittently tearful, inconsolable. Pt reports difficulty swallowing, refusing to swallow pills. Pt ambulates to bathroom with use of walker; reports weakness. Call light within reach; pt able to make needs known. Will continue to monitor.

## 2022-01-15 NOTE — PROGRESS NOTES
[facility] Medicine Services Daily Progress Note    Patient Name: Marjorie FLORENTINO  : 1965  MRN: [id number]  Primary Care Physician:  Lurdes West APRN  Date of admission: 2022      Subjective      Chief Complaint: Back pain    HPI:    Marjorie FLORENTINO is a 56 y.o. female with a history of HTN and depression who presented to the ER at [facility] on 2022 complaining of back pain, muscle spasms, urinary incontinence, and weakness. Workup in the ER was unremarkable. She was admitted to the ED Observation Unit.     22:  Neurosurgery was consulted and ordered MRI cervical spine to assess cervical myelopathy.     22:  The patient's MRI was abnormal and neurosurgery recommends surgical intervention. She was admitted to the Hospitalist group for further treatment.    22: Patient reports adequate pain control and denies specific complaints other than some difficulty swallowing and clearing her secretions and she has a suction that she is using that helps.  Neurosurgery-May start chemical DVT prophylaxis tomorrow.    2022: Patient seen and examined.  She continues to complain of difficulty swallowing and pain with swallowing.  She did use the ice pack as recommended but all the ice has melted.  Otherwise she states no new complaints.    Review of Systems   Constitutional: Negative. Negative for chills and fever.   HENT: Positive for odynophagia.    Cardiovascular: Negative.    Respiratory: Negative.    Skin: Negative.    Musculoskeletal: Positive for neck pain.   Gastrointestinal: Positive for dysphagia. Negative for abdominal pain, nausea and vomiting.   Genitourinary: Negative.    Neurological: Negative.    Psychiatric/Behavioral: Negative.    All other systems reviewed and are negative.       Objective      Vitals:   Temp:  [97.5 °F (36.4 °C)-98 °F (36.7 °C)] 98 °F (36.7 °C)  Heart Rate:  [] 85  Resp:  [15-18] 16  BP: (115-159)/(67-92)  122/80  Flow (L/min):  [2-3] 2    Physical Exam   Vital signs and nurses notes reviewed.  General: well-developed and well-nourished, NAD  HEENT: NC/AT, EOMI, PERRLA, anterior neck surgical dressing in place  Heart: RRR. No murmur   Chest: CTAB, no w/r/r, normal respiratory effort  Abdominal: Soft. NT/ND. Bowel sounds present  Musculoskeletal: Normal ROM.  No edema. No calf tenderness.  Neurological: AAOx3, no focal deficits  Skin: Skin is warm and dry. No rash  Psychiatric: Normal mood and affect.      Result Review    Result Review:  I have personally reviewed the results from the time of this admission to 1/15/2022 15:51 EST and agree with these findings:  [x]  Laboratory  [x]  Microbiology  [x]  Radiology  [x]  EKG/Telemetry   [x]  Cardiology/Vascular   []  Pathology  []  Old records  []  Other:      Wounds (last 24 hours)     LDA Wound     Row Name 01/15/22 1515 01/15/22 0715 01/14/22 1909       Wound 01/14/22 0847 Right neck Incision    Wound - Properties Group Placement Date: 01/14/22  -LT Placement Time: 0847  -LT Side: Right  -LT Location: neck  -LT Primary Wound Type: Incision  -LT    Dressing Appearance -- intact; dry; no drainage  -XC dry; intact; no drainage  -MT    Closure BILLY  -TS BILLY  -XC BILLY  -MT    Base dressing in place, unable to visualize  -TS dressing in place, unable to visualize  -XC dressing in place, unable to visualize  -MT    Drainage Amount -- -- none  -MT    Retired Wound - Properties Group Date first assessed: 01/14/22  -LT Time first assessed: 0847  -LT Side: Right  -LT Location: neck  -LT Primary Wound Type: Incision  -LT    Row Name 01/14/22 1630             Wound 01/14/22 0847 Right neck Incision    Wound - Properties Group Placement Date: 01/14/22  -LT Placement Time: 0847  -LT Side: Right  -LT Location: neck  -LT Primary Wound Type: Incision  -LT      Dressing Appearance dry; intact  -SM      Closure BILLY  -SM      Retired Wound - Properties Group Date first assessed: 01/14/22   "-LT Time first assessed: 0847  -LT Side: Right  -LT Location: neck  -LT Primary Wound Type: Incision  -LT            User Key  (r) = Recorded By, (t) = Taken By, (c) = Cosigned By    Initials Name Provider Type    LT Elizabeth Clancy, RN Registered Nurse    Gina Beltran RN Registered Nurse    Anastasiia Sage RN Registered Nurse    Inez Thomas, RN Registered Nurse    Jazmyne Hernandez RN Registered Nurse                  Assessment/Plan      Brief Patient Summary:  Marjorie FLORENTINO is a 56 y.o. female who presents emergency department with complaint of back pain, and \"back seizing\" that has gradually worsened since she had a fall in October 2021.  Patient reports she has had weakness in her hands, and she urinates when she stands up.  She complains of muscle spasms, and generally feeling weak.  Patient has been seen by her primary care provider, has had MRI of the brain, cervical spine and lumbar spine since her symptoms began. She denies any other bowel or bladder symptoms.           dexamethasone, 4 mg, Intravenous, Q6H  enoxaparin, 40 mg, Subcutaneous, Daily  lidocaine, 1 patch, Transdermal, Q24H  losartan, 25 mg, Oral, Daily  senna-docusate sodium, 2 tablet, Oral, BID  sertraline, 25 mg, Oral, Daily  sodium chloride, 10 mL, Intravenous, Q12H  sodium chloride, 10 mL, Intravenous, Q12H             Active Hospital Problems:  Active Hospital Problems    Diagnosis    • **Cervical myelopathy (HCC)    • Acute bilateral low back pain    • Urinary incontinence    • Obesity (BMI 30-39.9)    • Depression    • Intervertebral cervical disc disorder with myelopathy, cervical region      Added automatically from request for surgery [id number]     • Primary hypertension      Plan:   Acute bilateral low back pain with Urinary incontinence  Cervical myelopathy   -Status post anterior cervical discectomy and fusion 1/14/2022  -Continue pain medication  -PT following --recommending inpatient rehab at discharge  -encourage " IS  -Neurovascular checks  -Neurosurgery following-May start chemical DVT prophylaxis tomorrow     Primary hypertension, chronic  -continue losartan  -hold HCTZ for surgery  -monitor BP and vitals     Depression  -continue Zoloft      Obesity  -BMI 30.13 on admission  -encourage lifestyle modifications        DVT prophylaxis:  Medical and mechanical DVT prophylaxis orders are present.    CODE STATUS:    Level Of Support Discussed With: Patient  Code Status (Patient has no pulse and is not breathing): CPR (Attempt to Resuscitate)  Medical Interventions (Patient has pulse or is breathing): Full Support      Disposition:  I expect patient to be discharged to inpatient rehab in 2 to 3 days when cleared by neurosurgery    This patient has been examined wearing appropriate Personal Protective Equipment and discussed with hospital infection control department. 01/15/22      Electronically signed by Renetta Gomez MD, 01/15/22, 15:51 EST.  [facility] Hospitalist [facility]

## 2022-01-15 NOTE — PLAN OF CARE
Goal Outcome Evaluation:  Plan of Care Reviewed With: patient           Outcome Summary: 55 y/o female admitted on 1/11 due to onset of back pain with decreased ability to ambulate; found iwht severe stenosis now s/p C3-5 ant cervical discectomy and fusion on 1/14. Pt with post op numbness , dec sensation to BUE as well as difficulty swallowing. Intact BLE sensation to light touch. Pt needed min A for supine to sit transfer. CGA /min A to come to standing with cues.Pt able to ambulate 50 ft using rw ; gait narrow, small shuffling steps and tremulous but no buckling  noted. Pt will benefit from acute IP rehab prior to return home.

## 2022-01-15 NOTE — PROGRESS NOTES
Neurosurgery Progress Note      Patient: Marjorie FLORENTINO    YOB: 1965    Medical Record Number: [id number]    Attending Physician: Renetta Gomez MD    Date of Admission: 1/11/2022  3:26 PM    Status Post: C3-C5 ACDF    Post Operative Day Number: 1    Admitting Dx: Back pain [M54.9]  Acute bilateral low back pain, unspecified whether sciatica present [M54.50]  Cervical myelopathy (HCC) [G95.9]    General Appearance:  56 y.o. female awake and alert sitting up in chair in no acute distress.  Patient reports no significant difference in her preoperative symptoms but does feel her dexterity in her hands is slightly better.  She still reports difficulty swallowing but has experienced no choking.    Current Problem List:   Patient Active Problem List   Diagnosis   • Primary hypertension   • Encounter for general adult medical examination without abnormal findings   • Cervical myelopathy (HCC)   • Acute bilateral low back pain   • Urinary incontinence   • Depression   • Obesity (BMI 30-39.9)   • Intervertebral cervical disc disorder with myelopathy, cervical region           Current Medications:  Scheduled Meds:enoxaparin, 40 mg, Subcutaneous, Daily  lidocaine, 1 patch, Transdermal, Q24H  losartan, 25 mg, Oral, Daily  senna-docusate sodium, 2 tablet, Oral, BID  sertraline, 25 mg, Oral, Daily  sodium chloride, 10 mL, Intravenous, Q12H  sodium chloride, 10 mL, Intravenous, Q12H      Continuous Infusions:   PRN Meds:.•  acetaminophen  •  senna-docusate sodium **AND** polyethylene glycol **AND** bisacodyl **AND** bisacodyl  •  bisacodyl  •  cyclobenzaprine  •  docusate sodium  •  HYDROcodone-acetaminophen  •  HYDROmorphone **AND** naloxone  •  magnesium hydroxide  •  melatonin  •  ondansetron  •  sodium chloride  •  sodium chloride      Diagnostic Tests:    Lab Results (last 24 hours)     Procedure Component Value Units Date/Time    Basic Metabolic Panel [[id number]]  (Abnormal) Collected: 01/15/22 9824     Specimen: Blood Updated: 01/15/22 0434     Glucose 111 mg/dL      BUN 12 mg/dL      Creatinine 0.63 mg/dL      Sodium 141 mmol/L      Potassium 4.2 mmol/L      Chloride 103 mmol/L      CO2 28.0 mmol/L      Calcium 8.9 mg/dL      eGFR Non African Amer 98 mL/min/1.73      BUN/Creatinine Ratio 19.0     Anion Gap 10.0 mmol/L     Narrative:      GFR Normal >60  Chronic Kidney Disease <60  Kidney Failure <15      CBC & Differential [[id number]]  (Abnormal) Collected: 01/15/22 0340    Specimen: Blood Updated: 01/15/22 0405    Narrative:      The following orders were created for panel order CBC & Differential.  Procedure                               Abnormality         Status                     ---------                               -----------         ------                     CBC Auto Differential[[id number]]        Abnormal            Final result                 Please view results for these tests on the individual orders.    CBC Auto Differential [[id number]]  (Abnormal) Collected: 01/15/22 0340    Specimen: Blood Updated: 01/15/22 0405     WBC 9.70 10*3/mm3      RBC 3.49 10*6/mm3      Hemoglobin 11.3 g/dL      Hematocrit 32.4 %      MCV 93.1 fL      MCH 32.3 pg      MCHC 34.7 g/dL      RDW 12.7 %      RDW-SD 41.6 fl      MPV 7.2 fL      Platelets 308 10*3/mm3      Neutrophil % 78.2 %      Lymphocyte % 13.6 %      Monocyte % 7.6 %      Eosinophil % 0.2 %      Basophil % 0.4 %      Neutrophils, Absolute 7.60 10*3/mm3      Lymphocytes, Absolute 1.30 10*3/mm3      Monocytes, Absolute 0.70 10*3/mm3      Eosinophils, Absolute 0.00 10*3/mm3      Basophils, Absolute 0.00 10*3/mm3      nRBC 0.0 /100 WBC     Basic Metabolic Panel [[id number]]  (Abnormal) Collected: 01/14/22 1446    Specimen: Blood Updated: 01/14/22 1522     Glucose 108 mg/dL      BUN 15 mg/dL      Creatinine 0.57 mg/dL      Sodium 136 mmol/L      Potassium 3.9 mmol/L      Chloride 100 mmol/L      CO2 27.0 mmol/L      Calcium 8.8 mg/dL      eGFR Non African  Amer 110 mL/min/1.73      BUN/Creatinine Ratio 26.3     Anion Gap 9.0 mmol/L     Narrative:      GFR Normal >60  Chronic Kidney Disease <60  Kidney Failure <15      CBC (No Diff) [[id number]]  (Abnormal) Collected: 01/14/22 1446    Specimen: Blood Updated: 01/14/22 1503     WBC 12.70 10*3/mm3      RBC 3.53 10*6/mm3      Hemoglobin 11.2 g/dL      Hematocrit 32.7 %      MCV 92.6 fL      MCH 31.6 pg      MCHC 34.1 g/dL      RDW 12.7 %      RDW-SD 41.6 fl      MPV 7.1 fL      Platelets 301 10*3/mm3           Imaging Results (Last 24 Hours)     Procedure Component Value Units Date/Time    FL C Arm During Surgery [[id number]] Resulted: 01/14/22 1356     Updated: 01/14/22 1357    XR Spine Cervical 2 or 3 View [[id number]] Resulted: 01/14/22 1355     Updated: 01/14/22 1355          Physical Exam:   General Appearance:  Alert, cooperative, in no acute distress   Head:  Normocephalic, without obvious abnormality, atraumatic   Neck:    Incision is well approximated with no drainage.   Abdomen:   nontender, nondistended   Extremities/MSK: Moves all extremities well, no edema, no cyanosis, no             Redness no sign of DVT   Skin: No bleeding, bruising or rash   Neurologic:  Alert and oriented x3  Moving all extremities no difficulty           Vitals:    01/14/22 2138 01/15/22 0003 01/15/22 0301 01/15/22 0440   BP: 159/84 123/75 125/74 115/72   BP Location: Right arm Right arm Right arm Right arm   Patient Position: Sitting Lying Sitting Sitting   Pulse: 103 83 81 79   Resp: 18 16  18   Temp: 98 °F (36.7 °C) 97.7 °F (36.5 °C)  98 °F (36.7 °C)   TempSrc: Oral Oral  Oral   SpO2: 98% 97%  100%   Weight: 96.5 kg (212 lb 11.2 oz)      Height:             Assessment: 56-year-old female with progressive myelopathy secondary to spinal canal stenosis.  She is postop day 1 from two-level anterior cervical discectomy and fusion.  Patient is doing well with improvement in her dexterity and sensation.  She still complains of some fullness  upon swallowing.  I will place her on a couple doses of steroids to help with swelling.  I did encourage her to chew and swallow ice as well as utilizing ice packs to her neck to also aid in reducing swelling and discomfort.  Postop x-rays are still pending.    Plan:    Decadron 4 mg every 6 X 4 doses  Continue Pain management efforts  Continue mobilization efforts  Continue incisional care  Continue DVT Prophylaxis    Discharge Plan: Pending clinical course    Date: 1/15/2022    Tierra Mukherjee, EZIO  07:28 EST

## 2022-01-15 NOTE — PLAN OF CARE
Goal Outcome Evaluation:  Plan of Care Reviewed With: patient        Progress: no change  Outcome Summary: Pt is stable and resting in bed. Pain controlled. Steroid started to help with swelling and trouble swallowing. Will continue to monitor.

## 2022-01-15 NOTE — THERAPY EVALUATION
Patient Name: Marjorie FLORENTINO  : 1965    MRN: [id number]                              Today's Date: 1/15/2022       Admit Date: 2022    Visit Dx:     ICD-10-CM ICD-9-CM   1. Acute bilateral low back pain, unspecified whether sciatica present  M54.50 724.2   2. Intervertebral cervical disc disorder with myelopathy, cervical region  M50.00 722.71     Patient Active Problem List   Diagnosis   • Primary hypertension   • Encounter for general adult medical examination without abnormal findings   • Cervical myelopathy (HCC)   • Acute bilateral low back pain   • Urinary incontinence   • Depression   • Obesity (BMI 30-39.9)   • Intervertebral cervical disc disorder with myelopathy, cervical region     Past Medical History:   Diagnosis Date   • Depression    • Hypertension      Past Surgical History:   Procedure Laterality Date   •  SECTION      x3       General Information     Row Name 01/15/22 1403          Physical Therapy Time and Intention    Document Type evaluation  -CR     Mode of Treatment physical therapy  -CR     Row Name 01/15/22 1403          General Information    Patient Profile Reviewed yes  -CR     Prior Level of Function independent:; all household mobility  -CR     Existing Precautions/Restrictions spinal  -CR     Barriers to Rehab medically complex  -CR     Row Name 01/15/22 1403          Living Environment    Lives With child(roshan), adult  -CR     Row Name 01/15/22 1403          Home Main Entrance    Number of Stairs, Main Entrance none  -CR     Row Name 01/15/22 1403          Stairs Within Home, Primary    Number of Stairs, Within Home, Primary none  -CR     Row Name 01/15/22 1403          Cognition    Orientation Status (Cognition) oriented x 3  -CR     Row Name 01/15/22 1403          Safety Issues, Functional Mobility    Impairments Affecting Function (Mobility) balance; coordination; motor control; sensation/sensory awareness; range of motion (ROM); endurance/activity tolerance;  grasp; pain  -CR           User Key  (r) = Recorded By, (t) = Taken By, (c) = Cosigned By    Initials Name Provider Type    CR Reyes, Carmela, PT Physical Therapist               Mobility     Row Name 01/15/22 1404          Bed Mobility    Bed Mobility supine-sit  -CR     Supine-Sit [place] (Bed Mobility) minimum assist (75% patient effort)  -CR     Assistive Device (Bed Mobility) bed rails; draw sheet  -CR     Row Name 01/15/22 1404          Bed-Chair Transfer    Bed-Chair [place] (Transfers) contact guard  -CR     Assistive Device (Bed-Chair Transfers) walker, front-wheeled  -CR     Row Name 01/15/22 1404          Sit-Stand Transfer    Sit-Stand [place] (Transfers) minimum assist (75% patient effort)  -CR     Assistive Device (Sit-Stand Transfers) walker, front-wheeled  -CR     Row Name 01/15/22 1404          Gait/Stairs (Locomotion)    [place] Level (Gait) contact guard  -CR     Assistive Device (Gait) walker, front-wheeled  -CR     Distance in Feet (Gait) 50  -CR     Deviations/Abnormal Patterns (Gait) gait speed decreased; base of support, narrow  -CR           User Key  (r) = Recorded By, (t) = Taken By, (c) = Cosigned By    Initials Name Provider Type    CR Reyes, Carmela, PT Physical Therapist               Obj/Interventions     Row Name 01/15/22 1405          Range of Motion Comprehensive    Comment, General Range of Motion AROM BUE mod limit; BLE wfl  -CR     Row Name 01/15/22 1405          Strength Comprehensive (MMT)    Comment, General Manual Muscle Testing (MMT) Assessment BLE grossly 3/5  -CR     Row Name 01/15/22 1405          Balance    Balance Assessment sitting static balance; standing static balance; standing dynamic balance  -CR     Static Sitting Balance WFL; sitting, edge of bed  -CR     Static Standing Balance mild impairment; supported  -CR     Dynamic Standing Balance mild impairment; supported  -CR     Comment, Balance rw for standing support  -CR     Row Name 01/15/22  1409          Sensory Assessment (Somatosensory)    Sensory Assessment (Somatosensory) --  dec sensation BUE  -CR           User Key  (r) = Recorded By, (t) = Taken By, (c) = Cosigned By    Initials Name Provider Type    CR Reyes, Carmela, PT Physical Therapist               Goals/Plan     Row Name 01/15/22 1410          Bed Mobility Goal 1 (PT)    Activity/Assistive Device (Bed Mobility Goal 1, PT) sit to supine/supine to sit  -CR     [place] Level/Cues Needed (Bed Mobility Goal 1, PT) modified independence  -CR     Time Frame (Bed Mobility Goal 1, PT) 1 week  -CR     Row Name 01/15/22 1410          Transfer Goal 1 (PT)    Activity/Assistive Device (Transfer Goal 1, PT) transfers, all; walker, rolling  -CR     [place] Level/Cues Needed (Transfer Goal 1, PT) standby assist  -CR     Time Frame (Transfer Goal 1, PT) 1 week  -CR     Row Name 01/15/22 1410          Gait Training Goal 1 (PT)    Activity/Assistive Device (Gait Training Goal 1, PT) gait (walking locomotion); assistive device use; decrease fall risk; diminish gait deviation; improve balance and speed; increase endurance/gait distance; walker, rolling  -CR     [place] Level (Gait Training Goal 1, PT) contact guard assist  -CR     Distance (Gait Training Goal 1, PT) 80 x 2  -CR     Time Frame (Gait Training Goal 1, PT) 1 week  -CR           User Key  (r) = Recorded By, (t) = Taken By, (c) = Cosigned By    Initials Name Provider Type    CR Reyes, Carmela, PT Physical Therapist               Clinical Impression     Row Name 01/15/22 140          Pain    Additional Documentation Pain Scale: Numbers Pre/Post-Treatment (Group)  -CR     Row Name 01/15/22 1406          Pain Scale: Numbers Pre/Post-Treatment    Pretreatment Pain Rating 4/10  -CR     Posttreatment Pain Rating 4/10  -CR     Pain Location - Orientation incisional  -CR     Pain Location neck  -CR     Pain Intervention(s) Cold applied  -CR     Row Name 01/15/22 1401          Plan of Care  Review    Plan of Care Reviewed With patient  -CR     Outcome Summary 55 y/o female admitted on 1/11 due to onset of back pain with decreased ability to ambulate; found iwht severe stenosis now s/p C3-5 ant cervical discectomy and fusion on 1/14. Pt with post op numbness , dec sensation to BUE as well as difficulty swallowing. Intact BLE sensation to light touch. Pt needed min A for supine to sit transfer. CGA /min A to come to standing with cues.Pt able to ambulate 50 ft using rw ; gait narrow, small shuffling steps and tremulous but no buckling  noted. Pt will benefit from acute IP rehab prior to return home.  -CR     Row Name 01/15/22 1408          Therapy Assessment/Plan (PT)    Patient/Family Therapy Goals Statement (PT) pt is not sure plan  -CR     Rehab Potential (PT) good, to achieve stated therapy goals  -CR     Criteria for Skilled Interventions Met (PT) yes; skilled treatment is necessary  -CR     Predicted Duration of Therapy Intervention (PT) dc  -CR           User Key  (r) = Recorded By, (t) = Taken By, (c) = Cosigned By    Initials Name Provider Type    CR Reyes, Carmela, PT Physical Therapist               Outcome Measures     Row Name 01/15/22 1411          How much help from another person do you currently need...    Turning from your back to your side while in flat bed without using bedrails? 3  -CR     Moving from lying on back to sitting on the side of a flat bed without bedrails? 3  -CR     Moving to and from a bed to a chair (including a wheelchair)? 3  -CR     Standing up from a chair using your arms (e.g., wheelchair, bedside chair)? 3  -CR     Climbing 3-5 steps with a railing? 2  -CR     To walk in hospital room? 3  -CR     AM-PAC 6 Clicks Score (PT) 17  -CR     Row Name 01/15/22 1411          Functional Assessment    Outcome Measure Options AM-PAC 6 Clicks Basic Mobility (PT)  -CR           User Key  (r) = Recorded By, (t) = Taken By, (c) = Cosigned By    Initials Name Provider Type    MAXX  Reyes, Carmela, GARO Physical Therapist                             Physical Therapy Education                 Title: PT OT SLP Therapies (In Progress)     Topic: Physical Therapy (In Progress)     Point: Mobility training (In Progress)     Learning Progress Summary           Patient Acceptance, E, NR by CR at 1/15/2022 1412                   Point: Home exercise program (Not Started)     Learner Progress:  Not documented in this visit.          Point: Body mechanics (Not Started)     Learner Progress:  Not documented in this visit.          Point: Precautions (Not Started)     Learner Progress:  Not documented in this visit.                      User Key     Initials Effective Dates Name Provider Type Discipline    CR 06/16/21 -  Reyes, Carmela, PT Physical Therapist PT              PT Recommendation and Plan  Planned Therapy Interventions (PT): balance training, bed mobility training, gait training, home exercise program, patient/family education, strengthening, ROM (range of motion), transfer training, neuromuscular re-education  Plan of Care Reviewed With: patient  Outcome Summary: 55 y/o female admitted on 1/11 due to onset of back pain with decreased ability to ambulate; found iwht severe stenosis now s/p C3-5 ant cervical discectomy and fusion on 1/14. Pt with post op numbness , dec sensation to BUE as well as difficulty swallowing. Intact BLE sensation to light touch. Pt needed min A for supine to sit transfer. CGA /min A to come to standing with cues.Pt able to ambulate 50 ft using rw ; gait narrow, small shuffling steps and tremulous but no buckling  noted. Pt will benefit from acute IP rehab prior to return home.     Time Calculation:    PT Charges     Row Name 01/15/22 1412             Time Calculation    Start Time 1024  -CR      Stop Time 1047  -CR      Time Calculation (min) 23 min  -CR      PT Received On 01/15/22  -CR      PT - Next Appointment 01/16/22  -CR      PT Goal Re-Cert Due Date 01/29/22  -CR               Time Calculation- PT    Total Timed Code Minutes- PT 0 minute(s)  -CR            User Key  (r) = Recorded By, (t) = Taken By, (c) = Cosigned By    Initials Name Provider Type    CR Reyes, Carmela, PT Physical Therapist              Therapy Charges for Today     Code Description Service Date Service Provider Modifiers Qty    [id number] HC PT EVAL LOW COMPLEXITY 4 1/15/2022 Reyes, Carmela, PT GP 1          PT G-Codes  Outcome Measure Options: AM-PAC 6 Clicks Basic Mobility (PT)  AM-PAC 6 Clicks Score (PT): 17    Carmela Reyes, PT  1/15/2022

## 2022-01-16 ENCOUNTER — APPOINTMENT (OUTPATIENT)
Dept: GENERAL RADIOLOGY | Facility: HOSPITAL | Age: 57
End: 2022-01-16

## 2022-01-16 LAB
ANION GAP SERPL CALCULATED.3IONS-SCNC: 12 MMOL/L (ref 5–15)
BASOPHILS # BLD AUTO: 0.1 10*3/MM3 (ref 0–0.2)
BASOPHILS NFR BLD AUTO: 0.8 % (ref 0–1.5)
BUN SERPL-MCNC: 13 MG/DL (ref 6–20)
BUN/CREAT SERPL: 26.5 (ref 7–25)
CALCIUM SPEC-SCNC: 9.4 MG/DL (ref 8.6–10.5)
CHLORIDE SERPL-SCNC: 103 MMOL/L (ref 98–107)
CO2 SERPL-SCNC: 27 MMOL/L (ref 22–29)
CREAT SERPL-MCNC: 0.49 MG/DL (ref 0.57–1)
DEPRECATED RDW RBC AUTO: 42.4 FL (ref 37–54)
EOSINOPHIL # BLD AUTO: 0.3 10*3/MM3 (ref 0–0.4)
EOSINOPHIL NFR BLD AUTO: 2.6 % (ref 0.3–6.2)
ERYTHROCYTE [DISTWIDTH] IN BLOOD BY AUTOMATED COUNT: 12.9 % (ref 12.3–15.4)
GFR SERPL CREATININE-BSD FRML MDRD: 131 ML/MIN/1.73
GLUCOSE SERPL-MCNC: 125 MG/DL (ref 65–99)
HCT VFR BLD AUTO: 31.9 % (ref 34–46.6)
HGB BLD-MCNC: 11 G/DL (ref 12–15.9)
LYMPHOCYTES # BLD AUTO: 0.9 10*3/MM3 (ref 0.7–3.1)
LYMPHOCYTES NFR BLD AUTO: 7.9 % (ref 19.6–45.3)
MCH RBC QN AUTO: 32.2 PG (ref 26.6–33)
MCHC RBC AUTO-ENTMCNC: 34.4 G/DL (ref 31.5–35.7)
MCV RBC AUTO: 93.6 FL (ref 79–97)
MONOCYTES # BLD AUTO: 0.5 10*3/MM3 (ref 0.1–0.9)
MONOCYTES NFR BLD AUTO: 4.3 % (ref 5–12)
NEUTROPHILS NFR BLD AUTO: 84.4 % (ref 42.7–76)
NEUTROPHILS NFR BLD AUTO: 9.3 10*3/MM3 (ref 1.7–7)
NRBC BLD AUTO-RTO: 0 /100 WBC (ref 0–0.2)
PLATELET # BLD AUTO: 312 10*3/MM3 (ref 140–450)
PMV BLD AUTO: 7.8 FL (ref 6–12)
POTASSIUM SERPL-SCNC: 4.4 MMOL/L (ref 3.5–5.2)
RBC # BLD AUTO: 3.41 10*6/MM3 (ref 3.77–5.28)
SODIUM SERPL-SCNC: 142 MMOL/L (ref 136–145)
WBC NRBC COR # BLD: 11.1 10*3/MM3 (ref 3.4–10.8)

## 2022-01-16 PROCEDURE — 25010000002 DEXAMETHASONE PER 1 MG: Performed by: NURSE PRACTITIONER

## 2022-01-16 PROCEDURE — 97116 GAIT TRAINING THERAPY: CPT

## 2022-01-16 PROCEDURE — 25010000002 HYDROMORPHONE PER 4 MG

## 2022-01-16 PROCEDURE — 99232 SBSQ HOSP IP/OBS MODERATE 35: CPT | Performed by: INTERNAL MEDICINE

## 2022-01-16 PROCEDURE — 72040 X-RAY EXAM NECK SPINE 2-3 VW: CPT

## 2022-01-16 PROCEDURE — 85025 COMPLETE CBC W/AUTO DIFF WBC: CPT | Performed by: INTERNAL MEDICINE

## 2022-01-16 PROCEDURE — 80048 BASIC METABOLIC PNL TOTAL CA: CPT | Performed by: INTERNAL MEDICINE

## 2022-01-16 PROCEDURE — 25010000002 ENOXAPARIN PER 10 MG

## 2022-01-16 PROCEDURE — 97530 THERAPEUTIC ACTIVITIES: CPT

## 2022-01-16 PROCEDURE — 99024 POSTOP FOLLOW-UP VISIT: CPT | Performed by: NURSE PRACTITIONER

## 2022-01-16 RX ORDER — DEXAMETHASONE SODIUM PHOSPHATE 4 MG/ML
4 INJECTION, SOLUTION INTRA-ARTICULAR; INTRALESIONAL; INTRAMUSCULAR; INTRAVENOUS; SOFT TISSUE EVERY 6 HOURS
Status: COMPLETED | OUTPATIENT
Start: 2022-01-16 | End: 2022-01-17

## 2022-01-16 RX ADMIN — DEXAMETHASONE SODIUM PHOSPHATE 4 MG: 4 INJECTION, SOLUTION INTRA-ARTICULAR; INTRALESIONAL; INTRAMUSCULAR; INTRAVENOUS; SOFT TISSUE at 04:17

## 2022-01-16 RX ADMIN — DEXAMETHASONE SODIUM PHOSPHATE 4 MG: 4 INJECTION, SOLUTION INTRA-ARTICULAR; INTRALESIONAL; INTRAMUSCULAR; INTRAVENOUS; SOFT TISSUE at 21:49

## 2022-01-16 RX ADMIN — CYCLOBENZAPRINE 10 MG: 10 TABLET, FILM COATED ORAL at 00:22

## 2022-01-16 RX ADMIN — DEXAMETHASONE SODIUM PHOSPHATE 4 MG: 4 INJECTION, SOLUTION INTRA-ARTICULAR; INTRALESIONAL; INTRAMUSCULAR; INTRAVENOUS; SOFT TISSUE at 10:24

## 2022-01-16 RX ADMIN — HYDROMORPHONE HYDROCHLORIDE 0.5 MG: 2 INJECTION, SOLUTION INTRAMUSCULAR; INTRAVENOUS; SUBCUTANEOUS at 10:31

## 2022-01-16 RX ADMIN — LOSARTAN POTASSIUM 25 MG: 25 TABLET, FILM COATED ORAL at 09:32

## 2022-01-16 RX ADMIN — HYDROMORPHONE HYDROCHLORIDE 0.5 MG: 2 INJECTION, SOLUTION INTRAMUSCULAR; INTRAVENOUS; SUBCUTANEOUS at 00:22

## 2022-01-16 RX ADMIN — HYDROMORPHONE HYDROCHLORIDE 0.5 MG: 2 INJECTION, SOLUTION INTRAMUSCULAR; INTRAVENOUS; SUBCUTANEOUS at 15:50

## 2022-01-16 RX ADMIN — SODIUM CHLORIDE, PRESERVATIVE FREE 10 ML: 5 INJECTION INTRAVENOUS at 21:48

## 2022-01-16 RX ADMIN — LIDOCAINE 1 PATCH: 50 PATCH TOPICAL at 21:48

## 2022-01-16 RX ADMIN — SERTRALINE 25 MG: 25 TABLET, FILM COATED ORAL at 09:32

## 2022-01-16 RX ADMIN — HYDROMORPHONE HYDROCHLORIDE 0.5 MG: 2 INJECTION, SOLUTION INTRAMUSCULAR; INTRAVENOUS; SUBCUTANEOUS at 21:49

## 2022-01-16 RX ADMIN — SODIUM CHLORIDE, PRESERVATIVE FREE 10 ML: 5 INJECTION INTRAVENOUS at 09:32

## 2022-01-16 RX ADMIN — DEXAMETHASONE SODIUM PHOSPHATE 4 MG: 4 INJECTION, SOLUTION INTRA-ARTICULAR; INTRALESIONAL; INTRAMUSCULAR; INTRAVENOUS; SOFT TISSUE at 15:50

## 2022-01-16 RX ADMIN — ENOXAPARIN SODIUM 40 MG: 40 INJECTION SUBCUTANEOUS at 15:50

## 2022-01-16 RX ADMIN — SODIUM CHLORIDE, PRESERVATIVE FREE 10 ML: 5 INJECTION INTRAVENOUS at 21:50

## 2022-01-16 RX ADMIN — HYDROMORPHONE HYDROCHLORIDE 0.5 MG: 2 INJECTION, SOLUTION INTRAMUSCULAR; INTRAVENOUS; SUBCUTANEOUS at 04:17

## 2022-01-16 NOTE — PROGRESS NOTES
[facility] Medicine Services Daily Progress Note    Patient Name: Marjorie FLORENTINO  : 1965  MRN: [id number]  Primary Care Physician:  Lurdes West APRN  Date of admission: 2022      Subjective      Chief Complaint: Back pain    HPI:    Marjorie FLORENTINO is a 56 y.o. female with a history of HTN and depression who presented to the ER at [facility] on 2022 complaining of back pain, muscle spasms, urinary incontinence, and weakness. Workup in the ER was unremarkable. She was admitted to the ED Observation Unit.     22:  Neurosurgery was consulted and ordered MRI cervical spine to assess cervical myelopathy.     22:  The patient's MRI was abnormal and neurosurgery recommends surgical intervention. She was admitted to the Hospitalist group for further treatment.    22: Patient reports adequate pain control and denies specific complaints other than some difficulty swallowing and clearing her secretions and she has a suction that she is using that helps.  Neurosurgery-May start chemical DVT prophylaxis tomorrow.    2022: Patient seen and examined.  She continues to complain of difficulty swallowing and pain with swallowing.  She did use the ice pack as recommended but all the ice has melted.  Otherwise she states no new complaints.    1/15/2022: Patient seen and examined.  She continues to have difficulty swallowing mostly with liquids, but she has been able to eat soft foods.  Per discussion with nurse, patient is hesitant to do much on her own and has family assisting her more than required.    Review of Systems   Constitutional: Negative. Negative for chills and fever.   HENT: Positive for odynophagia.    Cardiovascular: Negative.    Respiratory: Negative.    Skin: Negative.    Musculoskeletal: Positive for neck pain.   Gastrointestinal: Positive for dysphagia. Negative for abdominal pain, nausea and vomiting.   Genitourinary: Negative.     Neurological: Negative.    Psychiatric/Behavioral: Negative.    All other systems reviewed and are negative.       Objective      Vitals:   Temp:  [97.4 °F (36.3 °C)-98.1 °F (36.7 °C)] 97.6 °F (36.4 °C)  Heart Rate:  [70-88] 70  Resp:  [16-19] 16  BP: (122-149)/(67-85) 125/80    Physical Exam   Vital signs and nurses notes reviewed.  General: well-developed and well-nourished, NAD  HEENT: NC/AT, EOMI, PERRLA, anterior neck incisional site C/D/I, no abnormal erythema or edema, no drainage  Heart: RRR. No murmur   Chest: CTAB, no w/r/r, normal respiratory effort  Abdominal: Soft. NT/ND. Bowel sounds present  Musculoskeletal: Normal ROM.  No edema. No calf tenderness.  Neurological: AAOx3, no focal deficits  Skin: Skin is warm and dry. No rash  Psychiatric: Normal mood and affect.      Result Review    Result Review:  I have personally reviewed the results from the time of this admission to 1/16/2022 12:52 EST and agree with these findings:  [x]  Laboratory  [x]  Microbiology  [x]  Radiology  [x]  EKG/Telemetry   [x]  Cardiology/Vascular   []  Pathology  []  Old records  []  Other:      Wounds (last 24 hours)     LDA Wound     Row Name 01/16/22 0715 01/15/22 1949 01/15/22 1515       Wound 01/14/22 0847 Right neck Incision    Wound - Properties Group Placement Date: 01/14/22  -LT Placement Time: 0847  -LT Side: Right  -LT Location: neck  -LT Primary Wound Type: Incision  -LT    Dressing Appearance dry; dressing loose  -XC dry; dressing loose  -MT --    Closure BILLY  -XC BILLY  -MT BILLY  -TS    Base dressing in place, unable to visualize  -XC dressing in place, unable to visualize  -MT dressing in place, unable to visualize  -TS    Drainage Amount -- none  -MT --    Retired Wound - Properties Group Date first assessed: 01/14/22  -LT Time first assessed: 0847  -LT Side: Right  -LT Location: neck  -LT Primary Wound Type: Incision  -LT          User Key  (r) = Recorded By, (t) = Taken By, (c) = Cosigned By    Initials Name  "Provider Type    LT Elizabeth Clancy, RN Registered Nurse    Gina Beltran RN Registered Nurse    XC Anastasiia Saunders, RN Registered Nurse    Inez Thomas, RN Registered Nurse                  Assessment/Plan      Brief Patient Summary:  Marjorie FLORENTINO is a 56 y.o. female who presents emergency department with complaint of back pain, and \"back seizing\" that has gradually worsened since she had a fall in October 2021.  Patient reports she has had weakness in her hands, and she urinates when she stands up.  She complains of muscle spasms, and generally feeling weak.  Patient has been seen by her primary care provider, has had MRI of the brain, cervical spine and lumbar spine since her symptoms began. She denies any other bowel or bladder symptoms.    The patient underwent cervical discectomy and fusion via anterior approach on January 14, 2022.  Postoperatively PT has been working with her and are currently recommending inpatient rehab at discharge.  Patient is agreeable.         dexamethasone, 4 mg, Intravenous, Q6H  enoxaparin, 40 mg, Subcutaneous, Daily  lidocaine, 1 patch, Transdermal, Q24H  losartan, 25 mg, Oral, Daily  senna-docusate sodium, 2 tablet, Oral, BID  sertraline, 25 mg, Oral, Daily  sodium chloride, 10 mL, Intravenous, Q12H  sodium chloride, 10 mL, Intravenous, Q12H             Active Hospital Problems:  Active Hospital Problems    Diagnosis    • **Cervical myelopathy (HCC)    • Acute bilateral low back pain    • Urinary incontinence    • Obesity (BMI 30-39.9)    • Depression    • Intervertebral cervical disc disorder with myelopathy, cervical region      Added automatically from request for surgery [id number]     • Primary hypertension      Plan:   Acute bilateral low back pain with Urinary incontinence  Cervical myelopathy   -Status post anterior cervical discectomy and fusion 1/14/2022  -Continue pain medication  -PT following --recommending inpatient rehab at discharge  -encourage " IS  -Neurovascular checks  -Neurosurgery following    -- Repeat cervical x-rays     Primary hypertension, chronic  -continue losartan  -hold HCTZ for surgery  -monitor BP and vitals     Depression  -continue Zoloft      Obesity  -BMI 30.13 on admission  -encourage lifestyle modifications        DVT prophylaxis:  Medical and mechanical DVT prophylaxis orders are present.    CODE STATUS:    Level Of Support Discussed With: Patient  Code Status (Patient has no pulse and is not breathing): CPR (Attempt to Resuscitate)  Medical Interventions (Patient has pulse or is breathing): Full Support      Disposition:  I expect patient to be discharged to inpatient rehab in 2 to 3 days when cleared by neurosurgery    This patient has been examined wearing appropriate Personal Protective Equipment. 01/16/22      Electronically signed by Renetta Gomez MD, 01/16/22, 12:52 EST.  [facility] Hospitalist [facility]

## 2022-01-16 NOTE — PROGRESS NOTES
Neurosurgery Progress Note      Patient: Marjorie FLORENTINO    YOB: 1965    Medical Record Number: [id number]    Attending Physician: Renetta Gomez MD    Date of Admission: 1/11/2022  3:26 PM    Status Post: C3-C5 ACDF    Post Operative Day Number: 2    Admitting Dx: Back pain [M54.9]  Acute bilateral low back pain, unspecified whether sciatica present [M54.50]  Cervical myelopathy (HCC) [G95.9]    General Appearance:  56 y.o. female awake and alert ambulating back from bathroom with walker and nursing assistant.  She was able to sit in chair with no difficulty.  She reports expected incisional pain as well as still having modest amount of difficulty with swallowing and throat fullness.  She does think that the steroids have helped some.  She was able to eat a grilled cheese last evening.  She reports that most of her issues are with thin liquids it seems.  She is voiding with no issues.    Current Problem List:   Patient Active Problem List   Diagnosis   • Primary hypertension   • Encounter for general adult medical examination without abnormal findings   • Cervical myelopathy (HCC)   • Acute bilateral low back pain   • Urinary incontinence   • Depression   • Obesity (BMI 30-39.9)   • Intervertebral cervical disc disorder with myelopathy, cervical region           Current Medications:  Scheduled Meds:enoxaparin, 40 mg, Subcutaneous, Daily  lidocaine, 1 patch, Transdermal, Q24H  losartan, 25 mg, Oral, Daily  senna-docusate sodium, 2 tablet, Oral, BID  sertraline, 25 mg, Oral, Daily  sodium chloride, 10 mL, Intravenous, Q12H  sodium chloride, 10 mL, Intravenous, Q12H      Continuous Infusions:   PRN Meds:.•  acetaminophen  •  senna-docusate sodium **AND** polyethylene glycol **AND** bisacodyl **AND** bisacodyl  •  bisacodyl  •  cyclobenzaprine  •  docusate sodium  •  HYDROcodone-acetaminophen  •  HYDROmorphone **AND** naloxone  •  magnesium hydroxide  •  melatonin  •  ondansetron  •  sodium  chloride  •  sodium chloride      Diagnostic Tests:    Lab Results (last 24 hours)     Procedure Component Value Units Date/Time    Basic Metabolic Panel [[id number]]  (Abnormal) Collected: 01/16/22 0346    Specimen: Blood Updated: 01/16/22 0508     Glucose 125 mg/dL      BUN 13 mg/dL      Creatinine 0.49 mg/dL      Sodium 142 mmol/L      Potassium 4.4 mmol/L      Comment: Slight hemolysis detected by analyzer. Results may be affected.        Chloride 103 mmol/L      CO2 27.0 mmol/L      Calcium 9.4 mg/dL      eGFR Non African Amer 131 mL/min/1.73      BUN/Creatinine Ratio 26.5     Anion Gap 12.0 mmol/L     Narrative:      GFR Normal >60  Chronic Kidney Disease <60  Kidney Failure <15      CBC & Differential [[id number]]  (Abnormal) Collected: 01/16/22 0346    Specimen: Blood Updated: 01/16/22 0433    Narrative:      The following orders were created for panel order CBC & Differential.  Procedure                               Abnormality         Status                     ---------                               -----------         ------                     CBC Auto Differential[[id number]]        Abnormal            Final result                 Please view results for these tests on the individual orders.    CBC Auto Differential [[id number]]  (Abnormal) Collected: 01/16/22 0346    Specimen: Blood Updated: 01/16/22 0433     WBC 11.10 10*3/mm3      RBC 3.41 10*6/mm3      Hemoglobin 11.0 g/dL      Hematocrit 31.9 %      MCV 93.6 fL      MCH 32.2 pg      MCHC 34.4 g/dL      RDW 12.9 %      RDW-SD 42.4 fl      MPV 7.8 fL      Platelets 312 10*3/mm3      Neutrophil % 84.4 %      Lymphocyte % 7.9 %      Monocyte % 4.3 %      Eosinophil % 2.6 %      Basophil % 0.8 %      Neutrophils, Absolute 9.30 10*3/mm3      Lymphocytes, Absolute 0.90 10*3/mm3      Monocytes, Absolute 0.50 10*3/mm3      Eosinophils, Absolute 0.30 10*3/mm3      Basophils, Absolute 0.10 10*3/mm3      nRBC 0.0 /100 WBC           Imaging Results (Last 24  Hours)     ** No results found for the last 24 hours. **          Physical Exam:   General Appearance:  Alert, cooperative, in no acute distress   Head:  Normocephalic, without obvious abnormality, atraumatic   Neck:    Incision is well approximated with no drainage.  Mild swelling along anterior neck with no obvious fluid collection.  Trachea is midline   Abdomen:   nontender, nondistended   Extremities/MSK: Moves all extremities well, no edema, no cyanosis, no             Redness no sign of DVT   Skin: No bleeding, bruising or rash   Neurologic:  Alert and oriented x3  Moving all extremities no difficulty           Vitals:    01/15/22 2100 01/16/22 0021 01/16/22 0401 01/16/22 0932   BP: 149/67 146/83 130/75 138/85   BP Location: Right arm Right arm Right arm    Patient Position: Lying Lying Lying    Pulse: 88 81 81 75   Resp: 19  18    Temp: 97.9 °F (36.6 °C)  98.1 °F (36.7 °C)    TempSrc: Oral  Oral    SpO2: 94% 95% 94%    Weight:       Height:             Assessment: 56-year-old female with progressive myelopathy secondary to spinal canal stenosis.  She is postop day 2 from two-level anterior cervical discectomy and fusion.  Patient was ambulating back from bathroom when I entered the room and appears to have better control of her lower extremities.  Patient still complains of incisional pain and difficulty with fullness upon swallowing.  She reports some improvement with the steroids.   she has been utilizing ice packs and ice chips.   I did go ahead and reorder cervical x-rays as they have not been performed.    Plan:    Continue 1 more day of steroid  Continue Pain management efforts  Continue mobilization efforts  Continue incisional care  Continue DVT Prophylaxis    Discharge Plan: Pending clinical course likely rehab    Date: 1/16/2022    Tierra Mukherjee, APRN  09:39 EST

## 2022-01-16 NOTE — DISCHARGE PLACEMENT REQUEST
"Marjorie FLORENTINO (56 y.o. Female)             Date of Birth Social Security Number Address Home Phone MRN    1965  [id number] [place] [address] [phone] [id number]    [place] Marital Status             None        Admission Date Admission Type Admitting Provider Attending Provider Department, Room/Bed    22 Emergency Renetta Gomez MD Kapadia, Shefali A, MD [facility] SURGICAL INPATIENT,     Discharge Date Discharge Disposition Discharge Destination                         Attending Provider: Renetta Gomez MD    Allergies: Ibuprofen, Lisinopril    Isolation: None   Infection: None   Code Status: CPR   Advance Care Planning Activity    Ht: 177.8 cm (70\")   Wt: 95.3 kg (210 lb 3.2 oz)    Admission Cmt: None   Principal Problem: Cervical myelopathy (HCC) [G95.9]                 Active Insurance as of 2022     Primary Coverage     Payor Plan Insurance Group Employer/Plan Group    WORKERS COMPENSATION MISC WORKERS COMPENSATION      Coverage Address Coverage Phone Number Coverage Fax Number Effective Dates    [address] [address] [phone]  10/22/2021 - None Entered    [place] [address]       Subscriber Name Subscriber Birth Date Member ID       MARJORIE FLORENTINO 1965 [id number]                 Emergency Contacts      (Rel.) Home Phone Work Phone Mobile Phone    TAE CHONG (Daughter) -- -- [phone]               History & Physical      Feliberto Chong PA-C at 22 1349     Attestation signed by Issa Taylor MD at 22 1603    For this patient encounter, I reviewed the NP or PA documentation, treatment plan, and medical decision making.                   [facility]P    Patient Name: Marjorie FLORENTINO  : 1965  MRN: [id number]  Primary Care Physician: Lurdes West APRN  Date of admission: 2022     Patient Care Team:  Lurdes West APRN as PCP - General          Subjective   History " "Present Illness     Chief Complaint:   Chief Complaint   Patient presents with   • Back Pain       Obtained from ED provider HPI on 1/11/2022:  Patient is a 56-year-old female presents emergency department with complaint of back pain, and \"back seizing\".  Patient reports this became worse today.  She reports she had a fall in October, and has had multiple symptoms since that time, with continued back pain.  Patient reports she has had weakness in her hands, she urinates when she stands up, muscle spasms, and generally feeling weak.  Patient has been seen by her primary care provider, has had MRI of the brain, cervical spine and lumbar spine since her symptoms began.  She reports her symptoms have not changed, but her \"back seizing\" is what prompted her visit to the ED today.  She denies any other bowel or bladder symptoms.  No extremity weakness.     1/12/2022: Patient confirms the HPI noted above including several month history of worsening low back pain following a fall in October 2021 which seems to have become significantly worse over the past several weeks.  She notes pain in the lower midportion and right side of her back described as a \"seizing pain\" made somewhat worse with movement but no other obvious provoking or palliative factors.  Some associated bladder incontinence as well as gait abnormality because her \"feet keep dropping\" is also noted.  She denies any other recent trauma or falls though patient does report some bilateral distal paresthesias in all extremities.  She denies any dyspnea, nausea or vomiting, cough or fever or peripheral edema.  She confirms compliance all of her outpatient medical therapies.      ROS   Review of Systems   Constitutional: Negative.   HENT: Negative.    Eyes: Negative.    Cardiovascular: Negative.    Respiratory: Negative.    Endocrine: Negative.    Skin: Negative.    Musculoskeletal: Positive for back pain and falls.   Gastrointestinal: Negative.    Neurological: " Positive for loss of balance and paresthesias.        Gait abnormalities   Psychiatric/Behavioral: Negative.          Personal History     Past Medical History:   Past Medical History:   Diagnosis Date   • Hypertension        Surgical History:      Past Surgical History:   Procedure Laterality Date   •  SECTION      x3            Family History: family history includes COPD in her mother; Coronary artery disease in her mother; Hypertension in her father and sister; Kidney disease in her father; Stroke in her father, maternal grandmother, and paternal grandmother. Otherwise pertinent FHx was reviewed and unremarkable.     Social History:  reports that she has never smoked. She has never used smokeless tobacco. Drug use questions deferred to the physician. She reports that she does not drink alcohol.      Medications:  Prior to Admission medications    Medication Sig Start Date End Date Taking? Authorizing Provider   baclofen (LIORESAL) 10 MG tablet Take 1 tablet by mouth 3 (Three) Times a Day. 22   Lurdes West APRN   Cholecalciferol (Vitamin D) 50 MCG (2000 UT) capsule Take  by mouth Daily.    ProviderMonica MD   hydroCHLOROthiazide (HYDRODIURIL) 25 MG tablet TAKE ONE TABLET BY MOUTH DAILY 12/3/21   Lurdes West APRN   losartan (Cozaar) 25 MG tablet Take 1 tablet by mouth Daily. 21   Lurdes West APRN   magnesium gluconate (MAGONATE) 500 MG tablet Take 250 mg by mouth Daily.    ProviderMonica MD   sertraline (ZOLOFT) 50 MG tablet Take 25 mg by mouth Daily.    Monica Zarate MD   Turmeric (QC TUMERIC COMPLEX PO) Take  by mouth Daily.    ProviderMonica MD       Allergies:    Allergies   Allergen Reactions   • Ibuprofen Angioedema   • Lisinopril Hives       Objective   Objective     Vital Signs  Temp:  [97.9 °F (36.6 °C)-99.3 °F (37.4 °C)] 98.3 °F (36.8 °C)  Heart Rate:  [64-98] 97  Resp:  [18] 18  BP: (115-167)/(62-90) 120/74  SpO2:  [93 %-99 %] 97 %  on   ;    Device (Oxygen Therapy): room air  Body mass index is 30.13 kg/m².    Physical Exam  Physical Exam  Vitals reviewed.   Constitutional:       General: She is not in acute distress.     Appearance: Normal appearance. She is obese. She is not ill-appearing, toxic-appearing or diaphoretic.   HENT:      Head: Normocephalic and atraumatic.      Right Ear: External ear normal.      Nose: Nose normal.      Mouth/Throat:      Mouth: Mucous membranes are moist.   Eyes:      Extraocular Movements: Extraocular movements intact.   Cardiovascular:      Rate and Rhythm: Normal rate and regular rhythm.      Pulses: Normal pulses.      Heart sounds: Normal heart sounds.   Pulmonary:      Effort: Pulmonary effort is normal.      Breath sounds: Normal breath sounds.   Abdominal:      General: Bowel sounds are normal. There is no distension.      Palpations: Abdomen is soft.      Tenderness: There is no abdominal tenderness.   Musculoskeletal:         General: Normal range of motion.      Cervical back: Normal range of motion.      Right lower leg: No edema.      Left lower leg: No edema.   Skin:     General: Skin is warm and dry.      Capillary Refill: Capillary refill takes less than 2 seconds.   Neurological:      General: No focal deficit present.      Mental Status: She is alert and oriented to person, place, and time.      Motor: No weakness.      Comments: Patient appears to have equal strength bilaterally, gait assessment deferred until after neurosurgery evaluation   Psychiatric:         Mood and Affect: Mood normal.         Behavior: Behavior normal.         Thought Content: Thought content normal.         Judgment: Judgment normal.     Results Review:  I have personally reviewed most recent lab results and radiology images and interpretations and agree with findings, most notably: BMP, CBC, COVID-19 test and previous CT and MRI.    Results from last 7 days   Lab Units 01/12/22  0410   WBC 10*3/mm3 7.20   HEMOGLOBIN g/dL  12.2   HEMATOCRIT % 34.8   PLATELETS 10*3/mm3 342     Results from last 7 days   Lab Units 01/12/22  0410   SODIUM mmol/L 141   POTASSIUM mmol/L 4.5   CHLORIDE mmol/L 104   CO2 mmol/L 30.0*   BUN mg/dL 17   CREATININE mg/dL 0.83   GLUCOSE mg/dL 92   CALCIUM mg/dL 9.5     Estimated Creatinine Clearance: 95.8 mL/min (by C-G formula based on SCr of 0.83 mg/dL).  Brief Urine Lab Results  (Last result in the past 365 days)      Color   Clarity   Blood   Leuk Est   Nitrite   Protein   CREAT   Urine HCG        01/11/22 1732 Yellow   Clear   Negative   Negative   Negative   Negative                 Microbiology Results (last 10 days)     Procedure Component Value - Date/Time    COVID PRE-OP / PRE-PROCEDURE SCREENING ORDER (NO ISOLATION) - Swab, Nasopharynx [[id number]]  (Normal) Collected: 01/11/22 1828    Lab Status: Final result Specimen: Swab from Nasopharynx Updated: 01/11/22 1857    Narrative:      The following orders were created for panel order COVID PRE-OP / PRE-PROCEDURE SCREENING ORDER (NO ISOLATION) - Swab, Nasopharynx.  Procedure                               Abnormality         Status                     ---------                               -----------         ------                     COVID-19,CEPHEID/ZEYAD,CO...[[id number]]  Normal              Final result                 Please view results for these tests on the individual orders.    COVID-19,CEPHEID/ZEYAD,COR/VIOLETA/PAD/ELAYNE IN-HOUSE(OR EMERGENT/ADD-ON),NP SWAB IN TRANSPORT MEDIA 3-4 HR TAT, RT-PCR - Swab, Nasopharynx [[id number]]  (Normal) Collected: 01/11/22 1828    Lab Status: Final result Specimen: Swab from Nasopharynx Updated: 01/11/22 1857     COVID19 Not Detected    Narrative:      Fact sheet for providers: https://www.fda.gov/media/949565/download     Fact sheet for patients: https://www.fda.gov/media/229889/download  Fact sheet for providers: https://www.fda.gov/media/836881/download    Fact sheet for patients:  https://www.fda.gov/media/078870/download    Test performed by PCR.          ECG/EMG Results (most recent)     None                  No radiology results for the last 7 days      Estimated Creatinine Clearance: 95.8 mL/min (by C-G formula based on SCr of 0.83 mg/dL).    Assessment/Plan   Assessment/Plan       Active Hospital Problems    Diagnosis  POA   • Back pain [M54.9]  Yes      Resolved Hospital Problems   No resolved problems to display.     Back pain with some urinary incontinence and paresthesias  -BMP and CBC within normal limits  -UA shows 1+ ketones but is otherwise unremarkable  -Review of records from the ED notes Patient had CT cervical spine 11/6/2021. MRI brain impression normal for patient's age. MRI cervical spine no acute osseous abnormality. Multilevel degenerative changes throughout the spine with multilevel canal stenosis most right C4-C5. Varying degrees of neuroforaminal narrowing described in findings.Patient had MRI of the lumbar spine without contrast on 11/15/2021.  Which is after her fall and after her describes symptoms today began.  No acute abnormalities of lumbar spine such as osseous fracture contusion.  Impression as follows.IMPRESSION:  1. There are no acute abnormalities lumbar spine such as osseous fracture contusion.  2. There is moderate levoconvex curvature of the lumbar spine with degenerative disc disease and moderate to severe facet degenerative change.  3. There is severe neural foraminal narrowing on the left the L4-L5 level there is moderate bilateral osseous neural foraminal narrowing L3-L4 left greater than right. There is mild left neural foraminal narrowing L2-L3.  4. There is mild central narrowing left greater than right L3-L4.)  -Maintain falls precautions  -Morphine given in the ED, continue and add Lyrica and Lidoderm  -Neurosurgery consulted, repeat cervical MRI planned  -PT/OT consulted     Hypertension  -Well controlled with a most recent blood pressure of  115/71  - Continue losartan hydrochlorothiazide  - Monitor while admitted     Depression  -Zoloft     Obesity (BMI: 30.13)  -Encourage diet lifestyle modification            VTE Prophylaxis -   Mechanical Order History:      Ordered        01/11/22 1902  Place Sequential Compression Device  Once            01/11/22 1902  Maintain Sequential Compression Device  Continuous                    Pharmalogical Order History:     None          CODE STATUS:    Code Status and Medical Interventions:   Ordered at: 01/11/22 1800     Level Of Support Discussed With:    Patient     Code Status (Patient has no pulse and is not breathing):    CPR (Attempt to Resuscitate)     Medical Interventions (Patient has pulse or is breathing):    Full Support       This patient has been examined wearing personal protective equipment.     I discussed the patient's findings and my recommendations with patient and nursing staff.      Signature:Electronically signed by Feliberto Maldonado PA-C, 01/12/22, 2:54 PM EST.            Electronically signed by Issa Taylor MD at 01/12/22 4054          Physical Therapy Notes (last 24 hours)      Jesica Cope PTA at 01/16/22 1421  Version 1 of 1       Subjective: Pt agreeable to therapeutic plan of care.    Objective:     Bed mobility - N/A or Not attempted.  Transfers - CGA  Ambulation - 100 feet CGA and with rolling walker    Pain: 0 VAS  Education: Provided education on importance of mobility and skilled verbal / tactile cueing throughout intervention.     Assessment: Marjorie FLORENTINO presents with functional mobility impairments which indicate the need for skilled intervention. Pt up in chair at start of tx. Pt completed STS and short ambulatory tx w FWW and CGA to bathroom with CGA. Pt completed toilet tx with use of grab bar and SBA. Pt ilir standing at sink w/o UE support [id number] to complete hand hygiene. Pt amb within wilson with FW and CGA x100ft. Pt demo's decreased robyn and step length  noted. No LOB.  Tolerating session today without incident. Will continue to follow and progress as tolerated.     Plan/Recommendations:   Pt would benefit from Inpatient Rehabilitation placement at discharge from facility and requires no DME at discharge.   Pt desires Inpatient Rehabilitation placement at discharge. Pt cooperative; agreeable to therapeutic recommendations and plan of care.     Basic Mobility 6-click:  Rollin = Total, A lot = 2, A little = 3; 4 = None  Supine>Sit:   1 = Total, A lot = 2, A little = 3; 4 = None   Sit>Stand with arms:  1 = Total, A lot = 2, A little = 3; 4 = None  Bed>Chair:   1 = Total, A lot = 2, A little = 3; 4 = None  Ambulate in room:  1 = Total, A lot = 2, A little = 3; 4 = None  3-5 Steps with railin = Total, A lot = 2, A little = 3; 4 = None  Score: 16    Modified [place]: N/A = No pre-op stroke/TIA    Post-Tx Position: Up in Chair, Alarms activated and Call light and personal items within reach, family present  PPE: gloves, surgical mask, eyewear protection    Electronically signed by Jesica Cope PTA at 22 1434     Jesica Cope PTA at 22 1434  Version 1 of 1         Assessment: Marjorie FLORENTINO presents with functional mobility impairments which indicate the need for skilled intervention. Pt up in chair at start of tx. Pt completed STS and short ambulatory tx w FWW and CGA to bathroom with CGA. Pt completed toilet tx with use of grab bar and SBA. Pt ilir standing at sink w/o UE support [id number] to complete hand hygiene. Pt amb within wilson with FW and CGA x100ft. Pt demo's decreased robyn and step length noted. No LOB.  Tolerating session today without incident. Will continue to follow and progress as tolerated.     Plan/Recommendations:   Pt would benefit from Inpatient Rehabilitation placement at discharge from facility and requires no DME at discharge.   Pt desires Inpatient Rehabilitation placement at discharge. Pt cooperative; agreeable  to therapeutic recommendations and plan of care.     Electronically signed by Jesica Cope PTA at 01/16/22 6599

## 2022-01-16 NOTE — PLAN OF CARE
Assessment: Marjorie FLORENTINO presents with functional mobility impairments which indicate the need for skilled intervention. Pt up in chair at start of tx. Pt completed STS and short ambulatory tx w FWW and CGA to bathroom with CGA. Pt completed toilet tx with use of grab bar and SBA. Pt ilir standing at sink w/o UE support [id number] to complete hand hygiene. Pt amb within wilson with FW and CGA x100ft. Pt demo's decreased robyn and step length noted. No LOB.  Tolerating session today without incident. Will continue to follow and progress as tolerated.     Plan/Recommendations:   Pt would benefit from Inpatient Rehabilitation placement at discharge from facility and requires no DME at discharge.   Pt desires Inpatient Rehabilitation placement at discharge. Pt cooperative; agreeable to therapeutic recommendations and plan of care.

## 2022-01-16 NOTE — PLAN OF CARE
Pt resting. C/o pain tx per MAR. Pt still refusing most oral medications. This nurse attempted to administer one, small pill with applesauce and pt had difficulty. Pt ambulatory with use of walker and stand by assistance. Per PT, pt would benefit from IP rehab at d/c.  will hopefull see pt today and provide list of choices. VSS. Call light within reach; pt able to make needs known. Will continue to monitor.

## 2022-01-16 NOTE — PLAN OF CARE
Goal Outcome Evaluation:  Plan of Care Reviewed With: patient        Progress: improving  Outcome Summary: pt is stable and resting in bed. worked with PT and OT and has ambulated in the halls and in room. not able to swallow larger pills at this time and is using IV pain medication. will continue to monitor.

## 2022-01-16 NOTE — THERAPY TREATMENT NOTE
Subjective: Pt agreeable to therapeutic plan of care.    Objective:     Bed mobility - N/A or Not attempted.  Transfers - CGA  Ambulation - 100 feet CGA and with rolling walker    Pain: 0 VAS  Education: Provided education on importance of mobility and skilled verbal / tactile cueing throughout intervention.     Assessment: Marjorie FLORENTINO presents with functional mobility impairments which indicate the need for skilled intervention. Pt up in chair at start of tx. Pt completed STS and short ambulatory tx w FWW and CGA to bathroom with CGA. Pt completed toilet tx with use of grab bar and SBA. Pt ilir standing at sink w/o UE support [id number] to complete hand hygiene. Pt amb within wilson with FW and CGA x100ft. Pt demo's decreased robyn and step length noted. No LOB.  Tolerating session today without incident. Will continue to follow and progress as tolerated.     Plan/Recommendations:   Pt would benefit from Inpatient Rehabilitation placement at discharge from facility and requires no DME at discharge.   Pt desires Inpatient Rehabilitation placement at discharge. Pt cooperative; agreeable to therapeutic recommendations and plan of care.     Basic Mobility 6-click:  Rollin = Total, A lot = 2, A little = 3; 4 = None  Supine>Sit:   1 = Total, A lot = 2, A little = 3; 4 = None   Sit>Stand with arms:  1 = Total, A lot = 2, A little = 3; 4 = None  Bed>Chair:   1 = Total, A lot = 2, A little = 3; 4 = None  Ambulate in room:  1 = Total, A lot = 2, A little = 3; 4 = None  3-5 Steps with railin = Total, A lot = 2, A little = 3; 4 = None  Score: 16    Modified [place]: N/A = No pre-op stroke/TIA    Post-Tx Position: Up in Chair, Alarms activated and Call light and personal items within reach, family present  PPE: gloves, surgical mask, eyewear protection

## 2022-01-16 NOTE — PROGRESS NOTES
Discharge Planning Assessment   Emmanuel     Patient Name: Marjorie FLORENTINO  MRN: [id number]  Today's Date: 1/16/2022    Admit Date: 1/11/2022       Discharge Plan     Row Name 01/16/22 1829       Plan    Plan DC plan: referral to [facility]. Pending acceptance and precert.    Plan Comments PT recommending IP rehab. Pt agreeable. Referral made to [facility] via Epic and notified liaison. Will require precert. #2 choice [facility] if needed. Met with patient in room wearing PPE: mask, goggles. Maintained distance greater than six feet and spent less than 15 minutes in the room.              Continued Care and Services - Admitted Since 1/11/2022     Destination     Service Provider Request Status Selected Services Address Phone Fax Patient Preferred    [place] [facility] [place]  Pending - Request Sent N/A [address] [address] [phone] --          Durable Medical Equipment     Service Provider Request Status Selected Services Address Phone Fax Patient Preferred    [facility]  Accepted N/A [address] [address] [phone] --                  Ro Styles RN

## 2022-01-17 LAB
ANION GAP SERPL CALCULATED.3IONS-SCNC: 10 MMOL/L (ref 5–15)
BASOPHILS # BLD AUTO: 0 10*3/MM3 (ref 0–0.2)
BASOPHILS NFR BLD AUTO: 0.3 % (ref 0–1.5)
BUN SERPL-MCNC: 19 MG/DL (ref 6–20)
BUN/CREAT SERPL: 38 (ref 7–25)
CALCIUM SPEC-SCNC: 9.1 MG/DL (ref 8.6–10.5)
CHLORIDE SERPL-SCNC: 106 MMOL/L (ref 98–107)
CO2 SERPL-SCNC: 27 MMOL/L (ref 22–29)
CREAT SERPL-MCNC: 0.5 MG/DL (ref 0.57–1)
DEPRECATED RDW RBC AUTO: 43.3 FL (ref 37–54)
EOSINOPHIL # BLD AUTO: 0 10*3/MM3 (ref 0–0.4)
EOSINOPHIL NFR BLD AUTO: 0 % (ref 0.3–6.2)
ERYTHROCYTE [DISTWIDTH] IN BLOOD BY AUTOMATED COUNT: 13.1 % (ref 12.3–15.4)
GFR SERPL CREATININE-BSD FRML MDRD: 128 ML/MIN/1.73
GLUCOSE SERPL-MCNC: 121 MG/DL (ref 65–99)
HCT VFR BLD AUTO: 31.3 % (ref 34–46.6)
HGB BLD-MCNC: 10.7 G/DL (ref 12–15.9)
LYMPHOCYTES # BLD AUTO: 0.9 10*3/MM3 (ref 0.7–3.1)
LYMPHOCYTES NFR BLD AUTO: 9.8 % (ref 19.6–45.3)
MCH RBC QN AUTO: 32 PG (ref 26.6–33)
MCHC RBC AUTO-ENTMCNC: 34.1 G/DL (ref 31.5–35.7)
MCV RBC AUTO: 94 FL (ref 79–97)
MONOCYTES # BLD AUTO: 0.5 10*3/MM3 (ref 0.1–0.9)
MONOCYTES NFR BLD AUTO: 5.4 % (ref 5–12)
NEUTROPHILS NFR BLD AUTO: 7.4 10*3/MM3 (ref 1.7–7)
NEUTROPHILS NFR BLD AUTO: 84.5 % (ref 42.7–76)
NRBC BLD AUTO-RTO: 0.1 /100 WBC (ref 0–0.2)
PLATELET # BLD AUTO: 305 10*3/MM3 (ref 140–450)
PMV BLD AUTO: 7.1 FL (ref 6–12)
POTASSIUM SERPL-SCNC: 4.1 MMOL/L (ref 3.5–5.2)
RBC # BLD AUTO: 3.33 10*6/MM3 (ref 3.77–5.28)
SODIUM SERPL-SCNC: 143 MMOL/L (ref 136–145)
WBC NRBC COR # BLD: 8.8 10*3/MM3 (ref 3.4–10.8)

## 2022-01-17 PROCEDURE — 25010000002 HYDROMORPHONE PER 4 MG

## 2022-01-17 PROCEDURE — 80048 BASIC METABOLIC PNL TOTAL CA: CPT | Performed by: INTERNAL MEDICINE

## 2022-01-17 PROCEDURE — 99232 SBSQ HOSP IP/OBS MODERATE 35: CPT | Performed by: INTERNAL MEDICINE

## 2022-01-17 PROCEDURE — 25010000002 ENOXAPARIN PER 10 MG

## 2022-01-17 PROCEDURE — 97116 GAIT TRAINING THERAPY: CPT

## 2022-01-17 PROCEDURE — 92610 EVALUATE SWALLOWING FUNCTION: CPT

## 2022-01-17 PROCEDURE — 25010000002 DEXAMETHASONE PER 1 MG: Performed by: NURSE PRACTITIONER

## 2022-01-17 PROCEDURE — 97530 THERAPEUTIC ACTIVITIES: CPT

## 2022-01-17 PROCEDURE — 85025 COMPLETE CBC W/AUTO DIFF WBC: CPT | Performed by: INTERNAL MEDICINE

## 2022-01-17 RX ORDER — HYDROCHLOROTHIAZIDE 25 MG/1
25 TABLET ORAL DAILY
Status: DISCONTINUED | OUTPATIENT
Start: 2022-01-17 | End: 2022-01-21 | Stop reason: HOSPADM

## 2022-01-17 RX ADMIN — HYDROMORPHONE HYDROCHLORIDE 0.5 MG: 2 INJECTION, SOLUTION INTRAMUSCULAR; INTRAVENOUS; SUBCUTANEOUS at 23:03

## 2022-01-17 RX ADMIN — LOSARTAN POTASSIUM 25 MG: 25 TABLET, FILM COATED ORAL at 10:01

## 2022-01-17 RX ADMIN — LIDOCAINE 1 PATCH: 50 PATCH TOPICAL at 23:03

## 2022-01-17 RX ADMIN — HYDROMORPHONE HYDROCHLORIDE 0.5 MG: 2 INJECTION, SOLUTION INTRAMUSCULAR; INTRAVENOUS; SUBCUTANEOUS at 20:31

## 2022-01-17 RX ADMIN — DEXAMETHASONE SODIUM PHOSPHATE 4 MG: 4 INJECTION, SOLUTION INTRA-ARTICULAR; INTRALESIONAL; INTRAMUSCULAR; INTRAVENOUS; SOFT TISSUE at 04:54

## 2022-01-17 RX ADMIN — SODIUM CHLORIDE, PRESERVATIVE FREE 10 ML: 5 INJECTION INTRAVENOUS at 17:21

## 2022-01-17 RX ADMIN — HYDROMORPHONE HYDROCHLORIDE 0.5 MG: 2 INJECTION, SOLUTION INTRAMUSCULAR; INTRAVENOUS; SUBCUTANEOUS at 10:03

## 2022-01-17 RX ADMIN — HYDROMORPHONE HYDROCHLORIDE 0.5 MG: 2 INJECTION, SOLUTION INTRAMUSCULAR; INTRAVENOUS; SUBCUTANEOUS at 04:54

## 2022-01-17 RX ADMIN — HYDROCHLOROTHIAZIDE 25 MG: 25 TABLET ORAL at 10:01

## 2022-01-17 RX ADMIN — HYDROMORPHONE HYDROCHLORIDE 0.5 MG: 2 INJECTION, SOLUTION INTRAMUSCULAR; INTRAVENOUS; SUBCUTANEOUS at 02:03

## 2022-01-17 RX ADMIN — SODIUM CHLORIDE, PRESERVATIVE FREE 10 ML: 5 INJECTION INTRAVENOUS at 23:12

## 2022-01-17 RX ADMIN — SERTRALINE 25 MG: 25 TABLET, FILM COATED ORAL at 10:01

## 2022-01-17 RX ADMIN — SODIUM CHLORIDE, PRESERVATIVE FREE 10 ML: 5 INJECTION INTRAVENOUS at 10:04

## 2022-01-17 RX ADMIN — SODIUM CHLORIDE, PRESERVATIVE FREE 10 ML: 5 INJECTION INTRAVENOUS at 17:34

## 2022-01-17 RX ADMIN — ENOXAPARIN SODIUM 40 MG: 40 INJECTION SUBCUTANEOUS at 17:21

## 2022-01-17 RX ADMIN — HYDROMORPHONE HYDROCHLORIDE 0.5 MG: 2 INJECTION, SOLUTION INTRAMUSCULAR; INTRAVENOUS; SUBCUTANEOUS at 14:56

## 2022-01-17 RX ADMIN — SODIUM CHLORIDE, PRESERVATIVE FREE 10 ML: 5 INJECTION INTRAVENOUS at 20:31

## 2022-01-17 NOTE — PLAN OF CARE
Pt resting. C/o pain tx per MAR. Pt's voice less hoarse today; however, pt still reports difficulty swallowing. PT recommending IP rehab at d/c; acceptance and precert pending to [facility]. Call light within reach; pt able to make needs known. Will continue to monitor.

## 2022-01-17 NOTE — PROGRESS NOTES
Subjective:  S/p ACDF with Dr. Meyers.   Patient continues to have swallowing difficulty though she states it might be feeling a little bit better.  Patient to have swallow eval tomorrow.  She does state her swallowing issues for first noticed a few days before surgery.  Incision is clean and dry no signs of infection.  Little to no improvement subjectively in her upper extremity symptoms.    Objective:  Slightly improved  strength  Positive Мария bilaterally.  Left > right  Negative clonus  Normal sensation throughout  Incision area nontender, no fluid palpable around incision    Assessment/plan:  Patient's swallowing difficulties could be due to prevertebral edema seen on x-ray, though she does report some swallowing difficulties beginning a few days before surgery.  Mild improvement after course of steroids given over the weekend.  We will continue to monitor.  Swallow eval tomorrow.  Patient likely planning for inpatient rehab.    - Neurosurgery will continue to monitor  - Medical management per primary team  - Call with any questions or concerns    I discussed my assessment recommendation with Dr. Mira Shin PA-C

## 2022-01-17 NOTE — THERAPY TREATMENT NOTE
Subjective: Pt agreeable to therapeutic plan of care.    Objective:     Bed mobility - Modified-Independent w/ HOB elevated  Transfers - CGA and with rolling walker STS from EOB x6  Ambulation - 130 feet CGA and with rolling walker demonstrates decreased gait speed, anxious state, BLE knee flexion, decreased heel strike (v/c to improve heel strike).    Pain: 3 VAS  Education: Provided education on importance of mobility and skilled verbal / tactile cueing throughout intervention.     Assessment: Marjorie FLORENTINO presents with functional mobility impairments which indicate the need for skilled intervention. Tolerating session today without incident. Pt completed STS x5 from EOB w/ CGA for BLE strengthening and ambulating 130ft w/ close CGA for safety. Pt demonstrated ability to tolerate standing following ambulation x3-5 minutes when waiting on maintenance. Will continue to follow and progress as tolerated.     Plan/Recommendations:   Pt would benefit from Inpatient Rehabilitation placement at discharge from facility and requires no DME at discharge.   Pt desires Inpatient Rehabilitation placement at discharge. Pt cooperative; agreeable to therapeutic recommendations and plan of care.     Basic Mobility 6-click:  Rollin = Total, A lot = 2, A little = 3; 4 = None  Supine>Sit:   1 = Total, A lot = 2, A little = 3; 4 = None   Sit>Stand with arms:  1 = Total, A lot = 2, A little = 3; 4 = None  Bed>Chair:   1 = Total, A lot = 2, A little = 3; 4 = None  Ambulate in room:  1 = Total, A lot = 2, A little = 3; 4 = None  3-5 Steps with railin = Total, A lot = 2, A little = 3; 4 = None  Score: 16    Post-Tx Position: Supine with HOB Elevated, Alarms activated and Call light and personal items within reach  PPE: gloves, surgical mask, eyewear protection

## 2022-01-17 NOTE — OP NOTE
Neurosurgical operative report:    Patient name: Marjorie Charles  MRN: [id number]  Date of procedure: January 14, 2022    Preoperative diagnosis: C3-C4-C5 herniated cervical disc causing spinal cord compression and cervical myelopathy    Postoperative diagnosis: C3-C4-C5 herniated cervical disc causing spinal cord compression and cervical myelopathy    Procedure: C3-C4-C5 anterior cervical discectomy and fusion, intraoperative microscope, intraoperative electrophysiologic neuromonitoring    Surgeon: Dr. Max Meyers  Assistant: Estuardo Shin,  Anesthesia: General endotracheal anesthetic  Specimens: None  Blood loss: 25 cc  Drains: None  Complications: None    Indications: This is a 56-year-old woman who presented to the emergency department with complaints of neck and back pain as well as hand and leg weakness and urinary incontinence.  She did suffer a fall in October and her symptoms initiated with this.  Upon close discussion with her she had signs and symptoms of myelopathy acute including difficulty with manual dexterity, Мария's and significant balance issues.  Her imaging was concerning for significant spinal canal stenosis at C3-C4 and C4-C5.  After explanation with her about the pathology and its connection to her myelopathic symptoms we offered surgical decompression.  After explanation of the risks and benefits she elected to proceed.    Description of procedure:  The patient was seen and examined in the preoperative holding area via name and medical record number.  Her history, physical exam, consent were all reviewed and found to be correct and appropriate for proceeding with surgery.  She was marked of the intended surgical site.  She was brought back to the operating room and handed over to anesthesia for induction of general endotracheal anesthetic.  She was transferred from the hospital bed to the operating room table in the supine position.  Baseline motor and sensory neuro monitoring status was  established.  Shoulder bump was placed and incision was marked out based on anatomic and radiographic guidance.  Post positioning neuro monitoring status was stable.  At this time the surgeon scrubbed and the area was prepped and draped in the usual sterile fashion.  Timeout was performed and all categories were found to be correct and appropriate for proceeding with surgery.  Local anesthetic was instilled.  Incision was made with 15 blade and carried through skin and subcutaneous tissue.  Supraplatysmal dissection was accomplished with Metzenbaum scissors.  The platysma was coagulated in a longitudinal fashion and opened up with Metzenbaum scissors.  Subplatysmal dissection was accomplished with Metzenbaum scissors and digital dissection.  The sternocleidomastoid and strap muscles were identified and a plane between them was created.  The trachea and esophagus were confirmed to be midline and the carotid was confirmed to be lateral to this dissection plane.  Hand-held retractors were brought into provide adequate visualization and the anterior vertebral body was palpated.  Kitners were utilized to clear off the prevertebral fascia.  A spinal needle was placed into the intervertebral disc and this was confirmed to be the proper level.  The location was marked with Bovie and the spinal needle was removed.  The anterior vertebral body was cleared off with Bovie electrocautery at C3, C4, C5.  Longus coli muscle was taken down bilaterally and self-retaining retractors were brought into provide adequate visualization.  [place] pins were placed at the C3 and C4 vertebral levels under radiographic guidance.  The vertebral bodies were distracted and microscope was brought on the field.  Annulus was entered with an 11 blade and discectomy was performed with multiple grasping instruments.  This was carried down to the posterior longitudinal ligament.  Sharp nerve hook was utilized to create a plane and Kerrison bone punches  were utilized to open up the posterior longitudinal ligament.  Curettes were utilized to prep the endplates bilaterally and confirm adequate decompression centrally.  Blunt nerve hook was utilized to assist in confirmation of decompression centrally as well as in the lateral recess.  A 7 mm 7 degree lordotic cage was deemed to be appropriate size and packed with I factor.  It was placed in the intervertebral space and distraction was removed.  This was placed under radiographic guidance.  The C3 distraction pin was removed and hemostasis was obtained.  The self-retaining retractors were repositioned to provide adequate visualization at the lower level.  The [place] pin was replaced at the C5 level and distraction across this joint was accomplished.  Discectomy and placement of the cage was accomplished with the same technique and at this level an 8 mm 7 degree lordotic cage was deemed to be appropriate in size.  It was also packed with I factor.  It was placed under radiographic guidance as well.  The distraction pins were removed and hemostasis was obtained.  The wound was copiously irrigated.  34 mm extend the plate was deemed to be appropriate size and positioned properly under radiographic guidance over the disc spaces.  This was secured with self drilling 12 mm screws and they were locked with the locking mechanism.  The self-retaining retractors were removed and the wound was copiously irrigated.  Hemostasis was obtained.  The platysma was closed with interrupted 3-0 Vicryl stitches.  The subcutaneous tissue was closed with interrupted 3-0 Vicryl stitches.  The skin was closed with a running Monocryl and the skin was glued.  This was covered with a dressing.  The drapes were taken down and the patient was extubated intraoperatively.  She was transferred back to the hospital bed and transported to the postanesthesia care unit.  At the time of her being dropped off at that location no complications were evident.   At the end of the case all counts were found to be correct.  I was present and scrubbed for all key portions of the procedure.  There were no changes in motor or sensory exam throughout the entirety of the case.  Estuardo Shin was present and scrubbed for the entirety of the case and essential for successful completion of the case.  End of dictation.  Thank very much.

## 2022-01-17 NOTE — PLAN OF CARE
Goal Outcome Evaluation:  Pt is a 57 y/o female who presented to [facility] on 1/11 with c/o back pain and decreased ability to ambulate. Pt found with severe stenosis and underwent C3-5 ant cervical discectomy and fusion on 1/14. Pt with c/o of difficulty swallowing post op. Pt reports she has the most difficulty with liquids and has been mostly eating ice cream and pudding.    Pt observed with trials of ice chips, water by all presentations, and NTL by straw. Pt self fed and independently took small sips. Oral phase characterized by adequate labial seal with no anterior loss. Timely oral transit. Pharyngeal phase characterized by intermittent cough reaction with ice chips and NTL by straw and cough reaction with all trials of thin liquids requiring intermittent suctioning. Pt's vocal quality appears weak but clear. D/t s/s of aspiration at bedside, recommend pt be NPO and participate in a VFSS tomorrow AM to objectively assess swallow function and determine safest and L/R diet.

## 2022-01-17 NOTE — PROGRESS NOTES
[facility] Medicine Services Daily Progress Note    Patient Name: Marjorie FLORENTINO  : 1965  MRN: [id number]  Primary Care Physician:  Lurdes West APRN  Date of admission: 2022      Subjective      Chief Complaint: Back pain    HPI:    Marjorie FLORENTINO is a 56 y.o. female with a history of HTN and depression who presented to the ER at [facility] on 2022 complaining of back pain, muscle spasms, urinary incontinence, and weakness. Workup in the ER was unremarkable. She was admitted to the ED Observation Unit.     22:  Neurosurgery was consulted and ordered MRI cervical spine to assess cervical myelopathy.     22:  The patient's MRI was abnormal and neurosurgery recommends surgical intervention. She was admitted to the Hospitalist group for further treatment.    22: Patient reports adequate pain control and denies specific complaints other than some difficulty swallowing and clearing her secretions and she has a suction that she is using that helps.  Neurosurgery-May start chemical DVT prophylaxis tomorrow.    2022: Patient seen and examined.  She continues to complain of difficulty swallowing and pain with swallowing.  She did use the ice pack as recommended but all the ice has melted.  Otherwise she states no new complaints.    1/15/2022: Patient seen and examined.  She continues to have difficulty swallowing mostly with liquids, but she has been able to eat soft foods.  Per discussion with nurse, patient is hesitant to do much on her own and has family assisting her more than required.      2022: Patient seen examined this morning.  This my first time seeing the patient, still having some trouble swallowing but able to take her pills okay.  No other complaints.  Awaiting rehab placement.    Denies fever, chills, chest pain, shortness of breath, abdominal pain, nausea, vomiting, diarrhea, dysuria, or dizziness.    Objective      Vitals:  "  Temp:  [97.8 °F (36.6 °C)-97.9 °F (36.6 °C)] 97.9 °F (36.6 °C)  Heart Rate:  [78-81] 81  Resp:  [18] 18  BP: (147-166)/(68-79) 166/68    Physical Exam   General: Awake, alert, NAD  Eyes: PERRL, EOMI, conjunctive are clear  Cardiovascular: Regular rate and rhythm, no murmurs  Respiratory: Clear to auscultation bilaterally, no wheezing or rales, unlabored breathing  Abdomen: Soft, nontender, positive bowel sounds, no guarding  Neurologic: A&O, CN grossly intact, moves all extremities spontaneously  Musculoskeletal: Generalized weakness noted, no deformities  Skin: Warm, dry, intact        Result Review    Result Review:  I have personally reviewed the results from the time of this admission to 1/17/2022 12:47 EST and agree with these findings:  [x]  Laboratory  [x]  Microbiology  [x]  Radiology  [x]  EKG/Telemetry   [x]  Cardiology/Vascular   []  Pathology  [x]  Old records  []  Other:      Wounds (last 24 hours)     LDA Wound     Row Name 01/16/22 1919             Wound 01/14/22 0847 Right neck Incision    Wound - Properties Group Placement Date: 01/14/22  -LT Placement Time: 0847  -LT Side: Right  -LT Location: neck  -LT Primary Wound Type: Incision  -LT      Dressing Appearance no drainage; open to air  -MT      Closure Liquid skin adhesive  -MT      Base clean; dry; red; maroon/purple  -MT      Drainage Amount none  -MT      Retired Wound - Properties Group Date first assessed: 01/14/22  -LT Time first assessed: 0847  -LT Side: Right  -LT Location: neck  -LT Primary Wound Type: Incision  -LT            User Key  (r) = Recorded By, (t) = Taken By, (c) = Cosigned By    Initials Name Provider Type    LT Elizabeth Clancy, RN Registered Nurse    Inez Thomas RN Registered Nurse                  Assessment/Plan      Brief Patient Summary:  Marjorie FLORENTINO is a 56 y.o. female who presents emergency department with complaint of back pain, and \"back seizing\" that has gradually worsened since she had a fall in October " 2021.  Patient reports she has had weakness in her hands, and she urinates when she stands up.  She complains of muscle spasms, and generally feeling weak.  Patient has been seen by her primary care provider, has had MRI of the brain, cervical spine and lumbar spine since her symptoms began. She denies any other bowel or bladder symptoms.    The patient underwent cervical discectomy and fusion via anterior approach on January 14, 2022.  Postoperatively PT has been working with her and are currently recommending inpatient rehab at discharge.  Patient is agreeable.         enoxaparin, 40 mg, Subcutaneous, Daily  hydroCHLOROthiazide, 25 mg, Oral, Daily  lidocaine, 1 patch, Transdermal, Q24H  losartan, 25 mg, Oral, Daily  senna-docusate sodium, 2 tablet, Oral, BID  sertraline, 25 mg, Oral, Daily  sodium chloride, 10 mL, Intravenous, Q12H  sodium chloride, 10 mL, Intravenous, Q12H             Active Hospital Problems:  Active Hospital Problems    Diagnosis    • **Cervical myelopathy (HCC)    • Acute bilateral low back pain    • Urinary incontinence    • Obesity (BMI 30-39.9)    • Depression    • Intervertebral cervical disc disorder with myelopathy, cervical region      Added automatically from request for surgery [id number]     • Primary hypertension      Plan:   Acute bilateral low back pain with Urinary incontinence  Cervical myelopathy   -Status post anterior cervical discectomy and fusion 1/14/2022  -Continue pain medication  -PT following, recommending inpatient rehab at discharge  -encourage IS  -Medically stable for discharge to rehab    Dysphagia  -Likely related to postop changes  -ST eval     Primary hypertension, chronic  -continue losartan and HCTZ  -monitor BP and vitals     Depression  -continue Zoloft      Obesity  -BMI 30.13 on admission  -encourage lifestyle modifications     DVT prophylaxis  -Lovenox    CODE STATUS:    Level Of Support Discussed With: Patient  Code Status (Patient has no pulse and is not  breathing): CPR (Attempt to Resuscitate)  Medical Interventions (Patient has pulse or is breathing): Full Support      Disposition: Patient is medically stable for discharge.  Awaiting placement.    This patient has been examined wearing appropriate Personal Protective Equipment. 01/17/22      Electronically signed by Rose Sebastian DO, 01/17/22, 12:47 EST.  [facility] Hospitalist Team

## 2022-01-17 NOTE — PLAN OF CARE
Assessment: Marjorie FLORENTINO presents with functional mobility impairments which indicate the need for skilled intervention. Tolerating session today without incident. Pt completed STS x5 from EOB w/ CGA for BLE strengthening and ambulating 130ft w/ close CGA for safety. Pt demonstrated ability to tolerate standing following ambulation x3-5 minutes when waiting on maintenance. Will continue to follow and progress as tolerated.

## 2022-01-17 NOTE — THERAPY EVALUATION
Acute Care - Speech Language Pathology   Swallow Initial Evaluation [facility]     Patient Name: Marjorie FLORENTINO  : 1965  MRN: [id number]  Today's Date: 2022               Admit Date: 2022    Visit Dx:     ICD-10-CM ICD-9-CM   1. Acute bilateral low back pain, unspecified whether sciatica present  M54.50 724.2   2. Intervertebral cervical disc disorder with myelopathy, cervical region  M50.00 722.71     Patient Active Problem List   Diagnosis   • Primary hypertension   • Encounter for general adult medical examination without abnormal findings   • Cervical myelopathy (HCC)   • Acute bilateral low back pain   • Urinary incontinence   • Depression   • Obesity (BMI 30-39.9)   • Intervertebral cervical disc disorder with myelopathy, cervical region     Past Medical History:   Diagnosis Date   • Depression    • Hypertension      Past Surgical History:   Procedure Laterality Date   • ANTERIOR CERVICAL DISCECTOMY W/ FUSION Bilateral 2022    Procedure: CERVICAL DISCECTOMY ANTERIOR WITH FUSION C3-C5;  Surgeon: Max Meyers MD;  Location: [facility];  Service: Neurosurgery;  Laterality: Bilateral;   •  SECTION      x3        SLP Recommendation and Plan  SLP Swallowing Diagnosis: functional oral phase, suspected pharyngeal dysphagia (22 1400)  SLP Diet Recommendation: NPO (22)     SLP Rec. for Method of Medication Administration: meds via alternate route (22)        Recommended Diagnostics: VFSS (MBS) (22 1400)  Swallow Criteria for Skilled Therapeutic Interventions Met: demonstrates skilled criteria (22 1400)     Rehab Potential/Prognosis, Swallowing: good, to achieve stated therapy goals (22 1400)  Therapy Frequency (Swallow): PRN (22 1400)  Predicted Duration Therapy Intervention (Days): until discharge (22 1400)        Patient was not wearing a face mask during this therapy encounter. Therapist used appropriate personal protective  Subjective:       Patient ID: Silvia Alvarado equipment including mask, eye protection and gloves.  Mask used was standard procedure mask. Appropriate PPE was worn during the entire therapy session. Hand hygiene was completed before and after therapy session. Patient is not in enhanced droplet precautions.           SWALLOW EVALUATION (last 72 hours)     SLP Adult Swallow Evaluation     Row Name 01/17/22 1400       Rehab Evaluation    Document Type evaluation  -LF    Subjective Information no complaints  -LF    Patient Observations alert; cooperative; agree to therapy  -LF    Patient Effort good  -LF    Symptoms Noted During/After Treatment none  -LF            General Information    Patient Profile Reviewed yes  -LF    Pertinent History Of Current Problem Pt is a 57 y/o female who presented to [facility] on 1/11 with c/o back pain and decreased ability to ambulate. Pt found with severe stenosis and underwent C3-5 ant cervical discectomy and fusion on 1/14. Pt with c/o of difficulty swallowing post op. Pt reports she has the most difficulty with liquids and has been mostly eating ice cream and pudding.   -LF    Current Method of Nutrition regular textures; thin liquids  -LF    Prior Level of Function-Swallowing no diet consistency restrictions  -LF    Plans/Goals Discussed with patient; agreed upon  -LF            Oral Motor Structure and Function    Dentition Assessment natural, present and adequate  -LF    Secretion Management WNL/WFL  -LF    Mucosal Quality moist, healthy  -LF            Oral Musculature and Cranial Nerve Assessment    Oral Motor General Assessment WFL  -LF            General Eating/Swallowing Observations    Respiratory Support Currently in Use room air  -LF    Eating/Swallowing Skills self-fed; appropriate self-feeding skills observed  -LF    Positioning During Eating upright 90 degree; upright in bed  -LF    Utensils Used spoon; straw  -LF    Consistencies Trialed ice chips; thin liquids; nectar/syrup-thick liquids  -LF            Clinical  Swallow Eval    Oral Prep Phase WFL  -LF    Oral Transit WFL  -LF    Oral Residue WFL  -LF    Pharyngeal Phase suspected pharyngeal impairment  -    Clinical Swallow Evaluation Summary Pt observed with trials of ice chips, water by all presentations, and NTL by straw. Pt self fed and independently took small sips. Oral phase characterized by adequate labial seal with no anterior loss. Timely oral transit. Pharyngeal phase characterized by intermittent cough reaction with ice chips and NTL by straw and cough reaction with all trials of thin liquids requiring intermittent suctioning. Pt's vocal quality appears weak but clear. Recommend pt be NPO and participate in a VFSS tomorrow AM to objectively assess swallow function and determine safest and L/R diet.   -LF            Pharyngeal Phase Concerns    Pharyngeal Phase Concerns cough  -LF    Cough all consistencies  -LF            SLP Evaluation Clinical Impression    SLP Swallowing Diagnosis functional oral phase; suspected pharyngeal dysphagia  -LF    Functional Impact risk of aspiration/pneumonia; risk of malnutrition; risk of dehydration  -    Rehab Potential/Prognosis, Swallowing good, to achieve stated therapy goals  -    Swallow Criteria for Skilled Therapeutic Interventions Met demonstrates skilled criteria  -LF            SLP Treatment Clinical Impressions    Care Plan Review evaluation/treatment results reviewed; risks/benefits reviewed; care plan/treatment goals reviewed; current/potential barriers reviewed; patient/other agree to care plan  -            Recommendations    Therapy Frequency (Swallow) PRN  -LF    Predicted Duration Therapy Intervention (Days) until discharge  -    SLP Diet Recommendation NPO  -LF    Recommended Diagnostics VFSS (MBS)  -    Oral Care Recommendations Oral Care BID/PRN  -LF    SLP Rec. for Method of Medication Administration meds via alternate route  -LF            Swallow Goals (SLP)    Oral Nutrition/Hydration Goal  Selection (SLP) oral nutrition/hydration, SLP goal 1; oral nutrition/hydration, SLP goal 2  -LF            Oral Nutrition/Hydration Goal 1 (SLP)    Oral Nutrition/Hydration Goal 1, SLP The patient will maximize swallow function for least restrictive po diet, exhibiting no complications associated with dysphagia, adequate po intake, and demonstrating independent use of safe swallow compensations  -LF    Time Frame (Oral Nutrition/Hydration Goal 1, SLP) by discharge  -LF            Oral Nutrition/Hydration Goal 2 (SLP)    Oral Nutrition/Hydration Goal 2, SLP The patient will participate in ongoing assessment of swallow, including re-evaluation clinically and/or including instrumental assessment of swallow if indicated, to further assess swallow function in anticipation to initiate a po diet  -LF    Time Frame (Oral Nutrition/Hydration Goal 2, SLP) 1 day  -LF          User Key  (r) = Recorded By, (t) = Taken By, (c) = Cosigned By    Initials Name Effective Dates    LF Vanessa Ko, ANGEL 06/16/21 -                 EDUCATION  The patient has been educated in the following areas:   Dysphagia (Swallowing Impairment) Oral Care/Hydration NPO rationale.        SLP GOALS     Row Name 01/17/22 1400       Oral Nutrition/Hydration Goal 1 (SLP)    Oral Nutrition/Hydration Goal 1, SLP The patient will maximize swallow function for least restrictive po diet, exhibiting no complications associated with dysphagia, adequate po intake, and demonstrating independent use of safe swallow compensations  -LF    Time Frame (Oral Nutrition/Hydration Goal 1, SLP) by discharge  -LF            Oral Nutrition/Hydration Goal 2 (SLP)    Oral Nutrition/Hydration Goal 2, SLP The patient will participate in ongoing assessment of swallow, including re-evaluation clinically and/or including instrumental assessment of swallow if indicated, to further assess swallow function in anticipation to initiate a po diet  -LF    Time Frame (Oral  daily. for 10 days  Dispense: 40 tablet; Refill: 0  -     Discontinue: meloxicam (MOBIC) 15 MG tablet; Take 1 tablet (15 mg total) by mouth once daily.  Dispense: 30 tablet; Refill: 5  -     meloxicam (MOBIC) 15 MG tablet; Take 1 tablet (15 mg total) by mouth daily as needed for Pain.  Dispense: 30 tablet; Refill: 0       Nutrition/Hydration Goal 2, SLP) 1 day  -          User Key  (r) = Recorded By, (t) = Taken By, (c) = Cosigned By    Initials Name Provider Type    LF Vanessa Ko, SLP Speech and Language Pathologist                   Time Calculation:                ANEGL Holman  1/17/2022

## 2022-01-18 ENCOUNTER — APPOINTMENT (OUTPATIENT)
Dept: GENERAL RADIOLOGY | Facility: HOSPITAL | Age: 57
End: 2022-01-18

## 2022-01-18 LAB
ANION GAP SERPL CALCULATED.3IONS-SCNC: 11 MMOL/L (ref 5–15)
BASOPHILS # BLD AUTO: 0 10*3/MM3 (ref 0–0.2)
BASOPHILS NFR BLD AUTO: 0.6 % (ref 0–1.5)
BUN SERPL-MCNC: 21 MG/DL (ref 6–20)
BUN/CREAT SERPL: 37.5 (ref 7–25)
CALCIUM SPEC-SCNC: 9.2 MG/DL (ref 8.6–10.5)
CHLORIDE SERPL-SCNC: 104 MMOL/L (ref 98–107)
CO2 SERPL-SCNC: 29 MMOL/L (ref 22–29)
CREAT SERPL-MCNC: 0.56 MG/DL (ref 0.57–1)
DEPRECATED RDW RBC AUTO: 42.9 FL (ref 37–54)
EOSINOPHIL # BLD AUTO: 0.1 10*3/MM3 (ref 0–0.4)
EOSINOPHIL NFR BLD AUTO: 0.7 % (ref 0.3–6.2)
ERYTHROCYTE [DISTWIDTH] IN BLOOD BY AUTOMATED COUNT: 13 % (ref 12.3–15.4)
GFR SERPL CREATININE-BSD FRML MDRD: 112 ML/MIN/1.73
GLUCOSE SERPL-MCNC: 84 MG/DL (ref 65–99)
HCT VFR BLD AUTO: 31.7 % (ref 34–46.6)
HGB BLD-MCNC: 10.8 G/DL (ref 12–15.9)
LYMPHOCYTES # BLD AUTO: 2.2 10*3/MM3 (ref 0.7–3.1)
LYMPHOCYTES NFR BLD AUTO: 26.5 % (ref 19.6–45.3)
MAGNESIUM SERPL-MCNC: 1.8 MG/DL (ref 1.6–2.6)
MCH RBC QN AUTO: 32 PG (ref 26.6–33)
MCHC RBC AUTO-ENTMCNC: 34 G/DL (ref 31.5–35.7)
MCV RBC AUTO: 94 FL (ref 79–97)
MONOCYTES # BLD AUTO: 0.6 10*3/MM3 (ref 0.1–0.9)
MONOCYTES NFR BLD AUTO: 7.5 % (ref 5–12)
NEUTROPHILS NFR BLD AUTO: 5.3 10*3/MM3 (ref 1.7–7)
NEUTROPHILS NFR BLD AUTO: 64.7 % (ref 42.7–76)
NRBC BLD AUTO-RTO: 0 /100 WBC (ref 0–0.2)
PHOSPHATE SERPL-MCNC: 3.7 MG/DL (ref 2.5–4.5)
PLATELET # BLD AUTO: 311 10*3/MM3 (ref 140–450)
PMV BLD AUTO: 7.3 FL (ref 6–12)
POTASSIUM SERPL-SCNC: 3.4 MMOL/L (ref 3.5–5.2)
RBC # BLD AUTO: 3.37 10*6/MM3 (ref 3.77–5.28)
SODIUM SERPL-SCNC: 144 MMOL/L (ref 136–145)
WBC NRBC COR # BLD: 8.2 10*3/MM3 (ref 3.4–10.8)

## 2022-01-18 PROCEDURE — 99233 SBSQ HOSP IP/OBS HIGH 50: CPT | Performed by: INTERNAL MEDICINE

## 2022-01-18 PROCEDURE — 74230 X-RAY XM SWLNG FUNCJ C+: CPT

## 2022-01-18 PROCEDURE — 99024 POSTOP FOLLOW-UP VISIT: CPT

## 2022-01-18 PROCEDURE — 97116 GAIT TRAINING THERAPY: CPT

## 2022-01-18 PROCEDURE — 85025 COMPLETE CBC W/AUTO DIFF WBC: CPT | Performed by: INTERNAL MEDICINE

## 2022-01-18 PROCEDURE — 25010000002 ENOXAPARIN PER 10 MG

## 2022-01-18 PROCEDURE — 80048 BASIC METABOLIC PNL TOTAL CA: CPT | Performed by: INTERNAL MEDICINE

## 2022-01-18 PROCEDURE — 97530 THERAPEUTIC ACTIVITIES: CPT

## 2022-01-18 PROCEDURE — 92526 ORAL FUNCTION THERAPY: CPT

## 2022-01-18 PROCEDURE — 84100 ASSAY OF PHOSPHORUS: CPT | Performed by: NURSE PRACTITIONER

## 2022-01-18 PROCEDURE — 92611 MOTION FLUOROSCOPY/SWALLOW: CPT

## 2022-01-18 PROCEDURE — 83735 ASSAY OF MAGNESIUM: CPT | Performed by: INTERNAL MEDICINE

## 2022-01-18 PROCEDURE — 0 POTASSIUM CHLORIDE 10 MEQ/100ML SOLUTION: Performed by: INTERNAL MEDICINE

## 2022-01-18 PROCEDURE — 25010000002 HYDROMORPHONE PER 4 MG

## 2022-01-18 PROCEDURE — 25010000002 DEXAMETHASONE PER 1 MG: Performed by: INTERNAL MEDICINE

## 2022-01-18 RX ORDER — CALCIUM GLUCONATE 20 MG/ML
2 INJECTION, SOLUTION INTRAVENOUS AS NEEDED
Status: DISCONTINUED | OUTPATIENT
Start: 2022-01-18 | End: 2022-01-21 | Stop reason: HOSPADM

## 2022-01-18 RX ORDER — POTASSIUM CHLORIDE 7.45 MG/ML
10 INJECTION INTRAVENOUS
Status: DISCONTINUED | OUTPATIENT
Start: 2022-01-18 | End: 2022-01-21 | Stop reason: HOSPADM

## 2022-01-18 RX ORDER — PANTOPRAZOLE SODIUM 40 MG/10ML
40 INJECTION, POWDER, LYOPHILIZED, FOR SOLUTION INTRAVENOUS ONCE
Status: COMPLETED | OUTPATIENT
Start: 2022-01-18 | End: 2022-01-18

## 2022-01-18 RX ORDER — MAGNESIUM SULFATE HEPTAHYDRATE 40 MG/ML
2 INJECTION, SOLUTION INTRAVENOUS AS NEEDED
Status: DISCONTINUED | OUTPATIENT
Start: 2022-01-18 | End: 2022-01-21 | Stop reason: HOSPADM

## 2022-01-18 RX ORDER — FENTANYL/ROPIVACAINE/NS/PF 2-625MCG/1
15 PLASTIC BAG, INJECTION (ML) EPIDURAL AS NEEDED
Status: DISCONTINUED | OUTPATIENT
Start: 2022-01-18 | End: 2022-01-21 | Stop reason: HOSPADM

## 2022-01-18 RX ORDER — POTASSIUM CHLORIDE 1.5 G/1.77G
40 POWDER, FOR SOLUTION ORAL AS NEEDED
Status: DISCONTINUED | OUTPATIENT
Start: 2022-01-18 | End: 2022-01-21 | Stop reason: HOSPADM

## 2022-01-18 RX ORDER — DEXAMETHASONE SODIUM PHOSPHATE 4 MG/ML
4 INJECTION, SOLUTION INTRA-ARTICULAR; INTRALESIONAL; INTRAMUSCULAR; INTRAVENOUS; SOFT TISSUE EVERY 6 HOURS
Status: DISCONTINUED | OUTPATIENT
Start: 2022-01-18 | End: 2022-01-21

## 2022-01-18 RX ORDER — POTASSIUM CHLORIDE 20 MEQ/1
40 TABLET, EXTENDED RELEASE ORAL AS NEEDED
Status: DISCONTINUED | OUTPATIENT
Start: 2022-01-18 | End: 2022-01-21 | Stop reason: HOSPADM

## 2022-01-18 RX ORDER — SODIUM CHLORIDE 9 MG/ML
100 INJECTION, SOLUTION INTRAVENOUS CONTINUOUS
Status: DISCONTINUED | OUTPATIENT
Start: 2022-01-18 | End: 2022-01-21 | Stop reason: HOSPADM

## 2022-01-18 RX ORDER — CALCIUM GLUCONATE 20 MG/ML
1 INJECTION, SOLUTION INTRAVENOUS AS NEEDED
Status: DISCONTINUED | OUTPATIENT
Start: 2022-01-18 | End: 2022-01-21 | Stop reason: HOSPADM

## 2022-01-18 RX ORDER — MAGNESIUM SULFATE HEPTAHYDRATE 40 MG/ML
4 INJECTION, SOLUTION INTRAVENOUS AS NEEDED
Status: DISCONTINUED | OUTPATIENT
Start: 2022-01-18 | End: 2022-01-21 | Stop reason: HOSPADM

## 2022-01-18 RX ADMIN — SODIUM CHLORIDE 100 ML/HR: 9 INJECTION, SOLUTION INTRAVENOUS at 14:45

## 2022-01-18 RX ADMIN — HYDROMORPHONE HYDROCHLORIDE 0.5 MG: 2 INJECTION, SOLUTION INTRAMUSCULAR; INTRAVENOUS; SUBCUTANEOUS at 23:51

## 2022-01-18 RX ADMIN — POTASSIUM CHLORIDE 10 MEQ: 7.46 INJECTION, SOLUTION INTRAVENOUS at 16:57

## 2022-01-18 RX ADMIN — HYDROMORPHONE HYDROCHLORIDE 0.5 MG: 2 INJECTION, SOLUTION INTRAMUSCULAR; INTRAVENOUS; SUBCUTANEOUS at 20:59

## 2022-01-18 RX ADMIN — SODIUM CHLORIDE, PRESERVATIVE FREE 10 ML: 5 INJECTION INTRAVENOUS at 21:00

## 2022-01-18 RX ADMIN — HYDROMORPHONE HYDROCHLORIDE 0.5 MG: 2 INJECTION, SOLUTION INTRAMUSCULAR; INTRAVENOUS; SUBCUTANEOUS at 02:41

## 2022-01-18 RX ADMIN — ENOXAPARIN SODIUM 40 MG: 40 INJECTION SUBCUTANEOUS at 16:57

## 2022-01-18 RX ADMIN — BARIUM SULFATE 50 ML: 400 SUSPENSION ORAL at 09:17

## 2022-01-18 RX ADMIN — SODIUM CHLORIDE, PRESERVATIVE FREE 10 ML: 5 INJECTION INTRAVENOUS at 09:47

## 2022-01-18 RX ADMIN — HYDROMORPHONE HYDROCHLORIDE 0.5 MG: 2 INJECTION, SOLUTION INTRAMUSCULAR; INTRAVENOUS; SUBCUTANEOUS at 05:04

## 2022-01-18 RX ADMIN — POTASSIUM CHLORIDE 10 MEQ: 7.46 INJECTION, SOLUTION INTRAVENOUS at 15:49

## 2022-01-18 RX ADMIN — DEXAMETHASONE SODIUM PHOSPHATE 4 MG: 4 INJECTION, SOLUTION INTRA-ARTICULAR; INTRALESIONAL; INTRAMUSCULAR; INTRAVENOUS; SOFT TISSUE at 16:57

## 2022-01-18 RX ADMIN — PANTOPRAZOLE SODIUM 40 MG: 40 INJECTION, POWDER, FOR SOLUTION INTRAVENOUS at 12:42

## 2022-01-18 RX ADMIN — DEXAMETHASONE SODIUM PHOSPHATE 4 MG: 4 INJECTION, SOLUTION INTRA-ARTICULAR; INTRALESIONAL; INTRAMUSCULAR; INTRAVENOUS; SOFT TISSUE at 11:12

## 2022-01-18 RX ADMIN — DEXAMETHASONE SODIUM PHOSPHATE 4 MG: 4 INJECTION, SOLUTION INTRA-ARTICULAR; INTRALESIONAL; INTRAMUSCULAR; INTRAVENOUS; SOFT TISSUE at 23:51

## 2022-01-18 RX ADMIN — POTASSIUM CHLORIDE 10 MEQ: 7.46 INJECTION, SOLUTION INTRAVENOUS at 13:28

## 2022-01-18 RX ADMIN — POTASSIUM CHLORIDE 10 MEQ: 7.46 INJECTION, SOLUTION INTRAVENOUS at 14:45

## 2022-01-18 RX ADMIN — LIDOCAINE 1 PATCH: 50 PATCH TOPICAL at 21:00

## 2022-01-18 NOTE — PROGRESS NOTES
[facility] Medicine Services Daily Progress Note    Patient Name: Marjorie FLORENTINO  : 1965  MRN: [id number]  Primary Care Physician:  Lurdes West APRN  Date of admission: 2022      Subjective      Chief Complaint: Back pain    HPI:    Marjorie FLORENTINO is a 56 y.o. female with a history of HTN and depression who presented to the ER at [facility] on 2022 complaining of back pain, muscle spasms, urinary incontinence, and weakness. Workup in the ER was unremarkable. She was admitted to the ED Observation Unit.     22:  Neurosurgery was consulted and ordered MRI cervical spine to assess cervical myelopathy.     22:  The patient's MRI was abnormal and neurosurgery recommends surgical intervention. She was admitted to the Hospitalist group for further treatment.    22: Patient reports adequate pain control and denies specific complaints other than some difficulty swallowing and clearing her secretions and she has a suction that she is using that helps.  Neurosurgery-May start chemical DVT prophylaxis tomorrow.    2022: Patient seen and examined.  She continues to complain of difficulty swallowing and pain with swallowing.  She did use the ice pack as recommended but all the ice has melted.  Otherwise she states no new complaints.    1/15/2022: Patient seen and examined.  She continues to have difficulty swallowing mostly with liquids, but she has been able to eat soft foods.  Per discussion with nurse, patient is hesitant to do much on her own and has family assisting her more than required.      2022: Patient seen examined this morning.  This my first time seeing the patient, still having some trouble swallowing but able to take her pills okay.  No other complaints.  Awaiting rehab placement.    2022: Patient seen and examined this morning.  Still having issues with swallowing but slightly improved.  Going for VFSS today.  No other  complaints.    Denies fever, chills, chest pain, shortness of breath, abdominal pain, nausea, vomiting, diarrhea, dysuria, or dizziness.    Objective      Vitals:   Temp:  [97.5 °F (36.4 °C)-98.2 °F (36.8 °C)] 97.6 °F (36.4 °C)  Heart Rate:  [60-78] 78  Resp:  [14-16] 16  BP: (130-151)/(76-82) 130/76    Physical exam:  General: Awake, alert, sitting up in chair, NAD  Eyes: PERRL, EOMI, conjunctive are clear  Cardiovascular: Regular rate and rhythm, no murmurs  Respiratory: Clear to auscultation bilaterally, no wheezing or rales, unlabored breathing  Abdomen: Soft, nontender, positive bowel sounds, no guarding  Neurologic: A&O, CN grossly intact, moves all extremities spontaneously  Musculoskeletal: Generalized weakness noted, no deformities  Skin: Warm, dry, intact        Result Review    Result Review:  I have personally reviewed the results from the time of this admission to 1/18/2022 10:40 EST and agree with these findings:  [x]  Laboratory  []  Microbiology  [x]  Radiology  []  EKG/Telemetry   []  Cardiology/Vascular   []  Pathology  []  Old records  []  Other:      Wounds (last 24 hours)     LDA Wound     Row Name 01/17/22 1911 01/17/22 1512 01/17/22 1100       Wound 01/14/22 0847 Right neck Incision    Wound - Properties Group Placement Date: 01/14/22  -LT Placement Time: 0847  -LT Side: Right  -LT Location: neck  -LT Primary Wound Type: Incision  -LT    Dressing Appearance no drainage; open to air  -MT no drainage; open to air  -RS no drainage; open to air  -RS    Closure Liquid skin adhesive  -MT Liquid skin adhesive  -RS Liquid skin adhesive  -RS    Base clean; dry; red  -MT clean; dry; red  -RS clean; dry; red  -RS    Drainage Amount none  -MT none  -RS none  -RS    Retired Wound - Properties Group Date first assessed: 01/14/22  - Time first assessed: 0847  -LT Side: Right  -LT Location: neck  -LT Primary Wound Type: Incision  -LT          User Key  (r) = Recorded By, (t) = Taken By, (c) = Cosigned By     "Initials Name Provider Type    LT Elizabeth Clancy RN Registered Nurse    Inez Thomas RN Registered Nurse    Malissa Mark RN Registered Nurse                  Assessment/Plan      Brief Patient Summary:  Marjorie FLORENTINO is a 56 y.o. female who presents emergency department with complaint of back pain, and \"back seizing\" that has gradually worsened since she had a fall in October 2021.  Patient reports she has had weakness in her hands, and she urinates when she stands up.  She complains of muscle spasms, and generally feeling weak.  Patient has been seen by her primary care provider, has had MRI of the brain, cervical spine and lumbar spine since her symptoms began. She denies any other bowel or bladder symptoms.    The patient underwent cervical discectomy and fusion via anterior approach on January 14, 2022.  Postoperatively PT has been working with her and are currently recommending inpatient rehab at discharge.  Patient is agreeable.         dexamethasone, 4 mg, Intravenous, Q6H  enoxaparin, 40 mg, Subcutaneous, Daily  hydroCHLOROthiazide, 25 mg, Oral, Daily  lidocaine, 1 patch, Transdermal, Q24H  losartan, 25 mg, Oral, Daily  senna-docusate sodium, 2 tablet, Oral, BID  sertraline, 25 mg, Oral, Daily  sodium chloride, 10 mL, Intravenous, Q12H  sodium chloride, 10 mL, Intravenous, Q12H             Active Hospital Problems:  Active Hospital Problems    Diagnosis    • **Cervical myelopathy (HCC)    • Acute bilateral low back pain    • Urinary incontinence    • Obesity (BMI 30-39.9)    • Depression    • Intervertebral cervical disc disorder with myelopathy, cervical region      Added automatically from request for surgery [id number]     • Primary hypertension      Plan:   Acute bilateral low back pain with Urinary incontinence  Cervical myelopathy   -Status post anterior cervical discectomy and fusion 1/14/2022  -Continue pain medication  -PT following, recommending inpatient rehab at discharge  -encourage " IS  -Medically stable for discharge to rehab    Esophageal dysphagia  -VFSS done on 1/18 showing significant esophageal swelling, unable to pass through much  -Initially was on Decadron per neurosurgery but discontinued, resume Decadron  -GI consult     Primary hypertension, chronic  -continue losartan and HCTZ  -monitor BP and vitals     Depression  -continue Zoloft      Obesity  -BMI 30.13 on admission  -encourage lifestyle modifications     DVT prophylaxis  -Lovenox    CODE STATUS:    Level Of Support Discussed With: Patient  Code Status (Patient has no pulse and is not breathing): CPR (Attempt to Resuscitate)  Medical Interventions (Patient has pulse or is breathing): Full Support      Disposition: Rehab placement    This patient has been examined wearing appropriate Personal Protective Equipment. 01/18/22      Electronically signed by Rose Sebastian DO, 01/18/22, 10:40 EST.  [facility]ist Team

## 2022-01-18 NOTE — PROGRESS NOTES
Neurosurgery Progress Note      Patient: Marjorie FLORENTINO    YOB: 1965    Date of Admission: 1/11/2022  3:26 PM    Status Post: C3-5 ACDF on 1/14/2022 with Dr. Meyers    Admitting Dx: Back pain [M54.9]  Acute bilateral low back pain, unspecified whether sciatica present [M54.50]  Cervical myelopathy (HCC) [G95.9]      Subjective     Chief Complaint: Neck pain, upper extremity weakness, dysphagia    HPI:  Patient is a 56 y.o. female on POD 4 for ACDF at C3-5 with Dr. Meyers.  Patient reports no improvement in upper extremity symptoms.  Has been working with physical therapy.  Planning for inpatient rehab.  Patient has had difficulty swallowing since surgery and states thin liquids such as water are difficult while soft foods like applesauce or pudding are okay.  She ate a portion of a grilled cheese sandwich 2 days ago which was difficult but she was able to swallow.  A swallow study and cervical xrays which show paresophageal/prevertebral edema as well as difficulty swallowing. GI was consulted and stated they did not think a Dobhoff tube would fit and therefore would recommend TPN for 2-3 days.       Current Medications:  Scheduled Meds:dexamethasone, 4 mg, Intravenous, Q6H  enoxaparin, 40 mg, Subcutaneous, Daily  hydroCHLOROthiazide, 25 mg, Oral, Daily  lidocaine, 1 patch, Transdermal, Q24H  losartan, 25 mg, Oral, Daily  senna-docusate sodium, 2 tablet, Oral, BID  sertraline, 25 mg, Oral, Daily  sodium chloride, 10 mL, Intravenous, Q12H  sodium chloride, 10 mL, Intravenous, Q12H      Continuous Infusions:Pharmacy to Dose TPN,       PRN Meds: •  acetaminophen  •  senna-docusate sodium **AND** polyethylene glycol **AND** bisacodyl **AND** bisacodyl  •  bisacodyl  •  Calcium Gluconate-NaCl **AND** calcium gluconate **AND** Calcium, Ionized  •  cyclobenzaprine  •  docusate sodium  •  HYDROcodone-acetaminophen  •  HYDROmorphone **AND** naloxone  •  magnesium hydroxide  •  magnesium sulfate **OR** magnesium  sulfate **OR** magnesium sulfate  •  melatonin  •  ondansetron  •  Pharmacy to Dose TPN  •  potassium chloride **OR** potassium chloride **OR** potassium chloride  •  potassium phosphate infusion greater than 15 mMoles **OR** potassium phosphate infusion greater than 15 mMoles **OR** potassium phosphate **OR** sodium phosphate IVPB **OR** sodium phosphate IVPB **OR** sodium phosphate IVPB  •  sodium chloride  •  sodium chloride    Objective     Vitals:    01/17/22 1247 01/17/22 2100 01/18/22 0500 01/18/22 0730   BP: 151/82 144/79 130/76    BP Location: Right arm      Patient Position: Lying      Pulse: 69 64 60 78   Resp: 16 14 16 16   Temp: 98.2 °F (36.8 °C) 98.2 °F (36.8 °C) 97.5 °F (36.4 °C) 97.6 °F (36.4 °C)   TempSrc: Oral   Oral   SpO2: 96% 94% 94% 96%   Weight:       Height:             Physical Exam:   General Appearance:    Awake, cooperative, in no acute distress   HEENT:      Pulmonary:    Normocephalic. PERRLA, EOM intact. Neck incision is well approximated clean and dry with no bleeding drainage or redness.  Trachea midline.  No LAD.    Normal respiratory rate and effort   Abdomen:     NT/ND   MSK:     Normal ROM throughout, no joint swelling, redness, or tenderness.   Skin:   Warm and dry, no bleeding, bruising, or rash   Neurologic:   A/O x3, CN II-XII grossly intact, normal sensation    throughout, 4-/5 strength upper and lower extremities.  Positive brisk Мария sign bilaterally. Negative    clonus.       RESULTS REVIEW:  Results from last 7 days   Lab Units 01/18/22  0336 01/17/22  0336 01/16/22  0346   WBC 10*3/mm3 8.20 8.80 11.10*   HEMOGLOBIN g/dL 10.8* 10.7* 11.0*   HEMATOCRIT % 31.7* 31.3* 31.9*   PLATELETS 10*3/mm3 311 305 312        Results from last 7 days   Lab Units 01/18/22  0336 01/17/22  0336 01/16/22  0346   SODIUM mmol/L 144 143 142   POTASSIUM mmol/L 3.4* 4.1 4.4   CHLORIDE mmol/L 104 106 103   CO2 mmol/L 29.0 27.0 27.0   BUN mg/dL 21* 19 13   CREATININE mg/dL 0.56* 0.50* 0.49*    CALCIUM mg/dL 9.2 9.1 9.4   GLUCOSE mg/dL 84 121* 125*        Imaging Results (Last 48 Hours)     Procedure Component Value Units Date/Time    FL Video Swallow With Speech Single Contrast [[id number]] Collected: 01/18/22 0947     Updated: 01/18/22 0951    Narrative:      DATE OF EXAM:  1/18/2022 9:14 AM     PROCEDURE:  FL VIDEO SWALLOW W SPEECH SINGLE-CONTRAST-     INDICATIONS:  failed swallow study; M54.50-Low back pain, unspecified; M50.00-Cervical  disc disorder with myelopathy, unspecified cervical region     COMPARISON:  No Comparisons Available     TECHNIQUE:   This examination was performed in conjunction with speech pathology.  Lateral video fluoroscopic evaluation of the swallowing mechanism was  performed while correlate administering to the patient various  consistency food items mixed with barium.     Fluoroscopic Time:   0.4 minutes     FINDINGS:  Single spot fluoroscopic series image was obtained during a modified  barium swallow study. The study was performed by dedicated speech  pathologist. I was present during the entire examination.        Impression:      Modified barium swallow study with fluoroscopy. Please refer to the  speech pathologist report for findings and dietary recommendations.     Electronically Signed By-Luz Jarvis MD On:1/18/2022 9:49 AM  This report was finalized on [id number] by  Luz Jarvis MD.    XR Spine Cervical 2 or 3 View [[id number]] Collected: 01/16/22 1350     Updated: 01/16/22 1358    Narrative:      DATE OF EXAM:  1/16/2022 1:06 PM     PROCEDURE:  XR SPINE CERVICAL 2 OR 3 VW-     INDICATIONS:  Postop; M54.50-Low back pain, unspecified; M50.00-Cervical disc disorder  with myelopathy, unspecified cervical region neck pain. Today. Neck  surgical fusion on January 14, 2022     COMPARISON:  Fluoroscopic images of the cervical spine in the operating room formed  on January 14, 2022     TECHNIQUE:   Two or three radiologic views of the cervical spine were  niesha.     FINDINGS:  4 plain film images cervical spine reveal increased soft tissue  thickening of the prevertebral soft tissues at the C4 and C5 and C6 and  C7 levels consistent with recent surgery. This widening of the  prevertebral soft tissues measures 2.9 cm at the C5-C6 level. The  patient is status post anterior surgical fusion with metallic plate and  screws and postoperative changes in the intervertebral disc spaces at  the C3-C4 and C4-C5 levels. No evidence of fracture or dislocation or  osteomyelitis or discitis in the cervical spine. Degenerative disc  disease and anterior vertebral body hypertrophic spurring can be seen at  the C5-C6 and C6-C7 levels. Mild levoscoliosis is seen in the cervical  spine and moderate scoliosis is seen in the superior thoracic spine. No  acute disease.       Impression:         1. Status post anterior surgical fusion at the C3 and C4 and C5 levels  with anterior metallic plate and screws and postoperative changes in the  intervertebral disc spaces.  2. Thickening of the prevertebral soft tissues measuring 2.9 cm in  thickness at the C4 and C5 and C6 and C7 levels consistent with surgery  on January 14, 2022 this might be caused by inflammation or possibly  focal fluid collection or seroma or hematoma. Clinical correlation  physical examination would BE helpful to further evaluate this finding.  There is no evidence of gas bubbles in the prevertebral soft tissues in  this area.     Electronically Signed By-Jaskaran Pollock MD On:1/16/2022 1:55 PM  This report was finalized on [id number] by  Jaskaran Pollock MD.             Assessment     MDM: Patient is a 56-year-old WF on POD 4 from C3-5 ACDF with Dr. Meyers.  Patient's subjective upper extremity symptoms have not improved.  Her clinical exam remains largely unchanged postoperatively outside of slight decrease in briskness of Мария sign.  Patient otherwise stable.  Patient has reported significant  swallowing difficulty which appears inconsistent with typical dysphagia.  She states she is largely unable to swallow thin liquids but is able to swallow soft foods such as applesauce with less difficulty.  GI was consulted and spoke with speech therapy who believed a Dobbhoff tube would not be able to be passed, and therefore GI ordered TPN for 2 to 3 days.  Swallow study was reviewed by myself and Dr. Meyers and it appears patient does have ability to swallow.  Imaging shows prevertebral edema which is significant and likely above average, though not outside of the realm of normal after the surgery she had 4 days ago.  At this time I believe TPN is premature and will recommend patient be given soft foods she previously described as swallowable such as applesauce or pudding.  Would recommend trying high caloric density foods such as boost or other similar options.  Recommend dietary consult if further nutrition recommendations in this realm are needed.  I discussed the risks of TPN as well as the risks of continuing attempts at oral diet including aspiration and patient said she is agreeable to this option over TPN.    I spoke with EZIO Lee with GI regarding wanting to discontinue plans for TPN        Plan     - D/C orders for TPN  - Recommend diet of soft foods tolerated by patient as well as trying high caloric density options  - Recommend dietary/nutrition consult for further nutrition options  - Continue working with PT/OT  - Planning for rehab  - Call with any questions or concerns    I discussed my assessment and recommendations with Dr. Meyers      Date: 1/18/2022    VON Ly  12:46 EST

## 2022-01-18 NOTE — PLAN OF CARE
Goal Outcome Evaluation:   Pt failed bedside swallow eval this shift and was made NPO. She will have a video swallow study done 1/18/2022. Pt keeps yaunker at the bedside to suction oral secretions. Pt ambulated to the bathroom several times w/standby assist. Call light within reach. Able to make needs known. Will continue to monitor.

## 2022-01-18 NOTE — CONSULTS
Nutrition Services    Patient Name: Marjorie FLORENTINO  YOB: 1965  MRN: [id number]  Admission date: 2022    Comment:    Given patient is well nourished at baseline, would recommend to wait 3-6 more days prior to starting TPN if enteral nutrition or PO is not possible. Discussed with GI, pharmacy, and nursing. Holding off on TPN for now. Will continue to monitor.     PPE Documentation        PPE Worn By Provider mask, gloves and eye protection   PPE Worn By Patient  None      CLINICAL NUTRITION ASSESSMENT      Reason for Assessment : TPN consult + LOS (7 day)      H&P      Past Medical History:   Diagnosis Date   • Depression    • Hypertension        Past Surgical History:   Procedure Laterality Date   • ANTERIOR CERVICAL DISCECTOMY W/ FUSION Bilateral 2022    Procedure: CERVICAL DISCECTOMY ANTERIOR WITH FUSION C3-C5;  Surgeon: Max Meyers MD;  Location: [facility] MAIN OR;  Service: Neurosurgery;  Laterality: Bilateral;   •  SECTION      x3         Current Problems   Acute bilateral low back pain with Urinary incontinence  Cervical myelopathy   -Status post anterior cervical discectomy and fusion 2022     Esophageal dysphagia  -VFSS done on  showing significant esophageal swelling, unable to pass through much  -Holding off on TPN For now, plans to repeat VFSS in the next 2-3 days      Primary hypertension, chronic     Depression     Obesity       Encounter Information        Trending Narrative     : Visited patient this afternoon who reports having a good appetite up until surgery. Since surgery patient has experienced dysphagia which has resulted in poor PO for 4 days. Patient is s/p VFSS today which revealed swollen esophagus, even for DHT placement. Made NPO. NFPE completed and patient well nourished upon exam.     Reached out to GI and pharmacy regarding patient's nutrition care plan. Per GI's note expected to only receive 2-3 days of TPN and then repeat VFSS. ASPEN  "recommendations are if the patient is well nourished at baseline to not initiate TPN until 7-10 days  if enteral nutrition or PO are not possible. Additionally, if the patient is well nourished and receives less than 5 days of TPN the risks outweigh the benefits.  GI agreed to hold of on TPN for now. If at repeat VFSS esophagus is still too swollen for DHT or PO, would be more appropriate for TPN then. Pharmacist Yoko reached out to primary regarding addition of IV fluids to help keep patient hydrated.      Anthropometrics        Current Height, Weight Height: 177.8 cm (70\")  Weight: 92 kg (202 lb 12.8 oz) (01/17/22 0438)       Ideal Body Weight (IBW) 68.2 kg (150 lbs)    Usual Body Weight (UBW) 200-210 lbs per pt        Trending Weight Hx     This admission: 1/18: Down 3% since admission               PTA: Wts in the low 200's over the year       Wt Readings from Last 30 Encounters:   01/17/22 0438 92 kg (202 lb 12.8 oz)   01/16/22 1919 93.8 kg (206 lb 12.8 oz)   01/15/22 1900 95.3 kg (210 lb 3.2 oz)   01/14/22 2138 96.5 kg (212 lb 11.2 oz)   01/11/22 1451 95.3 kg (210 lb)   12/07/21 0945 96.2 kg (212 lb)   11/08/21 0918 97.1 kg (214 lb)   11/06/21 1536 99.4 kg (219 lb 2.2 oz)   08/27/20 0847 94.8 kg (209 lb)   08/04/20 1224 90.7 kg (200 lb)   07/02/20 0823 90.7 kg (200 lb)   05/30/19 1446 104 kg (228 lb 6 oz)   12/31/18 1001 101 kg (223 lb 6.4 oz)   03/01/17 1108 101 kg (223 lb)   08/06/16 0853 101 kg (222 lb 12.8 oz)      BMI kg/m2 Body mass index is 29.1 kg/m².       Labs        Pertinent Labs    Results from last 7 days   Lab Units 01/18/22  0336 01/17/22  0336 01/16/22  0346   SODIUM mmol/L 144 143 142   POTASSIUM mmol/L 3.4* 4.1 4.4   CHLORIDE mmol/L 104 106 103   CO2 mmol/L 29.0 27.0 27.0   BUN mg/dL 21* 19 13   CREATININE mg/dL 0.56* 0.50* 0.49*   CALCIUM mg/dL 9.2 9.1 9.4   GLUCOSE mg/dL 84 121* 125*     Results from last 7 days   Lab Units 01/18/22  0336   MAGNESIUM mg/dL 1.8   PHOSPHORUS mg/dL 3.7 " "  HEMOGLOBIN g/dL 10.8*   HEMATOCRIT % 31.7*     COVID19   Date Value Ref Range Status   01/11/2022 Not Detected Not Detected - Ref. Range Final     Lab Results   Component Value Date    HGBA1C 5.4 11/08/2021        Medications    Scheduled Medications dexamethasone, 4 mg, Intravenous, Q6H  enoxaparin, 40 mg, Subcutaneous, Daily  hydroCHLOROthiazide, 25 mg, Oral, Daily  lidocaine, 1 patch, Transdermal, Q24H  losartan, 25 mg, Oral, Daily  senna-docusate sodium, 2 tablet, Oral, BID  sertraline, 25 mg, Oral, Daily  sodium chloride, 10 mL, Intravenous, Q12H  sodium chloride, 10 mL, Intravenous, Q12H        Infusions sodium chloride, 100 mL/hr        PRN Medications •  acetaminophen  •  senna-docusate sodium **AND** polyethylene glycol **AND** bisacodyl **AND** bisacodyl  •  bisacodyl  •  Calcium Gluconate-NaCl **AND** calcium gluconate **AND** Calcium, Ionized  •  cyclobenzaprine  •  docusate sodium  •  HYDROcodone-acetaminophen  •  HYDROmorphone **AND** naloxone  •  magnesium hydroxide  •  magnesium sulfate **OR** magnesium sulfate **OR** magnesium sulfate  •  melatonin  •  ondansetron  •  potassium chloride **OR** potassium chloride **OR** potassium chloride  •  potassium phosphate infusion greater than 15 mMoles **OR** potassium phosphate infusion greater than 15 mMoles **OR** potassium phosphate **OR** sodium phosphate IVPB **OR** sodium phosphate IVPB **OR** sodium phosphate IVPB  •  sodium chloride  •  sodium chloride     Physical Findings        Trending Physical   Appearance, NFPE 1/18: NFPE completed, well nourished upon exam.    --  Edema  None documented      Bowel Function + BM 1/15      Tubes None      Chewing/Swallowing Dysphagia noted following sx. S/p VFSS 1/18 recommending NPO status      Skin Right neck incision        Estimated/Assessed Needs    1/18/22    Energy Requirements    Height for Calculation  Height: 177.8 cm (70\")   Weight for Calculation 68.2 kg (150 lbs)-IBW    Method for Estimation  " 25-30 kcals/kg    EST Needs (kcal/day) 7711-4746 kcals        Protein Requirements    Weight for Calculation 68.2 kg (150 lbs)    EST Protein Needs (g/kg) 1.2-1.5 g/kg    EST Daily Needs (g/day)  g        Fluid Requirements     Estimated Needs (mL/day) 1 mL/kcal-adjust based on hydration status        Fluid Deficit    Current Na Level (mEq/L)    Desired Na Level (mEq/L)    Estimated Fluid Deficit (L)       Current Nutrition Orders & Evaluation of Intake       Oral Nutrition     Food Allergies NKFA    Current PO Diet NPO Diet   Supplement    PO Evaluation     Trending % PO Intake 1/18: 0% currently r/t NPO status. 0-50% of meals over the last 4 days following surgery      Enteral Nutrition    Enteral Route    TF Modular    TF Delivery Method    Current TF Order    Current Water Flush     TF Residual/Tolerance     TF Observation         Parenteral Nutrition     TPN Route    Current TPN Order    Lipids (mL/%/frequency)     MVI Frequency     Trace Element Frequency     Total # Days on TPN    TPN Observation         Nutrition Course Details    PO Diets: 1/11-1/17 Regular  1/18 NPO    Nutrition Support:      Nutritional Risk Screening        NRS-2002 Score          Nutrition Diagnosis         Nutrition Dx Problem 1 Swallowing difficulty r/t dysphagia following cervical discectomy and fusion AEB s/p VFSS revealing swollen esophagus.       Nutrition Dx Problem 2        Intervention Goal         Intervention Goal(s) Diet to advance with tolerance    Consider TPN in the next 3-6 days if swelling does not improve and enteral or PO intakes are not plausible.      Nutrition Intervention        RD Action Continue to monitor       Nutrition Prescription          Diet Prescription NPO    Supplement Prescription      Enteral Prescription        TPN Prescription      Monitor/Evaluation        Monitor I&O, Weight, Skin status, GI status, Symptoms, POC/GOC       Electronically signed by:  Amy Patton RD  01/18/22 13:56  EST

## 2022-01-18 NOTE — PLAN OF CARE
Goal Outcome Evaluation:  Plan of Care Reviewed With: patient           Outcome Summary: Patient doing well ambulating with a walker.  Unable to swallow or manage secretions, Drs. aware

## 2022-01-18 NOTE — THERAPY TREATMENT NOTE
Subjective: Pt agreeable to therapeutic plan of care.    Objective:     Bed mobility - CGA supine to sit with hob elevated.  Transfers - CGA and with rolling walker sit to stand off eob and off toilet  Ambulation - 80 feet CGA and with rolling walker 15 feet to the bathroom before gait training in hallway. Decreased step length, step though gait pattern, decreased heel strike and bilateral knees slightly flexed.      Pain: 4 VAS cervical pain  Education: Provided education on importance of mobility and skilled verbal / tactile cueing throughout intervention.     Assessment: Marjorie FLORENTINO presents with functional mobility impairments which indicate the need for skilled intervention. Tolerating session today without incident. Pt slowly improving her mobility. pt has decreased ability to manage her upper extremities.  Pt is motivated and demonstrates good potential to improve her functional mobility.  Will continue to follow and progress as tolerated.     Plan/Recommendations:   Pt would benefit from Acute Inpatient Rehabilitation placement at discharge from facility   Pt desires Acute Inpatient Rehabilitation placement at discharge. Pt cooperative; agreeable to therapeutic recommendations and plan of care.     Basic Mobility 6-click:  Rollin = Total, A lot = 2, A little = 3; 4 = None  Supine>Sit:   1 = Total, A lot = 2, A little = 3; 4 = None   Sit>Stand with arms:  1 = Total, A lot = 2, A little = 3; 4 = None  Bed>Chair:   1 = Total, A lot = 2, A little = 3; 4 = None  Ambulate in room:  1 = Total, A lot = 2, A little = 3; 4 = None  3-5 Steps with railin = Total, A lot = 2, A little = 3; 4 = None  Score: 17    Modified [place]: 4 = Moderately severe disability (Unable to attend to own bodily needs without assistance, and unable to walk unassisted)     Post-Tx Position: Supine with HOB Elevated, Alarms activated and Call light and personal items within reach   PPE: gloves, surgical mask, eyewear  protection

## 2022-01-18 NOTE — PLAN OF CARE
Goal Outcome Evaluation:  VFSS completed this date. Pt observed in the lateral projection and given trials of NTL barium by spoon x1. No other trial/consistencies given d/t severity of dysphagia. Oral phase WFL. Upon swallow initiation, minimal clearance through UES, resulting in aspiration during and after the swallow. Pt senses aspirated material with cough reaction. Pt required suctioning at end of procedure. Suspect dysphagia severity is d/t swelling of prevertebral space, causing no lumen. Rec pt remain NPO. Question if DHT would impact swallow further. Discussed VFSS results and recs with GI Nicky HOLGUIN, following procedure. Per RD note, pt is not appropriate for TPN at this time. Discussed with MD this afternoon and MD recommending soft diet. ST little to offer at this point. ST will sign off. Please re-consult if needed. Thank you.

## 2022-01-18 NOTE — CONSULTS
GI CONSULT  NOTE:    Referring Provider: Dr. Sebastian    Chief complaint: Dysphagia    Subjective .     History of present illness: Marjorie FLORENTINO is a 56 y.o. female with history of hypertension and  x3 who presented on  with complaints of back pain.  MRI of the cervical spine on  showed moderate to severe spinal canal stenosis.  The patient ultimately underwent C3-C4-C5 anterior cervical discectomy and fusion on 2022.  Since surgery, the patient has been struggling with dysphagia.  GIs been asked to consult for further evaluation and treatment.  The patient denies any issues with dysphagia prior to her cervical surgery.  She denies any nausea/vomiting, heartburn.  No abdominal pain.  States that she has moved her bowels once since surgery.  Denies any bright red blood per rectum or melena.  Of note, the patient did undergo video swallow evaluation this morning which did show significant swelling.  No overt stricture.      Endo History:  3/2017 colonoscopy (Dr. Milligan)- polyp (biopsy with quantity not sufficient for diagnosis), diverticulosis    Past Medical History:  Past Medical History:   Diagnosis Date   • Depression    • Hypertension        Past Surgical History:  Past Surgical History:   Procedure Laterality Date   • ANTERIOR CERVICAL DISCECTOMY W/ FUSION Bilateral 2022    Procedure: CERVICAL DISCECTOMY ANTERIOR WITH FUSION C3-C5;  Surgeon: Max Meyers MD;  Location: [facility];  Service: Neurosurgery;  Laterality: Bilateral;   •  SECTION      x3        Social History:  Social History     Tobacco Use   • Smoking status: Never Smoker   • Smokeless tobacco: Never Used   Vaping Use   • Vaping Use: Never used   Substance Use Topics   • Alcohol use: Never   • Drug use: Defer       Family History:  Family History   Problem Relation Age of Onset   • COPD Mother    • Coronary artery disease Mother    • Stroke Father    • Hypertension Father    • Kidney disease Father    •  Hypertension Sister    • Stroke Maternal Grandmother    • Stroke Paternal Grandmother        Medications:  Medications Prior to Admission   Medication Sig Dispense Refill Last Dose   • Cholecalciferol (Vitamin D) 50 MCG (2000 UT) capsule Take 2,000 Units by mouth Daily.      • hydroCHLOROthiazide (HYDRODIURIL) 25 MG tablet TAKE ONE TABLET BY MOUTH DAILY 90 tablet 1    • losartan (Cozaar) 25 MG tablet Take 1 tablet by mouth Daily. 30 tablet 3    • magnesium gluconate (MAGONATE) 500 MG tablet Take 250 mg by mouth Daily.      • sertraline (ZOLOFT) 50 MG tablet Take 25 mg by mouth Daily.      • Turmeric (QC TUMERIC COMPLEX PO) Take  by mouth Daily.          Scheduled Meds:dexamethasone, 4 mg, Intravenous, Q6H  enoxaparin, 40 mg, Subcutaneous, Daily  hydroCHLOROthiazide, 25 mg, Oral, Daily  lidocaine, 1 patch, Transdermal, Q24H  losartan, 25 mg, Oral, Daily  senna-docusate sodium, 2 tablet, Oral, BID  sertraline, 25 mg, Oral, Daily  sodium chloride, 10 mL, Intravenous, Q12H  sodium chloride, 10 mL, Intravenous, Q12H      Continuous Infusions:   PRN Meds:.•  acetaminophen  •  senna-docusate sodium **AND** polyethylene glycol **AND** bisacodyl **AND** bisacodyl  •  bisacodyl  •  cyclobenzaprine  •  docusate sodium  •  HYDROcodone-acetaminophen  •  HYDROmorphone **AND** naloxone  •  magnesium hydroxide  •  melatonin  •  ondansetron  •  sodium chloride  •  sodium chloride    ALLERGIES:  Ibuprofen and Lisinopril    ROS:  The following systems were reviewed and negative;  Constitution:  No fevers, chills, no unintentional weight loss  Skin: no rash, no jaundice  Eyes:  No blurry vision, no eye pain  HENT:  No change in hearing or smell  Resp:  No dyspnea or cough  CV:  No chest pain or palpitations  :  No dysuria, hematuria  Musculoskeletal:  No leg cramps or arthralgias  Neuro:  No tremor, no numbness  Psych:  No depression or confusion    Objective     Vital Signs:   Vitals:    01/17/22 1247 01/17/22 2100 01/18/22 0500  01/18/22 0730   BP: 151/82 144/79 130/76    BP Location: Right arm      Patient Position: Lying      Pulse: 69 64 60 78   Resp: 16 14 16 16   Temp: 98.2 °F (36.8 °C) 98.2 °F (36.8 °C) 97.5 °F (36.4 °C) 97.6 °F (36.4 °C)   TempSrc: Oral   Oral   SpO2: 96% 94% 94% 96%   Weight:       Height:           Physical Exam:       General Appearance:    Awake and alert, in no acute distress   Head:    Normocephalic, without obvious abnormality, atraumatic   Throat:   No oral lesions, no thrush, oral mucosa moist   Lungs:     Respirations regular, even and unlabored   Chest Wall:    No abnormalities observed   Abdomen:     Soft, non-tender, no rebound or guarding, non-distended   Rectal:     Deferred   Extremities:   Moves all extremities, no edema, no cyanosis   Pulses:   Pulses palpable and equal bilaterally   Skin:   No rash, no jaundice, normal palpation   Lymph nodes:   No cervical, supraclavicular or submandibular palpable adenopathy   Neurologic:   Cranial nerves 2 - 12 grossly intact, no asterixis       Results Review:   I reviewed the patient's labs and imaging.  CBC    Results from last 7 days   Lab Units 01/18/22  0336 01/17/22  0336 01/16/22  0346 01/15/22  0340 01/14/22  1446 01/12/22  0410   WBC 10*3/mm3 8.20 8.80 11.10* 9.70 12.70* 7.20   HEMOGLOBIN g/dL 10.8* 10.7* 11.0* 11.3* 11.2* 12.2   PLATELETS 10*3/mm3 311 305 312 308 301 342     CMP   Results from last 7 days   Lab Units 01/18/22  0336 01/17/22  0336 01/16/22  0346 01/15/22  0340 01/14/22  1446 01/12/22  0410   SODIUM mmol/L 144 143 142 141 136 141   POTASSIUM mmol/L 3.4* 4.1 4.4 4.2 3.9 4.5   CHLORIDE mmol/L 104 106 103 103 100 104   CO2 mmol/L 29.0 27.0 27.0 28.0 27.0 30.0*   BUN mg/dL 21* 19 13 12 15 17   CREATININE mg/dL 0.56* 0.50* 0.49* 0.63 0.57 0.83   GLUCOSE mg/dL 84 121* 125* 111* 108* 92   MAGNESIUM mg/dL 1.8  --   --   --   --   --      Cr Clearance Estimated Creatinine Clearance: 139.6 mL/min (A) (by C-G formula based on SCr of 0.56 mg/dL  (L)).  Coag     HbA1C   Lab Results   Component Value Date    HGBA1C 5.4 11/08/2021     Blood Glucose No results found for: POCGLU  Infection     UA    Results from last 7 days   Lab Units 01/11/22  1732   NITRITE UA  Negative     Radiology(recent) XR Spine Cervical 2 or 3 View    Result Date: 1/16/2022   1. Status post anterior surgical fusion at the C3 and C4 and C5 levels with anterior metallic plate and screws and postoperative changes in the intervertebral disc spaces. 2. Thickening of the prevertebral soft tissues measuring 2.9 cm in thickness at the C4 and C5 and C6 and C7 levels consistent with surgery on January 14, 2022 this might be caused by inflammation or possibly focal fluid collection or seroma or hematoma. Clinical correlation physical examination would BE helpful to further evaluate this finding. There is no evidence of gas bubbles in the prevertebral soft tissues in this area.  Electronically Signed By-Jaskaran Pollock MD On:1/16/2022 1:55 PM This report was finalized on [id number] by  Jaskaran Pollock MD.    FL Video Swallow With Speech Single Contrast    Result Date: 1/18/2022  Modified barium swallow study with fluoroscopy. Please refer to the speech pathologist report for findings and dietary recommendations.  Electronically Signed By-Luz Jarvis MD On:1/18/2022 9:49 AM This report was finalized on [id number] by  Luz Jarvis MD.         ASSESSMENT:  -Dysphagia -suspect due to esophageal swelling following cervical surgery  -Feeding difficulty secondary to dysphagia  -S/p C3-C4-C5 anterior cervical discectomy on 1/14  -Mild normocytic anemia  -Hypertension    PLAN:  Patient presented on 1/11 with complaints of back and neck pain.  MRI of the cervical spine did show moderate to severe spinal canal stenosis.  Patient underwent C3-C4-C5 anterior cervical discectomy on 1/14.  She has been having dysphagia since that time.  GIs been asked to consult for further evaluation of  dysphagia.  Patient had video swallow evaluation this morning.  I discussed results with Vanessa oK (speech therapist) in depth this morning.  Patient's dysphagia is due to significant swelling in the neck and esophagus following cervical surgery.  No overt stricture was identified.  Options including Dobbhoff tube placement were discussed, but speech therapy believes that the patient's esophagus is too swollen for even a Dobbhoff tube to pass.  We will start TPN x2 to 3 days.  Plan to repeat video swallow evaluation later this week.  No plans for EGD as this will not  as there is no overt stricture to dilate.  Agree with steroids to help decrease inflammation.  Start PPI.  Supportive care.      I discussed the patients findings and my recommendations with the patient.  I will discuss case with Dr. Orosco and change the plan accordingly.    We appreciate the referral.    Electronically signed by EZIO Delgadillo, 01/18/22, 11:21 AM EST.

## 2022-01-18 NOTE — PLAN OF CARE
Pt resting; daughter at bedside. C/o pain tx per MAR. Pt ambulating in room with stand by assistance and use of walker. Pt NPO r/t failure of bedside swallow study. Pt to have video swallow study today. VSS. Call light within reach; pt able to make needs known. Will continue to monitor.

## 2022-01-18 NOTE — PLAN OF CARE
Goal Outcome Evaluation:        Bed mobility - CGA supine to sit with hob elevated.  Transfers - CGA and with rolling walker sit to stand off eob and off toilet  Ambulation - 80 feet CGA and with rolling walker 15 feet to the bathroom before gait training in hallway. Decreased step length, step though gait pattern, decreased heel strike and bilateral knees slightly flexed.      Pt would benefit from Inpatient Rehabilitation placement at discharge from facility   Pt desires Inpatient Rehabilitation placement at discharge. Pt cooperative; agreeable to therapeutic recommendations and plan of care.

## 2022-01-19 LAB
ALBUMIN SERPL-MCNC: 3.8 G/DL (ref 3.5–5.2)
ALBUMIN/GLOB SERPL: 1.3 G/DL
ALP SERPL-CCNC: 51 U/L (ref 39–117)
ALT SERPL W P-5'-P-CCNC: 8 U/L (ref 1–33)
ANION GAP SERPL CALCULATED.3IONS-SCNC: 14 MMOL/L (ref 5–15)
AST SERPL-CCNC: 13 U/L (ref 1–32)
BASOPHILS # BLD AUTO: 0 10*3/MM3 (ref 0–0.2)
BASOPHILS NFR BLD AUTO: 0.2 % (ref 0–1.5)
BILIRUB SERPL-MCNC: 0.3 MG/DL (ref 0–1.2)
BUN SERPL-MCNC: 17 MG/DL (ref 6–20)
BUN/CREAT SERPL: 33.3 (ref 7–25)
CA-I SERPL ISE-MCNC: 1.27 MMOL/L (ref 1.2–1.3)
CALCIUM SPEC-SCNC: 9.2 MG/DL (ref 8.6–10.5)
CHLORIDE SERPL-SCNC: 102 MMOL/L (ref 98–107)
CO2 SERPL-SCNC: 24 MMOL/L (ref 22–29)
CREAT SERPL-MCNC: 0.51 MG/DL (ref 0.57–1)
DEPRECATED RDW RBC AUTO: 41.6 FL (ref 37–54)
EOSINOPHIL # BLD AUTO: 0 10*3/MM3 (ref 0–0.4)
EOSINOPHIL NFR BLD AUTO: 0 % (ref 0.3–6.2)
ERYTHROCYTE [DISTWIDTH] IN BLOOD BY AUTOMATED COUNT: 12.6 % (ref 12.3–15.4)
GFR SERPL CREATININE-BSD FRML MDRD: 125 ML/MIN/1.73
GLOBULIN UR ELPH-MCNC: 2.9 GM/DL
GLUCOSE SERPL-MCNC: 97 MG/DL (ref 65–99)
HCT VFR BLD AUTO: 34.3 % (ref 34–46.6)
HGB BLD-MCNC: 11.5 G/DL (ref 12–15.9)
LYMPHOCYTES # BLD AUTO: 0.6 10*3/MM3 (ref 0.7–3.1)
LYMPHOCYTES NFR BLD AUTO: 7 % (ref 19.6–45.3)
MAGNESIUM SERPL-MCNC: 1.9 MG/DL (ref 1.6–2.6)
MCH RBC QN AUTO: 31.4 PG (ref 26.6–33)
MCHC RBC AUTO-ENTMCNC: 33.6 G/DL (ref 31.5–35.7)
MCV RBC AUTO: 93.4 FL (ref 79–97)
MONOCYTES # BLD AUTO: 0.2 10*3/MM3 (ref 0.1–0.9)
MONOCYTES NFR BLD AUTO: 3 % (ref 5–12)
NEUTROPHILS NFR BLD AUTO: 7.1 10*3/MM3 (ref 1.7–7)
NEUTROPHILS NFR BLD AUTO: 89.8 % (ref 42.7–76)
NRBC BLD AUTO-RTO: 0 /100 WBC (ref 0–0.2)
PHOSPHATE SERPL-MCNC: 3.5 MG/DL (ref 2.5–4.5)
PLATELET # BLD AUTO: 337 10*3/MM3 (ref 140–450)
PMV BLD AUTO: 7.5 FL (ref 6–12)
POTASSIUM SERPL-SCNC: 4 MMOL/L (ref 3.5–5.2)
PREALB SERPL-MCNC: 18.9 MG/DL (ref 20–40)
PROT SERPL-MCNC: 6.7 G/DL (ref 6–8.5)
RBC # BLD AUTO: 3.68 10*6/MM3 (ref 3.77–5.28)
SODIUM SERPL-SCNC: 140 MMOL/L (ref 136–145)
WBC NRBC COR # BLD: 7.9 10*3/MM3 (ref 3.4–10.8)

## 2022-01-19 PROCEDURE — 25010000002 HYDROMORPHONE PER 4 MG

## 2022-01-19 PROCEDURE — 97116 GAIT TRAINING THERAPY: CPT

## 2022-01-19 PROCEDURE — 82330 ASSAY OF CALCIUM: CPT | Performed by: NURSE PRACTITIONER

## 2022-01-19 PROCEDURE — 25010000002 DEXAMETHASONE PER 1 MG: Performed by: INTERNAL MEDICINE

## 2022-01-19 PROCEDURE — 85025 COMPLETE CBC W/AUTO DIFF WBC: CPT | Performed by: NURSE PRACTITIONER

## 2022-01-19 PROCEDURE — 97110 THERAPEUTIC EXERCISES: CPT

## 2022-01-19 PROCEDURE — 84100 ASSAY OF PHOSPHORUS: CPT | Performed by: NURSE PRACTITIONER

## 2022-01-19 PROCEDURE — 25010000002 ENOXAPARIN PER 10 MG

## 2022-01-19 PROCEDURE — 80053 COMPREHEN METABOLIC PANEL: CPT | Performed by: NURSE PRACTITIONER

## 2022-01-19 PROCEDURE — 97112 NEUROMUSCULAR REEDUCATION: CPT

## 2022-01-19 PROCEDURE — 83735 ASSAY OF MAGNESIUM: CPT | Performed by: NURSE PRACTITIONER

## 2022-01-19 PROCEDURE — 84134 ASSAY OF PREALBUMIN: CPT | Performed by: NURSE PRACTITIONER

## 2022-01-19 PROCEDURE — 99232 SBSQ HOSP IP/OBS MODERATE 35: CPT | Performed by: INTERNAL MEDICINE

## 2022-01-19 RX ORDER — HYDROMORPHONE HCL 110MG/55ML
PATIENT CONTROLLED ANALGESIA SYRINGE INTRAVENOUS
Status: COMPLETED
Start: 2022-01-19 | End: 2022-01-19

## 2022-01-19 RX ADMIN — DEXAMETHASONE SODIUM PHOSPHATE 4 MG: 4 INJECTION, SOLUTION INTRA-ARTICULAR; INTRALESIONAL; INTRAMUSCULAR; INTRAVENOUS; SOFT TISSUE at 05:01

## 2022-01-19 RX ADMIN — HYDROMORPHONE HYDROCHLORIDE 0.5 MG: 2 INJECTION, SOLUTION INTRAMUSCULAR; INTRAVENOUS; SUBCUTANEOUS at 05:01

## 2022-01-19 RX ADMIN — SENNOSIDES AND DOCUSATE SODIUM 2 TABLET: 8.6; 5 TABLET ORAL at 21:07

## 2022-01-19 RX ADMIN — HYDROMORPHONE HYDROCHLORIDE 0.5 MG: 2 INJECTION, SOLUTION INTRAMUSCULAR; INTRAVENOUS; SUBCUTANEOUS at 02:30

## 2022-01-19 RX ADMIN — HYDROCODONE BITARTRATE AND ACETAMINOPHEN 1 TABLET: 5; 325 TABLET ORAL at 21:07

## 2022-01-19 RX ADMIN — DEXAMETHASONE SODIUM PHOSPHATE 4 MG: 4 INJECTION, SOLUTION INTRA-ARTICULAR; INTRALESIONAL; INTRAMUSCULAR; INTRAVENOUS; SOFT TISSUE at 11:57

## 2022-01-19 RX ADMIN — SODIUM CHLORIDE 100 ML/HR: 9 INJECTION, SOLUTION INTRAVENOUS at 15:11

## 2022-01-19 RX ADMIN — HYDROCHLOROTHIAZIDE 25 MG: 25 TABLET ORAL at 08:36

## 2022-01-19 RX ADMIN — CYCLOBENZAPRINE 10 MG: 10 TABLET, FILM COATED ORAL at 02:30

## 2022-01-19 RX ADMIN — ENOXAPARIN SODIUM 40 MG: 40 INJECTION SUBCUTANEOUS at 17:28

## 2022-01-19 RX ADMIN — SODIUM CHLORIDE, PRESERVATIVE FREE 10 ML: 5 INJECTION INTRAVENOUS at 21:07

## 2022-01-19 RX ADMIN — SODIUM CHLORIDE, PRESERVATIVE FREE 10 ML: 5 INJECTION INTRAVENOUS at 21:08

## 2022-01-19 RX ADMIN — SERTRALINE 25 MG: 25 TABLET, FILM COATED ORAL at 08:36

## 2022-01-19 RX ADMIN — CYCLOBENZAPRINE 10 MG: 10 TABLET, FILM COATED ORAL at 21:07

## 2022-01-19 RX ADMIN — DEXAMETHASONE SODIUM PHOSPHATE 4 MG: 4 INJECTION, SOLUTION INTRA-ARTICULAR; INTRALESIONAL; INTRAMUSCULAR; INTRAVENOUS; SOFT TISSUE at 17:28

## 2022-01-19 RX ADMIN — LIDOCAINE 1 PATCH: 50 PATCH TOPICAL at 21:07

## 2022-01-19 RX ADMIN — HYDROMORPHONE HYDROCHLORIDE 0.5 MG: 2 INJECTION, SOLUTION INTRAMUSCULAR; INTRAVENOUS; SUBCUTANEOUS at 22:58

## 2022-01-19 RX ADMIN — DEXAMETHASONE SODIUM PHOSPHATE 4 MG: 4 INJECTION, SOLUTION INTRA-ARTICULAR; INTRALESIONAL; INTRAMUSCULAR; INTRAVENOUS; SOFT TISSUE at 22:58

## 2022-01-19 RX ADMIN — LOSARTAN POTASSIUM 25 MG: 25 TABLET, FILM COATED ORAL at 08:36

## 2022-01-19 NOTE — PROGRESS NOTES
[facility] Medicine Services Daily Progress Note    Patient Name: Marjorie FLORENTINO  : 1965  MRN: [id number]  Primary Care Physician:  Lurdes West APRN  Date of admission: 2022      Subjective      Chief Complaint: Back pain    HPI:    Marjorie FLORENTINO is a 56 y.o. female with a history of HTN and depression who presented to the ER at [facility] on 2022 complaining of back pain, muscle spasms, urinary incontinence, and weakness. Workup in the ER was unremarkable. She was admitted to the ED Observation Unit.     22:  Neurosurgery was consulted and ordered MRI cervical spine to assess cervical myelopathy.     22:  The patient's MRI was abnormal and neurosurgery recommends surgical intervention. She was admitted to the Hospitalist group for further treatment.    22: Patient reports adequate pain control and denies specific complaints other than some difficulty swallowing and clearing her secretions and she has a suction that she is using that helps.  Neurosurgery-May start chemical DVT prophylaxis tomorrow.    2022: Patient seen and examined.  She continues to complain of difficulty swallowing and pain with swallowing.  She did use the ice pack as recommended but all the ice has melted.  Otherwise she states no new complaints.    1/15/2022: Patient seen and examined.  She continues to have difficulty swallowing mostly with liquids, but she has been able to eat soft foods.  Per discussion with nurse, patient is hesitant to do much on her own and has family assisting her more than required.      2022: Patient seen examined this morning.  This my first time seeing the patient, still having some trouble swallowing but able to take her pills okay.  No other complaints.  Awaiting rehab placement.    2022: Patient seen and examined this morning.  Still having issues with swallowing but slightly improved.  Going for VFSS today.  No other  complaints.    1/19/2022: Patient seen examined this morning.  Improving, swallowing improving.  On low residual diet per GI and neurosurgery.  Awaiting improvement in dysphagia.  No other complaints.    Denies fever, chills, chest pain, shortness of breath, abdominal pain, nausea, vomiting, diarrhea, dysuria, or dizziness.    Objective      Vitals:   Temp:  [97.7 °F (36.5 °C)-97.9 °F (36.6 °C)] 97.9 °F (36.6 °C)  Heart Rate:  [70-81] 81  Resp:  [15-17] 15  BP: (148-153)/(77-92) 153/77  Flow (L/min):  [2] 2    Physical exam:  General: Awake, alert, sitting up in chair, NAD  Eyes: PERRL, EOMI, conjunctive are clear  Cardiovascular: Regular rate and rhythm, no murmurs  Respiratory: Clear to auscultation bilaterally, no wheezing or rales, unlabored breathing  Abdomen: Soft, nontender, positive bowel sounds, no guarding  Neurologic: A&O, CN grossly intact, moves all extremities spontaneously  Musculoskeletal: Generalized weakness noted, no deformities  Skin: Warm, dry, intact        Result Review    Result Review:  I have personally reviewed the results from the time of this admission to 1/19/2022 11:57 EST and agree with these findings:  [x]  Laboratory  []  Microbiology  [x]  Radiology  []  EKG/Telemetry   []  Cardiology/Vascular   []  Pathology  []  Old records  []  Other:      Wounds (last 24 hours)     LDA Wound     Row Name 01/19/22 0715 01/19/22 0330 01/18/22 2330       Wound 01/14/22 0847 Right neck Incision    Wound - Properties Group Placement Date: 01/14/22  -LT Placement Time: 0847  -LT Side: Right  -LT Location: neck  -LT Primary Wound Type: Incision  -LT    Dressing Appearance dry; intact  -LB open to air; dry  -AB open to air; dry  -AB    Closure Open to air  -LB Open to air; Liquid skin adhesive  -AB Open to air; Liquid skin adhesive  -AB    Base -- -- clean; dry  -AB    Periwound -- dry; swelling; pink  -AB --    Drainage Amount -- none  -AB none  -AB    Retired Wound - Properties Group Date first  "assessed: 01/14/22  -LT Time first assessed: 0847 -LT Side: Right  -LT Location: neck  -LT Primary Wound Type: Incision  -LT    Row Name 01/18/22 1930             Wound 01/14/22 0847 Right neck Incision    Wound - Properties Group Placement Date: 01/14/22  -LT Placement Time: 0847 -LT Side: Right  -LT Location: neck  -LT Primary Wound Type: Incision  -LT      Dressing Appearance open to air; dry; intact  -AB      Closure Liquid skin adhesive; Open to air  -AB      Base clean; dry  -AB      Periwound dry; swelling; pink; intact  -AB      Periwound Temperature cool  -AB      Drainage Amount none  -AB      Retired Wound - Properties Group Date first assessed: 01/14/22  -LT Time first assessed: 0847  -LT Side: Right  -LT Location: neck  -LT Primary Wound Type: Incision  -LT            User Key  (r) = Recorded By, (t) = Taken By, (c) = Cosigned By    Initials Name Provider Type    AB Adelaida Phillips LPN Licensed Nurse    LT Elizabeth Clancy RN Registered Nurse    Elizabeth Solomon RN Registered Nurse                  Assessment/Plan      Brief Patient Summary:  Marjorie FLORENTINO is a 56 y.o. female who presents emergency department with complaint of back pain, and \"back seizing\" that has gradually worsened since she had a fall in October 2021.  Patient reports she has had weakness in her hands, and she urinates when she stands up.  She complains of muscle spasms, and generally feeling weak.  Patient has been seen by her primary care provider, has had MRI of the brain, cervical spine and lumbar spine since her symptoms began. She denies any other bowel or bladder symptoms.    The patient underwent cervical discectomy and fusion via anterior approach on January 14, 2022.  Postoperatively PT has been working with her and are currently recommending inpatient rehab at discharge.  Patient is agreeable.         dexamethasone, 4 mg, Intravenous, Q6H  enoxaparin, 40 mg, Subcutaneous, Daily  hydroCHLOROthiazide, 25 mg, " Oral, Daily  lidocaine, 1 patch, Transdermal, Q24H  losartan, 25 mg, Oral, Daily  senna-docusate sodium, 2 tablet, Oral, BID  sertraline, 25 mg, Oral, Daily  sodium chloride, 10 mL, Intravenous, Q12H  sodium chloride, 10 mL, Intravenous, Q12H       sodium chloride, 100 mL/hr, Last Rate: 100 mL/hr (01/18/22 1445)         Active Hospital Problems:  Active Hospital Problems    Diagnosis    • **Cervical myelopathy (HCC)    • Acute bilateral low back pain    • Urinary incontinence    • Obesity (BMI 30-39.9)    • Depression    • Intervertebral cervical disc disorder with myelopathy, cervical region      Added automatically from request for surgery [id number]     • Primary hypertension      Plan:   Acute bilateral low back pain with Urinary incontinence  Cervical myelopathy   -Status post anterior cervical discectomy and fusion 1/14/2022  -Continue pain medication  -PT following, recommending inpatient rehab at discharge  -encourage IS  -Medically stable for discharge to rehab    Esophageal dysphagia  -VFSS done on 1/18 showing significant esophageal swelling, unable to pass through much  -Initially was on Decadron per neurosurgery but discontinued, resumed Decadron  -GI signed off, no plan for any endoscopy work-up at this time  -Patient well-nourished diet advanced to soft per GI and neurosurgery     Primary hypertension, chronic  -continue losartan and HCTZ  -monitor BP and vitals     Depression  -continue Zoloft      Obesity  -BMI 30.13 on admission  -encourage lifestyle modifications     DVT prophylaxis  -Lovenox    CODE STATUS:    Level Of Support Discussed With: Patient  Code Status (Patient has no pulse and is not breathing): CPR (Attempt to Resuscitate)  Medical Interventions (Patient has pulse or is breathing): Full Support      Disposition: Rehab placement    This patient has been examined wearing appropriate Personal Protective Equipment. 01/19/22      Electronically signed by Rose Sebastian DO, 01/19/22, 11:57  EST.  [facility] Hospitalist Team

## 2022-01-19 NOTE — THERAPY TREATMENT NOTE
Subjective: Pt agreeable to therapeutic plan of care. Pt still having probs swallowing    Objective:     Bed mobility - N/A or Not attempted.  Transfers - Supervision and with rolling walker  Ambulation - 80 feet CGA and with rolling walker    Pain: 0 VAS  Education: Provided education on importance of mobility and skilled verbal / tactile cueing throughout intervention.     Assessment: Marjorie FLORENTINO presents with functional mobility impairments which indicate the need for skilled intervention. Tolerating session today without incident. Pt still having probs with fine motor and coordination throughout. Worked on reciprocal movements with emphasis on incr end ranges all 4 extremities. Req vc's to slow down and incr time/wt on BLE to improve stride and heelstrike. Still easily challenged. Would benefit from acute rehab at [place]. Will continue to follow and progress as tolerated.     Plan/Recommendations:   Pt would benefit from  acute Inpatient Rehabilitation placement at discharge from facility and requires no DME at discharge.   Pt desires acute Inpatient Rehabilitation placement at discharge. Pt cooperative; agreeable to therapeutic recommendations and plan of care.     Basic Mobility 6-click:  Rollin = Total, A lot = 2, A little = 3; 4 = None  Supine>Sit:   1 = Total, A lot = 2, A little = 3; 4 = None   Sit>Stand with arms:  1 = Total, A lot = 2, A little = 3; 4 = None  Bed>Chair:   1 = Total, A lot = 2, A little = 3; 4 = None  Ambulate in room:  1 = Total, A lot = 2, A little = 3; 4 = None  3-5 Steps with railin = Total, A lot = 2, A little = 3; 4 = None  Score: 19    Post-Tx Position: Up in Chair and Call light and personal items within reach  PPE: gloves, surgical mask, eyewear protection

## 2022-01-19 NOTE — PLAN OF CARE
Assessment: Marjorie FLORENTINO presents with functional mobility impairments which indicate the need for skilled intervention. Tolerating session today without incident. Pt still having probs with fine motor and coordination throughout. Worked on reciprocal movements with emphasis on incr end ranges all 4 extremities. Req vc's to slow down and incr time/wt on BLE to improve stride and heelstrike. Still easily challenged. Would benefit from acute rehab at [place]. Will continue to follow and progress as tolerated.

## 2022-01-19 NOTE — CASE MANAGEMENT/SOCIAL WORK
Continued Stay Note  [facility]     Patient Name: Marjorie FLORENTINO  MRN: [id number]  Today's Date: 1/19/2022    Admit Date: 1/11/2022     Discharge Plan     Row Name 01/19/22 1618       Plan    Plan DC Plan: [facility] accepted, pending workmans comp.    Plan Comments Received text message from Dillon sanders at [facility] that patient is accepted, pending workmans comp paperwork.  MD notified by secure chat.                    Expected Discharge Date and Time     Expected Discharge Date Expected Discharge Time    Jan 20, 2022             Carlene Pink RN

## 2022-01-19 NOTE — PLAN OF CARE
Goal Outcome Evaluation:              Outcome Summary: Pt is still having difficulty with swallowing but has been able to take some food in. She is up moving in room and pain is under control.

## 2022-01-19 NOTE — PROGRESS NOTES
Neurosurgery Progress Note      Patient: Marjorie FLORENTINO    YOB: 1965    Date of Admission: 1/11/2022  3:26 PM    Status Post: C3-5 ACDF on 1/14/2022 with Dr. Meyers    Admitting Dx: Back pain [M54.9]  Acute bilateral low back pain, unspecified whether sciatica present [M54.50]  Cervical myelopathy (HCC) [G95.9]      Subjective     Chief Complaint: Neck pain, upper extremity weakness, dysphagia    HPI:  Patient is a 56 y.o. female on POD 5 for ACDF at C3-5 with Dr. Meyers.  Today she is up in her chair. patient reports very mild improvement in upper extremity weakness but no change in fine motor task.  She continues to have some dysphagia but was able to eat an entire cup of applesauce this morning with mild to moderate difficulty.  Patient uses Yankauer suction when eating to prevent aspiration.  Dietary consult was performed yesterday and confirmed recommendations for regular diet and canceling TPN until medically necessary.  Patient agreeable to this plan.    Current Medications:  Scheduled Meds:dexamethasone, 4 mg, Intravenous, Q6H  enoxaparin, 40 mg, Subcutaneous, Daily  hydroCHLOROthiazide, 25 mg, Oral, Daily  lidocaine, 1 patch, Transdermal, Q24H  losartan, 25 mg, Oral, Daily  senna-docusate sodium, 2 tablet, Oral, BID  sertraline, 25 mg, Oral, Daily  sodium chloride, 10 mL, Intravenous, Q12H  sodium chloride, 10 mL, Intravenous, Q12H      Continuous Infusions:sodium chloride, 100 mL/hr, Last Rate: 100 mL/hr (01/18/22 4895)      PRN Meds: •  acetaminophen  •  senna-docusate sodium **AND** polyethylene glycol **AND** bisacodyl **AND** bisacodyl  •  bisacodyl  •  Calcium Gluconate-NaCl **AND** calcium gluconate **AND** Calcium, Ionized  •  cyclobenzaprine  •  docusate sodium  •  HYDROcodone-acetaminophen  •  HYDROmorphone **AND** naloxone  •  magnesium hydroxide  •  magnesium sulfate **OR** magnesium sulfate **OR** magnesium sulfate  •  melatonin  •  ondansetron  •  potassium chloride **OR**  potassium chloride **OR** potassium chloride  •  potassium phosphate infusion greater than 15 mMoles **OR** potassium phosphate infusion greater than 15 mMoles **OR** potassium phosphate **OR** sodium phosphate IVPB **OR** sodium phosphate IVPB **OR** sodium phosphate IVPB  •  sodium chloride  •  sodium chloride    Objective     Vitals:    01/18/22 0730 01/18/22 1249 01/18/22 2103 01/19/22 0500   BP:  149/85 148/92 153/77   BP Location:  Right arm Right arm Right arm   Patient Position:  Lying Lying Lying   Pulse: 78 70 75 81   Resp: 16 17 16 15   Temp: 97.6 °F (36.4 °C) 97.8 °F (36.6 °C) 97.7 °F (36.5 °C) 97.9 °F (36.6 °C)   TempSrc: Oral Oral Oral Oral   SpO2: 96% 92% 92% 94%   Weight:    89.8 kg (197 lb 14.4 oz)   Height:             Physical Exam:   General Appearance:    Awake, cooperative, in no acute distress   HEENT:        Pulmonary:    Normocephalic. PERRLA, EOM intact. Neck incision is well approximated clean and dry, moderate swelling on the right side of neck around the incision with no bleeding drainage or redness.  Trachea midline.  No LAD.    Normal respiratory rate and effort   Abdomen:     NT/ND   MSK:     Normal ROM throughout, no joint swelling, redness, or tenderness.   Skin:   Warm and dry, no bleeding, bruising, or rash   Neurologic:   A/O x3, CN II-XII grossly intact, normal sensation    throughout, 4/5 strength upper and lower extremities, slightly improved compared to yesterday.  Positive Мария sign bilaterally. Negative    clonus.       RESULTS REVIEW:  Results from last 7 days   Lab Units 01/19/22  0330 01/18/22  0336 01/17/22  0336   WBC 10*3/mm3 7.90 8.20 8.80   HEMOGLOBIN g/dL 11.5* 10.8* 10.7*   HEMATOCRIT % 34.3 31.7* 31.3*   PLATELETS 10*3/mm3 337 311 305        Results from last 7 days   Lab Units 01/19/22  0330 01/18/22  0336 01/17/22  0336   SODIUM mmol/L 140 144 143   POTASSIUM mmol/L 4.0 3.4* 4.1   CHLORIDE mmol/L 102 104 106   CO2 mmol/L 24.0 29.0 27.0   BUN mg/dL 17 21* 19    CREATININE mg/dL 0.51* 0.56* 0.50*   CALCIUM mg/dL 9.2 9.2 9.1   BILIRUBIN mg/dL 0.3  --   --    ALK PHOS U/L 51  --   --    ALT (SGPT) U/L 8  --   --    AST (SGOT) U/L 13  --   --    GLUCOSE mg/dL 97 84 121*        Imaging Results (Last 48 Hours)     Procedure Component Value Units Date/Time    FL Video Swallow With Speech Single Contrast [[id number]] Collected: 01/18/22 0947     Updated: 01/18/22 0951    Narrative:      DATE OF EXAM:  1/18/2022 9:14 AM     PROCEDURE:  FL VIDEO SWALLOW W SPEECH SINGLE-CONTRAST-     INDICATIONS:  failed swallow study; M54.50-Low back pain, unspecified; M50.00-Cervical  disc disorder with myelopathy, unspecified cervical region     COMPARISON:  No Comparisons Available     TECHNIQUE:   This examination was performed in conjunction with speech pathology.  Lateral video fluoroscopic evaluation of the swallowing mechanism was  performed while correlate administering to the patient various  consistency food items mixed with barium.     Fluoroscopic Time:   0.4 minutes     FINDINGS:  Single spot fluoroscopic series image was obtained during a modified  barium swallow study. The study was performed by dedicated speech  pathologist. I was present during the entire examination.        Impression:      Modified barium swallow study with fluoroscopy. Please refer to the  speech pathologist report for findings and dietary recommendations.     Electronically Signed By-Luz Jarvis MD On:1/18/2022 9:49 AM  This report was finalized on [id number] by  Luz Jarvis MD.             Assessment     MDM: This is a 56-year-old WF on POD 5 from ACDF C3-5 with Dr. Meyers.  Patient small but noticeable improvement in upper extremity strength however her fine motor coordination in her hands remains unchanged.  Patient swallowing difficulties thoroughly evaluated and thought to be within upper range of normal for post ACDF prevertebral edema.  Patient was able to eat an entire cup of applesauce this  morning.  She has been working with physical therapy and doing well.  Planning for inpatient rehab pending further improvement in dysphagia.  Recommend continue medical management, normal diet with soft foods as tolerated, PT/OT.  I discussed my assessment recommendations with the patient.        Plan     - Continue medical management, pain management, regular diet with soft foods as tolerated  - Continue working with PT/OT  - Planning for rehab pending improvement in dysphagia  - Call with any questions or concerns    I discussed my assessment and recommendations with Dr. Meyers      Date: 1/19/2022    VON Ly  10:39 EST

## 2022-01-19 NOTE — PLAN OF CARE
Goal Outcome Evaluation:  Plan of Care Reviewed With: patient        Progress: no change  Outcome Summary: Patient is not tolerating low res soft diet, unable to swallow without coughing and having garbled speech.  With small sips of water patient is coughing and having garbled speech.  using suction to manage secretions due to difficulty swallowing.  Switched to ice chips for this shift due to complications with swallowing, will continue to monitor.

## 2022-01-20 ENCOUNTER — APPOINTMENT (OUTPATIENT)
Dept: GENERAL RADIOLOGY | Facility: HOSPITAL | Age: 57
End: 2022-01-20

## 2022-01-20 ENCOUNTER — APPOINTMENT (OUTPATIENT)
Dept: CT IMAGING | Facility: HOSPITAL | Age: 57
End: 2022-01-20

## 2022-01-20 LAB
ALBUMIN SERPL-MCNC: 3.8 G/DL (ref 3.5–5.2)
ALBUMIN/GLOB SERPL: 1.4 G/DL
ALP SERPL-CCNC: 51 U/L (ref 39–117)
ALT SERPL W P-5'-P-CCNC: 9 U/L (ref 1–33)
ANION GAP SERPL CALCULATED.3IONS-SCNC: 12 MMOL/L (ref 5–15)
AST SERPL-CCNC: 12 U/L (ref 1–32)
BASOPHILS # BLD AUTO: 0 10*3/MM3 (ref 0–0.2)
BASOPHILS NFR BLD AUTO: 0.1 % (ref 0–1.5)
BILIRUB SERPL-MCNC: 0.3 MG/DL (ref 0–1.2)
BUN SERPL-MCNC: 16 MG/DL (ref 6–20)
BUN/CREAT SERPL: 29.6 (ref 7–25)
CA-I SERPL ISE-MCNC: 1.26 MMOL/L (ref 1.2–1.3)
CALCIUM SPEC-SCNC: 8.8 MG/DL (ref 8.6–10.5)
CHLORIDE SERPL-SCNC: 103 MMOL/L (ref 98–107)
CO2 SERPL-SCNC: 23 MMOL/L (ref 22–29)
CREAT SERPL-MCNC: 0.54 MG/DL (ref 0.57–1)
DEPRECATED RDW RBC AUTO: 41.1 FL (ref 37–54)
EOSINOPHIL # BLD AUTO: 0 10*3/MM3 (ref 0–0.4)
EOSINOPHIL NFR BLD AUTO: 0.1 % (ref 0.3–6.2)
ERYTHROCYTE [DISTWIDTH] IN BLOOD BY AUTOMATED COUNT: 12.6 % (ref 12.3–15.4)
GFR SERPL CREATININE-BSD FRML MDRD: 117 ML/MIN/1.73
GLOBULIN UR ELPH-MCNC: 2.7 GM/DL
GLUCOSE SERPL-MCNC: 112 MG/DL (ref 65–99)
HCT VFR BLD AUTO: 32.8 % (ref 34–46.6)
HGB BLD-MCNC: 11.4 G/DL (ref 12–15.9)
LYMPHOCYTES # BLD AUTO: 0.8 10*3/MM3 (ref 0.7–3.1)
LYMPHOCYTES NFR BLD AUTO: 9.7 % (ref 19.6–45.3)
MAGNESIUM SERPL-MCNC: 1.7 MG/DL (ref 1.6–2.6)
MCH RBC QN AUTO: 33 PG (ref 26.6–33)
MCHC RBC AUTO-ENTMCNC: 34.9 G/DL (ref 31.5–35.7)
MCV RBC AUTO: 94.6 FL (ref 79–97)
MONOCYTES # BLD AUTO: 0.3 10*3/MM3 (ref 0.1–0.9)
MONOCYTES NFR BLD AUTO: 3.9 % (ref 5–12)
NEUTROPHILS NFR BLD AUTO: 7.5 10*3/MM3 (ref 1.7–7)
NEUTROPHILS NFR BLD AUTO: 86.2 % (ref 42.7–76)
NRBC BLD AUTO-RTO: 0.1 /100 WBC (ref 0–0.2)
PHOSPHATE SERPL-MCNC: 3.6 MG/DL (ref 2.5–4.5)
PLATELET # BLD AUTO: 351 10*3/MM3 (ref 140–450)
PMV BLD AUTO: 7.9 FL (ref 6–12)
POTASSIUM SERPL-SCNC: 3.8 MMOL/L (ref 3.5–5.2)
PROT SERPL-MCNC: 6.5 G/DL (ref 6–8.5)
RBC # BLD AUTO: 3.47 10*6/MM3 (ref 3.77–5.28)
SODIUM SERPL-SCNC: 138 MMOL/L (ref 136–145)
WBC NRBC COR # BLD: 8.7 10*3/MM3 (ref 3.4–10.8)

## 2022-01-20 PROCEDURE — 85025 COMPLETE CBC W/AUTO DIFF WBC: CPT | Performed by: NURSE PRACTITIONER

## 2022-01-20 PROCEDURE — 72110 X-RAY EXAM L-2 SPINE 4/>VWS: CPT

## 2022-01-20 PROCEDURE — 25010000002 HYDROMORPHONE PER 4 MG

## 2022-01-20 PROCEDURE — 80053 COMPREHEN METABOLIC PANEL: CPT | Performed by: NURSE PRACTITIONER

## 2022-01-20 PROCEDURE — 84100 ASSAY OF PHOSPHORUS: CPT | Performed by: NURSE PRACTITIONER

## 2022-01-20 PROCEDURE — 0 MAGNESIUM SULFATE 4 GM/100ML SOLUTION: Performed by: INTERNAL MEDICINE

## 2022-01-20 PROCEDURE — 82330 ASSAY OF CALCIUM: CPT | Performed by: NURSE PRACTITIONER

## 2022-01-20 PROCEDURE — 25010000002 ENOXAPARIN PER 10 MG

## 2022-01-20 PROCEDURE — 72040 X-RAY EXAM NECK SPINE 2-3 VW: CPT

## 2022-01-20 PROCEDURE — 83735 ASSAY OF MAGNESIUM: CPT | Performed by: NURSE PRACTITIONER

## 2022-01-20 PROCEDURE — 70450 CT HEAD/BRAIN W/O DYE: CPT

## 2022-01-20 PROCEDURE — 25010000002 DEXAMETHASONE PER 1 MG: Performed by: INTERNAL MEDICINE

## 2022-01-20 PROCEDURE — 99231 SBSQ HOSP IP/OBS SF/LOW 25: CPT | Performed by: INTERNAL MEDICINE

## 2022-01-20 RX ADMIN — SODIUM CHLORIDE, PRESERVATIVE FREE 10 ML: 5 INJECTION INTRAVENOUS at 21:27

## 2022-01-20 RX ADMIN — DEXAMETHASONE SODIUM PHOSPHATE 4 MG: 4 INJECTION, SOLUTION INTRA-ARTICULAR; INTRALESIONAL; INTRAMUSCULAR; INTRAVENOUS; SOFT TISSUE at 22:46

## 2022-01-20 RX ADMIN — HYDROMORPHONE HYDROCHLORIDE 0.5 MG: 2 INJECTION, SOLUTION INTRAMUSCULAR; INTRAVENOUS; SUBCUTANEOUS at 01:31

## 2022-01-20 RX ADMIN — SODIUM CHLORIDE, PRESERVATIVE FREE 10 ML: 5 INJECTION INTRAVENOUS at 22:46

## 2022-01-20 RX ADMIN — DEXAMETHASONE SODIUM PHOSPHATE 4 MG: 4 INJECTION, SOLUTION INTRA-ARTICULAR; INTRALESIONAL; INTRAMUSCULAR; INTRAVENOUS; SOFT TISSUE at 12:05

## 2022-01-20 RX ADMIN — MAGNESIUM SULFATE HEPTAHYDRATE 4 G: 40 INJECTION, SOLUTION INTRAVENOUS at 12:02

## 2022-01-20 RX ADMIN — HYDROMORPHONE HYDROCHLORIDE 0.5 MG: 2 INJECTION, SOLUTION INTRAMUSCULAR; INTRAVENOUS; SUBCUTANEOUS at 21:43

## 2022-01-20 RX ADMIN — SENNOSIDES AND DOCUSATE SODIUM 2 TABLET: 8.6; 5 TABLET ORAL at 09:25

## 2022-01-20 RX ADMIN — HYDROCODONE BITARTRATE AND ACETAMINOPHEN 1 TABLET: 5; 325 TABLET ORAL at 15:10

## 2022-01-20 RX ADMIN — DEXAMETHASONE SODIUM PHOSPHATE 4 MG: 4 INJECTION, SOLUTION INTRA-ARTICULAR; INTRALESIONAL; INTRAMUSCULAR; INTRAVENOUS; SOFT TISSUE at 18:14

## 2022-01-20 RX ADMIN — HYDROCHLOROTHIAZIDE 25 MG: 25 TABLET ORAL at 09:25

## 2022-01-20 RX ADMIN — ENOXAPARIN SODIUM 40 MG: 40 INJECTION SUBCUTANEOUS at 18:14

## 2022-01-20 RX ADMIN — LIDOCAINE 1 PATCH: 50 PATCH TOPICAL at 21:27

## 2022-01-20 RX ADMIN — DEXAMETHASONE SODIUM PHOSPHATE 4 MG: 4 INJECTION, SOLUTION INTRA-ARTICULAR; INTRALESIONAL; INTRAMUSCULAR; INTRAVENOUS; SOFT TISSUE at 05:38

## 2022-01-20 RX ADMIN — LOSARTAN POTASSIUM 25 MG: 25 TABLET, FILM COATED ORAL at 09:25

## 2022-01-20 RX ADMIN — CYCLOBENZAPRINE 10 MG: 10 TABLET, FILM COATED ORAL at 15:10

## 2022-01-20 RX ADMIN — SERTRALINE 25 MG: 25 TABLET, FILM COATED ORAL at 09:25

## 2022-01-20 RX ADMIN — HYDROMORPHONE HYDROCHLORIDE 0.5 MG: 2 INJECTION, SOLUTION INTRAMUSCULAR; INTRAVENOUS; SUBCUTANEOUS at 03:25

## 2022-01-20 NOTE — PROGRESS NOTES
Subjective:  Patient up in chair eating applesauce.  She states she was able to eat most of an omelette yesterday.  Swallowing difficulties still present but not worsening.  Incision clean and dry no signs of infection or dehiscence.  Patient excepted at [facility] per .  Still difficulties with fine motor tasks in her upper extremities confirmed with PT.  Patient understands recovery of her upper extremity function will take time.  Encouraged her to do well with her PT as that we will give her the best chance at optimal recovery.  Patient was cleared by hospitalist for discharge to rehab.    Objective:  Incision clean and dry no bleeding drainage or redness  Mild improvement in upper extremity strength compared to preoperative strength  Positive Мария bilaterally, though diminished in comparison to preoperatively  Pt moves all extremities well    Assessment/plan:  Patient doing well postoperatively.  Dysphagia still present but stable.  PO intake satisfactory for discharge.  Patient cleared by hospitalist for discharge to rehab. Patient is agreeable to be d/c'd.  Accepted by [facility].  Okay from neurosurgical perspective for discharge to rehab.  Neurosurgery will sign off.  She needs to follow-up as an outpatient with neurosurgery in 2 weeks with Dr. Meyers.    - Patient okay for discharge to rehab from neurosurgical perspective  - Patient to follow-up with outpatient neurosurgery clinic in 2 weeks with Dr. Meyers  - Neurosurgery will sign off  - Call with any questions or concerns    Discussed my assessment recommendations with Dr. Mira Shin PA-C

## 2022-01-20 NOTE — CONSULTS
Nutrition Services    Patient Name: Marjorie FLORENTINO  YOB: 1965  MRN: [id number]  Admission date: 1/11/2022    PPE Documentation        PPE Worn By Provider Did not enter room for this encounter   PPE Worn By Patient  N/A     PROGRESS NOTE      Encounter Information: Progress note to check on diet advancement, and to initiate calorie count. Pt's diet is newly advanced this afternoon; has not yet consumed a low residue meal, but has been taking/tolerating liquids. Calorie count has been ordered to help determine adequacy of PO intakes.       PO Diet: Diet Gastrointestinal; Low Residue   PO Supplements:    PO Intake:  Yesterday 1/17 was NPO    Today 1/18 had liquid breakfast, then low residue lunch so far:   B, 25%: applesauce, cream of rice cereal, 7.5oz coke   81 calories, 1g protein   L, 50%: cheese omelet, egg noodles   188 calories, 8g protein    Total intake so far today 1/19/22 (2 meals) = 269 kcal; 9g PRO  So far, pt is not meeting needs with PO intake.       Nutrition support orders:    Nutrition support review:        Labs (reviewed below): Reviewed; management per attending         GI Function:  Stool Output  Stool Unmeasured Occurrence: 1 (01/18/22 2100)  Bowel Incontinence: No (01/18/22 2100)  Stool Amount: moderate (01/18/22 2100)          Nutrition Intervention: Will continue to follow for calorie count.    Continue low residue diet as tolerated.     Will add Boost Plus TID (provides 1080 kcals, 42 g protein if consumed)          Results from last 7 days   Lab Units 01/19/22  0330 01/18/22  0336 01/17/22  0336   SODIUM mmol/L 140 144 143   POTASSIUM mmol/L 4.0 3.4* 4.1   CHLORIDE mmol/L 102 104 106   CO2 mmol/L 24.0 29.0 27.0   BUN mg/dL 17 21* 19   CREATININE mg/dL 0.51* 0.56* 0.50*   CALCIUM mg/dL 9.2 9.2 9.1   BILIRUBIN mg/dL 0.3  --   --    ALK PHOS U/L 51  --   --    ALT (SGPT) U/L 8  --   --    AST (SGOT) U/L 13  --   --    GLUCOSE mg/dL 97 84 121*     Results from last 7 days   Lab  Units 01/19/22  0330 01/18/22  0336 01/18/22  0336   MAGNESIUM mg/dL 1.9  --  1.8   PHOSPHORUS mg/dL 3.5   < > 3.7   HEMOGLOBIN g/dL 11.5*   < > 10.8*   HEMATOCRIT % 34.3   < > 31.7*    < > = values in this interval not displayed.     COVID19   Date Value Ref Range Status   01/11/2022 Not Detected Not Detected - Ref. Range Final     Lab Results   Component Value Date    HGBA1C 5.4 11/08/2021     RD to follow up per protocol.    Electronically signed by:  Yulisa Schofield RD  01/19/22

## 2022-01-20 NOTE — PLAN OF CARE
Goal Outcome Evaluation:  Plan of Care Reviewed With: patient        Progress: no change  Outcome Summary: Patient is still having difficulty swallowing and managing secretions, unable to tolerate crushed meds in applesauce and became very upset and agitated after.  Will continue to monitor

## 2022-01-20 NOTE — CASE MANAGEMENT/SOCIAL WORK
Continued Stay Note   [facility]     Patient Name: Marjorie FLORENTINO  MRN: [id number]  Today's Date: 1/20/2022    Admit Date: 1/11/2022     Discharge Plan     Row Name 01/20/22 1657       Plan    Plan Comments Called workmans comp  Arnav again and was able to reach him. He is requesting more neurosurgery documentation including OP note. Created DCP report and faxed via ad-hoc communication.    Row Name 01/20/22 1640       Plan    Plan DC plan: [facility] has accepted, pending workman's comp.    Plan Comments Confirmed with liaison Dillon that patient's  with workmans comp remains pending. Obtained  name and phone number, left voicemail for him to call . Haven't received return phone call.           Phone communication or documentation only - no physical contact with patient or family.      Megan Naegele, RN      Office Phone: [phone]  Office Cell: [phone]

## 2022-01-20 NOTE — PROGRESS NOTES
[facility] Medicine Services Daily Progress Note    Patient Name: Marjorie FLORENTINO  : 1965  MRN: [id number]  Primary Care Physician:  Lurdes West APRN  Date of admission: 2022      Subjective      Chief Complaint: Back pain    HPI:    Marjorie FLORENTINO is a 56 y.o. female with a history of HTN and depression who presented to the ER at [facility] on 2022 complaining of back pain, muscle spasms, urinary incontinence, and weakness. Workup in the ER was unremarkable. She was admitted to the ED Observation Unit.     22:  Neurosurgery was consulted and ordered MRI cervical spine to assess cervical myelopathy.     22:  The patient's MRI was abnormal and neurosurgery recommends surgical intervention. She was admitted to the Hospitalist group for further treatment.    22: Patient reports adequate pain control and denies specific complaints other than some difficulty swallowing and clearing her secretions and she has a suction that she is using that helps.  Neurosurgery-May start chemical DVT prophylaxis tomorrow.    2022: Patient seen and examined.  She continues to complain of difficulty swallowing and pain with swallowing.  She did use the ice pack as recommended but all the ice has melted.  Otherwise she states no new complaints.    1/15/2022: Patient seen and examined.  She continues to have difficulty swallowing mostly with liquids, but she has been able to eat soft foods.  Per discussion with nurse, patient is hesitant to do much on her own and has family assisting her more than required.      2022: Patient seen examined this morning.  This my first time seeing the patient, still having some trouble swallowing but able to take her pills okay.  No other complaints.  Awaiting rehab placement.    2022: Patient seen and examined this morning.  Still having issues with swallowing but slightly improved.  Going for VFSS today.  No other  complaints.    1/19/2022: Patient seen examined this morning.  Improving, swallowing improving.  On low residual diet per GI and neurosurgery.  Awaiting improvement in dysphagia.  No other complaints.    1/20/2022: Patient seen examined this morning.  Swallowing continues to improve slowly.  Was able to eat some eggs and pancakes yesterday.  Stable to discharge to rehab.    Denies fever, chills, chest pain, shortness of breath, abdominal pain, nausea, vomiting, diarrhea, dysuria, or dizziness.    Objective      Vitals:   Temp:  [97.5 °F (36.4 °C)-98 °F (36.7 °C)] 98 °F (36.7 °C)  Heart Rate:  [64-69] 69  Resp:  [14-18] 14  BP: (134-161)/(86-95) 161/95    Physical exam:  General: Awake, alert, NAD  Eyes: PERRL, EOMI, conjunctive are clear  Cardiovascular: Regular rate and rhythm, no murmurs  Respiratory: Clear to auscultation bilaterally, no wheezing or rales, unlabored breathing  Abdomen: Soft, nontender, positive bowel sounds, no guarding  Neurologic: A&O, CN grossly intact, moves all extremities spontaneously  Musculoskeletal: Generalized weakness noted, no deformities  Skin: Warm, dry, intact        Result Review    Result Review:  I have personally reviewed the results from the time of this admission to 1/20/2022 14:32 EST and agree with these findings:  [x]  Laboratory  []  Microbiology  [x]  Radiology  []  EKG/Telemetry   []  Cardiology/Vascular   []  Pathology  []  Old records  []  Other:      Wounds (last 24 hours)     LDA Wound     Row Name 01/20/22 0315 01/19/22 2328 01/19/22 1925       Wound 01/14/22 0847 Right neck Incision    Wound - Properties Group Placement Date: 01/14/22  -LT Placement Time: 0847  -LT Side: Right  -LT Location: neck  -LT Primary Wound Type: Incision  -LT    Dressing Appearance open to air  -AB open to air  -AB open to air  -AB    Closure Liquid skin adhesive; Open to air  -AB Liquid skin adhesive; Open to air  -AB Liquid skin adhesive; Open to air  -AB    Base clean; dry  -AB clean;  "dry  -AB clean; dry  -AB    Periwound dry; swelling; pink  -AB dry; swelling; pink  -AB dry; swelling; pink; intact  -AB    Periwound Temperature cool  -AB cool  -AB cool  -AB    Drainage Amount none  -AB none  -AB none  -AB    Retired Wound - Properties Group Date first assessed: 01/14/22  -LT Time first assessed: 0847  -LT Side: Right  -LT Location: neck  -LT Primary Wound Type: Incision  -LT          User Key  (r) = Recorded By, (t) = Taken By, (c) = Cosigned By    Initials Name Provider Type    AB Adelaida Phillips LPN Licensed Nurse    Elizabeth Costa RN Registered Nurse                  Assessment/Plan      Brief Patient Summary:  Marjorie FLORENTINO is a 56 y.o. female who presents emergency department with complaint of back pain, and \"back seizing\" that has gradually worsened since she had a fall in October 2021.  Patient reports she has had weakness in her hands, and she urinates when she stands up.  She complains of muscle spasms, and generally feeling weak.  Patient has been seen by her primary care provider, has had MRI of the brain, cervical spine and lumbar spine since her symptoms began. She denies any other bowel or bladder symptoms.    The patient underwent cervical discectomy and fusion via anterior approach on January 14, 2022.  Postoperatively PT has been working with her and are currently recommending inpatient rehab at discharge.  Patient is agreeable.         dexamethasone, 4 mg, Intravenous, Q6H  enoxaparin, 40 mg, Subcutaneous, Daily  hydroCHLOROthiazide, 25 mg, Oral, Daily  lidocaine, 1 patch, Transdermal, Q24H  losartan, 25 mg, Oral, Daily  senna-docusate sodium, 2 tablet, Oral, BID  sertraline, 25 mg, Oral, Daily  sodium chloride, 10 mL, Intravenous, Q12H  sodium chloride, 10 mL, Intravenous, Q12H       sodium chloride, 100 mL/hr, Last Rate: 100 mL/hr (01/19/22 1511)         Active Hospital Problems:  Active Hospital Problems    Diagnosis    • **Cervical myelopathy (HCC)    • Acute " bilateral low back pain    • Urinary incontinence    • Obesity (BMI 30-39.9)    • Depression    • Intervertebral cervical disc disorder with myelopathy, cervical region      Added automatically from request for surgery [id number]     • Primary hypertension      Plan:   Acute bilateral low back pain with Urinary incontinence  Cervical myelopathy   -Status post anterior cervical discectomy and fusion 1/14/2022  -Continue pain medication  -PT following, recommending inpatient rehab at discharge  -encourage IS  -Medically stable for discharge to rehab    Esophageal dysphagia  -VFSS done on 1/18 showing significant esophageal swelling, unable to pass through much  -Initially was on Decadron per neurosurgery but discontinued, resumed Decadron  -GI signed off, no plan for any endoscopy work-up at this time  -Patient well-nourished diet advanced and patient tolerating is slowly  -Okay to discharge to rehab, expect continuous improvement in dysphagia as the swelling comes down     Primary hypertension, chronic  -continue losartan and HCTZ  -monitor BP and vitals     Depression  -continue Zoloft      Obesity  -BMI 30.13 on admission  -encourage lifestyle modifications     DVT prophylaxis  -Lovenox    CODE STATUS:    Level Of Support Discussed With: Patient  Code Status (Patient has no pulse and is not breathing): CPR (Attempt to Resuscitate)  Medical Interventions (Patient has pulse or is breathing): Full Support      Disposition: Medically stable for discharge.    This patient has been examined wearing appropriate Personal Protective Equipment. 01/20/22      Electronically signed by Rose Sebastian DO, 01/20/22, 14:32 EST.  [facility]ist Team

## 2022-01-20 NOTE — DISCHARGE PLACEMENT REQUEST
"Marjorie FLORENTINO (56 y.o. Female)             Date of Birth Social Security Number Address Home Phone MRN    1965  [id number] [place] [address] [phone] [id number]    [place] Marital Status             None        Admission Date Admission Type Admitting Provider Attending Provider Department, Room/Bed    22 Emergency Renetta Gomez MD Patel, Jalaram, DO [facility] SURGICAL INPATIENT,     Discharge Date Discharge Disposition Discharge Destination                         Attending Provider: Rose Sebastian DO    Allergies: Ibuprofen, Lisinopril    Isolation: None   Infection: None   Code Status: CPR   Advance Care Planning Activity    Ht: 177.8 cm (70\")   Wt: 89.8 kg (197 lb 14.4 oz)    Admission Cmt: None   Principal Problem: Cervical myelopathy (HCC) [G95.9]                 Active Insurance as of 2022     Primary Coverage     Payor Plan Insurance Group Employer/Plan Group    WORKERS COMPENSATION MISC WORKERS COMPENSATION      Coverage Address Coverage Phone Number Coverage Fax Number Effective Dates    [address] [address] [phone]  10/22/2021 - None Entered    [place] [address]       Subscriber Name Subscriber Birth Date Member ID       MARJORIE FLORENTINO 1965 [id number]                 Emergency Contacts      (Rel.) Home Phone Work Phone Mobile Phone    TAE CHONG (Daughter) -- -- [phone]          Tierra Mukherjee APRN   Nurse Practitioner   Neurosurgery   Consults      Attested   Date of Service:  22   Creation Time:  22           Consult Orders   Inpatient Neurosurgery Consult [[id number]] ordered by David Leger MD          Attested        Attestation signed by Onofre Sorto IV, MD at 22 1124   I have reviewed this documentation and agree.         [facility]   Consult Note     Patient Name: Marjorie FLORENTINO  : 1965  MRN: [id number]  Primary Care Physician:  Lurdes West, " "EZIO  Referring Physician: EZIO Moise  Date of admission: 1/11/2022     Inpatient Neurosurgery Consult  Consult performed by: Tierra Mukherjee APRN  Consult ordered by: David Leger MD           Subjective      Subjective      Reason for Consult/ Chief Complaint: Neck and back pain     History of Present Illness  Marjorie FLORENTINO is a 56 y.o. female who presented to [facility] emergency department last evening with complaints of neck and back pain and hand and leg weakness and urinary incontinence.  Patient reports suffering a fall in October at work that precipitated her symptoms.  She has been managed with her PCP and had neural axis imaging completed in November.  In the ED, patient's neurological exam was benign but patient has been unable to get out of bed unassisted due to pain and discomfort.  Neurosurgery has been consulted for further assessment/evaluation and to offer recommendations.  Patient reports falling in November and where she describes hyper extension of her neck.  She has continued with neck and back pain with neck pain being worse.  She underwent imaging immediately after the fall.  Since the fall patient has had a progressive decline in her ability to ambulate, with her dexterity, with her arm strength, and with her leg strength.  She feels like she has to look at her feet to get them to move.  She describes a \"buzzing \"in her left leg and in her chin.  She describes having problems swallowing.  He also reports urinary issues.  She reports taking only Tylenol.     Review of Systems   HENT: Positive for trouble swallowing.    Genitourinary: Positive for urgency.   Musculoskeletal: Positive for back pain, gait problem and neck pain.   Neurological: Positive for weakness.   All other systems reviewed and are negative.        Personal History      Medical History        Past Medical History:   Diagnosis Date   • Hypertension              Surgical History         Past Surgical " History:   Procedure Laterality Date   •  SECTION         x3             Family History: family history includes COPD in her mother; Coronary artery disease in her mother; Hypertension in her father and sister; Kidney disease in her father; Stroke in her father, maternal grandmother, and paternal grandmother. Otherwise pertinent FHx was reviewed and not pertinent to current issue.     Social History:  reports that she has never smoked. She has never used smokeless tobacco. Drug use questions deferred to the physician. She reports that she does not drink alcohol.     Home Medications:   Turmeric, Vitamin D, baclofen, hydroCHLOROthiazide, losartan, magnesium gluconate, and sertraline     Allergies:       Allergies   Allergen Reactions   • Ibuprofen Angioedema   • Lisinopril Hives               Objective       Objective      Vitals:  Temp:  [97.9 °F (36.6 °C)-99.3 °F (37.4 °C)] 98.7 °F (37.1 °C)  Heart Rate:  [64-98] 64  Resp:  [18] 18  BP: (115-167)/(62-90) 115/71     Physical Exam  HENT:      Head: Normocephalic.   Cardiovascular:      Rate and Rhythm: Tachycardia present.   Pulmonary:      Effort: Pulmonary effort is normal.   Abdominal:      Palpations: Abdomen is soft.   Musculoskeletal:      Right hand: Decreased strength of finger abduction.      Left hand: Decreased strength of finger abduction.      Cervical back: Pain with movement and muscular tenderness present.      Lumbar back: Tenderness present.   Skin:     Capillary Refill: Capillary refill takes less than 2 seconds.   Neurological:      Mental Status: She is alert and oriented to person, place, and time.      Cranial Nerves: Cranial nerves are intact.      Sensory: Sensation is intact.      Motor: Weakness present.      Coordination: Coordination is intact.      Gait: Gait abnormal.      Deep Tendon Reflexes: Reflexes abnormal.      Reflex Scores:       Tricep reflexes are 3+ on the right side and 3+ on the left side.       Bicep reflexes are  3+ on the right side and 3+ on the left side.       Brachioradialis reflexes are 3+ on the right side and 3+ on the left side.       Patellar reflexes are 4+ on the right side and 4+ on the left side.       Achilles reflexes are 3+ on the right side and 3+ on the left side.  Psychiatric:         Attention and Perception: Attention normal.         Mood and Affect: Mood is depressed. Affect is tearful.      Strong positive Мария  Clonus with some spasticity              Result Review       Result Review:  I have personally reviewed the results from the time of this admission to 1/12/2022 08:32 EST and agree with these findings:  []?  Laboratory  []?  Microbiology  [x]?  Radiology  []?  EKG/Telemetry   []?  Cardiology/Vascular   []?  Pathology  []?  Old records  []?  Other:  Most notable findings include: MRI of the cervical spine demonstrated a focal disc protrusion at C4-C5 with moderate canal stenosis.  No cord compression.  Lumbar spine MRI and x-rays demonstrate widespread osteopenia and moderate degenerative disc disease and moderate to advanced degenerative facet changes resulting in varying degrees of neuroforaminal narrowing worse on the left at L4-L5 and right L2-L3 with no significant canal stenosis.  There is degenerative retrolisthesis at L4-L5.  Levocurvature with 27.9 degree curve.           Assessment/Plan      Assessment / Plan      Brief Patient Summary:  Marjorie FLORENTINO is a 56 y.o. female who presented to [facility] emergency department with complaints of continued neck, back pain as well as arm and leg weakness after suffering a fall hyperextending her neck.  Patient has had progressive neurologic decline in her upper and lower extremities with loss of dexterity, loss of  strength, gait issues.  Patient is severly myelopathic with MJOA score of 10 which does not necessarily correlate on current imaging.  Patient will need new imaging of her neck to evaluate for cord compression or  signal change suggestive of cord contusion.  Further recommendations after review of imaging.        Active Hospital Problems:       Active Hospital Problems     Diagnosis     • Back pain        Plan:         MRI cervical spine with and without contrast     This patient was examined wearing appropriate personal protective equipment.      Patient findings were discussed with patient, and Dr. Sorto.     EZIO Quevedo                    Cosigned by: Onofre Sorto IV, MD at 01/17/22 1124         Max Meyers MD   Physician   Neurosurgery   Progress Notes      Signed   Date of Service:  01/13/22 2044   Creation Time:  01/13/22 2044           Signed          Neurosurgical progress note:     Subjective:  Patient was seen and examined in the emergency department this evening personally.  She is doing well.  Her myelopathy remained stable although as noted in previous documentation it is severe.  I answered her questions about the planned two-level anterior cervical discectomy and fusion.  She understands to not eat or drink anything after midnight tonight.     Subjective:  GCS: 465 without any cognitive deficits.  Moves everything with good overall strength.  She has significant coordination and proprioceptive difficulties.  She has very brisk bilateral Мария's response.  She is hyperreflexive globally.  She does appear to have mild clonus.  I deferred ambulation evaluation.  Her modified [employer] myelopathy score appears to be 10 which makes her severe.     Her imaging is suggestive of cervical depression at C3-C4 and C4-C5.  This is severe at C4-C5.  In order to decompress her spinal canal we recommend a two-level ACDF.     Assessment/plan:  The patient will go to the operating room tomorrow for two level cervical disc removal/fusion.  N.p.o. at midnight.  Hold DVT prophylaxis.                Max Meyers MD   Physician   Neurosurgery   Op Note      Signed   Date of Service:  01/14/22  0847   Creation Time:  01/17/22 1358           Case Time:  Procedures:  Surgeons:    01/14/22 0847 CERVICAL DISCECTOMY ANTERIOR WITH FUSION C3-C5    Onofre Sorto IV, MD Snyder, Max CHAND MD               Signed            Neurosurgical operative report:     Patient name: Marjorie Charles  MRN: [id number]  Date of procedure: January 14, 2022     Preoperative diagnosis: C3-C4-C5 herniated cervical disc causing spinal cord compression and cervical myelopathy     Postoperative diagnosis: C3-C4-C5 herniated cervical disc causing spinal cord compression and cervical myelopathy     Procedure: C3-C4-C5 anterior cervical discectomy and fusion, intraoperative microscope, intraoperative electrophysiologic neuromonitoring     Surgeon: Dr. Max Meyers  Assistant: Estuardo Shin,  Anesthesia: General endotracheal anesthetic  Specimens: None  Blood loss: 25 cc  Drains: None  Complications: None     Indications: This is a 56-year-old woman who presented to the emergency department with complaints of neck and back pain as well as hand and leg weakness and urinary incontinence.  She did suffer a fall in October and her symptoms initiated with this.  Upon close discussion with her she had signs and symptoms of myelopathy acute including difficulty with manual dexterity, Мария's and significant balance issues.  Her imaging was concerning for significant spinal canal stenosis at C3-C4 and C4-C5.  After explanation with her about the pathology and its connection to her myelopathic symptoms we offered surgical decompression.  After explanation of the risks and benefits she elected to proceed.     Description of procedure:  The patient was seen and examined in the preoperative holding area via name and medical record number.  Her history, physical exam, consent were all reviewed and found to be correct and appropriate for proceeding with surgery.  She was marked of the intended surgical site.  She was brought back to the operating room and  handed over to anesthesia for induction of general endotracheal anesthetic.  She was transferred from the hospital bed to the operating room table in the supine position.  Baseline motor and sensory neuro monitoring status was established.  Shoulder bump was placed and incision was marked out based on anatomic and radiographic guidance.  Post positioning neuro monitoring status was stable.  At this time the surgeon scrubbed and the area was prepped and draped in the usual sterile fashion.  Timeout was performed and all categories were found to be correct and appropriate for proceeding with surgery.  Local anesthetic was instilled.  Incision was made with 15 blade and carried through skin and subcutaneous tissue.  Supraplatysmal dissection was accomplished with Metzenbaum scissors.  The platysma was coagulated in a longitudinal fashion and opened up with Metzenbaum scissors.  Subplatysmal dissection was accomplished with Metzenbaum scissors and digital dissection.  The sternocleidomastoid and strap muscles were identified and a plane between them was created.  The trachea and esophagus were confirmed to be midline and the carotid was confirmed to be lateral to this dissection plane.  Hand-held retractors were brought into provide adequate visualization and the anterior vertebral body was palpated.  Kitners were utilized to clear off the prevertebral fascia.  A spinal needle was placed into the intervertebral disc and this was confirmed to be the proper level.  The location was marked with Bovie and the spinal needle was removed.  The anterior vertebral body was cleared off with Bovie electrocautery at C3, C4, C5.  Longus coli muscle was taken down bilaterally and self-retaining retractors were brought into provide adequate visualization.  [place] pins were placed at the C3 and C4 vertebral levels under radiographic guidance.  The vertebral bodies were distracted and microscope was brought on the field.  Annulus was  entered with an 11 blade and discectomy was performed with multiple grasping instruments.  This was carried down to the posterior longitudinal ligament.  Sharp nerve hook was utilized to create a plane and Kerrison bone punches were utilized to open up the posterior longitudinal ligament.  Curettes were utilized to prep the endplates bilaterally and confirm adequate decompression centrally.  Blunt nerve hook was utilized to assist in confirmation of decompression centrally as well as in the lateral recess.  A 7 mm 7 degree lordotic cage was deemed to be appropriate size and packed with I factor.  It was placed in the intervertebral space and distraction was removed.  This was placed under radiographic guidance.  The C3 distraction pin was removed and hemostasis was obtained.  The self-retaining retractors were repositioned to provide adequate visualization at the lower level.  The [place] pin was replaced at the C5 level and distraction across this joint was accomplished.  Discectomy and placement of the cage was accomplished with the same technique and at this level an 8 mm 7 degree lordotic cage was deemed to be appropriate in size.  It was also packed with I factor.  It was placed under radiographic guidance as well.  The distraction pins were removed and hemostasis was obtained.  The wound was copiously irrigated.  34 mm extend the plate was deemed to be appropriate size and positioned properly under radiographic guidance over the disc spaces.  This was secured with self drilling 12 mm screws and they were locked with the locking mechanism.  The self-retaining retractors were removed and the wound was copiously irrigated.  Hemostasis was obtained.  The platysma was closed with interrupted 3-0 Vicryl stitches.  The subcutaneous tissue was closed with interrupted 3-0 Vicryl stitches.  The skin was closed with a running Monocryl and the skin was glued.  This was covered with a dressing.  The drapes were taken down  and the patient was extubated intraoperatively.  She was transferred back to the hospital bed and transported to the postanesthesia care unit.  At the time of her being dropped off at that location no complications were evident.  At the end of the case all counts were found to be correct.  I was present and scrubbed for all key portions of the procedure.  There were no changes in motor or sensory exam throughout the entirety of the case.  Estuardo Shin was present and scrubbed for the entirety of the case and essential for successful completion of the case.  End of dictation.  Thank very much.                  Max Meyers MD   Physician   Neurosurgery   Progress Notes      Signed   Date of Service:  01/14/22 1738   Creation Time:  01/14/22 1738           Signed          Neurosurgical progress note:     Subjective: Patient underwent a C3-C5 anterior cervical discectomy and fusion today.  The case went well without any intraoperative complications.  She underwent neuro monitoring throughout the entirety of the case and there were no issues on this front.  Postoperatively she does notice improvement in her sensation in her feet as well as motor function of her hands.  She does have some postoperative swallowing difficulties including some difficulty managing her own secretions.  I have let her know that this is common.  She also does have some speech hoarseness which is also common.     Objective:  Cognitively fully intact.  Moving everything spontaneously.  Swallowing difficulty evident on examination.  Does appear to have improved dexterity with her hands.  Incision is clean dry and dressed.     Assessment/plan:  This is a 56-year-old woman with progressive severe myelopathy secondary to spinal canal stenosis.  She underwent a two-level anterior cervical discectomy and fusion today to address the spinal cord compression.  She is already showing some improvement.  She does have some swallowing difficulty.  Her  incision is approximated and clean dry and dressed.     - Recommend the patient remain at 90 degrees throughout the night.  - If she has significant difficulty with swallowing extending into tomorrow my partner can consider a dose of steroids.  - X-rays have been ordered and will be reviewed.  - Recommend aggressive physical and occupational therapy  - Okay for chemical DVT prophylaxis starting tomorrow morning  - Advance diet as tolerated  - Opioids and muscle relaxers for pain  - Call with any questions or concerns.                  Tierra Mukherjee APRN   Nurse Practitioner   Neurosurgery   Progress Notes      Attested   Date of Service:  01/15/22 0727   Creation Time:  01/15/22 0727              Attested        Attestation signed by Onofre Sorto IV, MD at 01/17/22 1124   I have reviewed this documentation and agree.             Neurosurgery Progress Note        Patient: Marjorie FLORENTINO     YOB: 1965     Medical Record Number: [id number]     Attending Physician: Renetta Gomez MD     Date of Admission: 1/11/2022  3:26 PM     Status Post: C3-C5 ACDF     Post Operative Day Number: 1     Admitting Dx: Back pain [M54.9]  Acute bilateral low back pain, unspecified whether sciatica present [M54.50]  Cervical myelopathy (HCC) [G95.9]     General Appearance:  56 y.o. female awake and alert sitting up in chair in no acute distress.  Patient reports no significant difference in her preoperative symptoms but does feel her dexterity in her hands is slightly better.  She still reports difficulty swallowing but has experienced no choking.     Current Problem List:       Patient Active Problem List   Diagnosis   • Primary hypertension   • Encounter for general adult medical examination without abnormal findings   • Cervical myelopathy (HCC)   • Acute bilateral low back pain   • Urinary incontinence   • Depression   • Obesity (BMI 30-39.9)   • Intervertebral cervical disc disorder with myelopathy, cervical region              Current Medications:  Scheduled Meds:enoxaparin, 40 mg, Subcutaneous, Daily  lidocaine, 1 patch, Transdermal, Q24H  losartan, 25 mg, Oral, Daily  senna-docusate sodium, 2 tablet, Oral, BID  sertraline, 25 mg, Oral, Daily  sodium chloride, 10 mL, Intravenous, Q12H  sodium chloride, 10 mL, Intravenous, Q12H        Continuous Infusions:   PRN Meds:.•  acetaminophen  •  senna-docusate sodium **AND** polyethylene glycol **AND** bisacodyl **AND** bisacodyl  •  bisacodyl  •  cyclobenzaprine  •  docusate sodium  •  HYDROcodone-acetaminophen  •  HYDROmorphone **AND** naloxone  •  magnesium hydroxide  •  melatonin  •  ondansetron  •  sodium chloride  •  sodium chloride        Diagnostic Tests:              Lab Results (last 24 hours)      Procedure Component Value Units Date/Time      Collected: 01/15/22 0340      Updated: 01/15/22 0434                                                                                                                           Collected: 01/15/22 0340      Updated: 01/15/22 0405                Collected: 01/15/22 0340      Updated: 01/15/22 0405                                                                                                                                                                                                                                  Collected: 01/14/22 1446      Updated: 01/14/22 1522       WBC 12.70- 10*3/mm3         RBC 3.53- 10*6/mm3         Hemoglobin 11.2- g/dL         Hematocrit 32.7- %         MCV 92.6 fL         MCH 31.6 pg         MCHC 34.1 g/dL         RDW 12.7 %         RDW-SD 41.6 fl         MPV 7.1 fL         Platelets 301 10*3/mm3                        Imaging Results (Last 24 Hours)      Procedure Component Value Units Date/Time     FL C Arm During Surgery [[id number]] Resulted: 01/14/22 1356       Updated: 01/14/22 1357     XR Spine Cervical 2 or 3 View [[id number]] Resulted: 01/14/22 1355       Updated: 01/14/22 1355          Physical  Exam:   General Appearance:  Alert, cooperative, in no acute distress   Head:  Normocephalic, without obvious abnormality, atraumatic   Neck:    Incision is well approximated with no drainage.   Abdomen:   nontender, nondistended   Extremities/MSK: Moves all extremities well, no edema, no cyanosis, no             Redness no sign of DVT   Skin: No bleeding, bruising or rash   Neurologic:  Alert and oriented x3  Moving all extremities no difficulty        Vitals          Vitals:     01/14/22 2138 01/15/22 0003 01/15/22 0301 01/15/22 0440   BP: 159/84 123/75 125/74 115/72   BP Location: Right arm Right arm Right arm Right arm   Patient Position: Sitting Lying Sitting Sitting   Pulse: 103 83 81 79   Resp: 18 16   18   Temp: 98 °F (36.7 °C) 97.7 °F (36.5 °C)   98 °F (36.7 °C)   TempSrc: Oral Oral   Oral   SpO2: 98% 97%   100%   Weight: 96.5 kg (212 lb 11.2 oz)         Height:                      Assessment: 56-year-old female with progressive myelopathy secondary to spinal canal stenosis.  She is postop day 1 from two-level anterior cervical discectomy and fusion.  Patient is doing well with improvement in her dexterity and sensation.  She still complains of some fullness upon swallowing.  I will place her on a couple doses of steroids to help with swelling.  I did encourage her to chew and swallow ice as well as utilizing ice packs to her neck to also aid in reducing swelling and discomfort.  Postop x-rays are still pending.     Plan:    Decadron 4 mg every 6 X 4 doses  Continue Pain management efforts  Continue mobilization efforts  Continue incisional care  Continue DVT Prophylaxis     Discharge Plan: Pending clinical course     Date: 1/15/2022     Tierra Mukherjee, EZIO  07:28 EST               Cosigned by: Onofre Sorto IV, MD at 01/17/22 1124

## 2022-01-21 VITALS
SYSTOLIC BLOOD PRESSURE: 144 MMHG | HEIGHT: 70 IN | DIASTOLIC BLOOD PRESSURE: 89 MMHG | BODY MASS INDEX: 28.33 KG/M2 | RESPIRATION RATE: 16 BRPM | OXYGEN SATURATION: 95 % | TEMPERATURE: 98.1 F | WEIGHT: 197.9 LBS | HEART RATE: 71 BPM

## 2022-01-21 LAB
ALBUMIN SERPL-MCNC: 3.6 G/DL (ref 3.5–5.2)
ALBUMIN/GLOB SERPL: 1.3 G/DL
ALP SERPL-CCNC: 50 U/L (ref 39–117)
ALT SERPL W P-5'-P-CCNC: 8 U/L (ref 1–33)
ANION GAP SERPL CALCULATED.3IONS-SCNC: 9 MMOL/L (ref 5–15)
AST SERPL-CCNC: 12 U/L (ref 1–32)
BASOPHILS # BLD AUTO: 0 10*3/MM3 (ref 0–0.2)
BASOPHILS NFR BLD AUTO: 0.2 % (ref 0–1.5)
BILIRUB SERPL-MCNC: 0.2 MG/DL (ref 0–1.2)
BUN SERPL-MCNC: 14 MG/DL (ref 6–20)
BUN/CREAT SERPL: 30.4 (ref 7–25)
CA-I SERPL ISE-MCNC: 1.21 MMOL/L (ref 1.2–1.3)
CALCIUM SPEC-SCNC: 8.5 MG/DL (ref 8.6–10.5)
CHLORIDE SERPL-SCNC: 102 MMOL/L (ref 98–107)
CO2 SERPL-SCNC: 27 MMOL/L (ref 22–29)
CREAT SERPL-MCNC: 0.46 MG/DL (ref 0.57–1)
DEPRECATED RDW RBC AUTO: 40.3 FL (ref 37–54)
EOSINOPHIL # BLD AUTO: 0 10*3/MM3 (ref 0–0.4)
EOSINOPHIL NFR BLD AUTO: 0.1 % (ref 0.3–6.2)
ERYTHROCYTE [DISTWIDTH] IN BLOOD BY AUTOMATED COUNT: 12.5 % (ref 12.3–15.4)
GFR SERPL CREATININE-BSD FRML MDRD: 141 ML/MIN/1.73
GLOBULIN UR ELPH-MCNC: 2.8 GM/DL
GLUCOSE BLDC GLUCOMTR-MCNC: 102 MG/DL (ref 70–105)
GLUCOSE BLDC GLUCOMTR-MCNC: 117 MG/DL (ref 70–105)
GLUCOSE BLDC GLUCOMTR-MCNC: 99 MG/DL (ref 70–105)
GLUCOSE SERPL-MCNC: 116 MG/DL (ref 65–99)
HCT VFR BLD AUTO: 32.2 % (ref 34–46.6)
HGB BLD-MCNC: 11.3 G/DL (ref 12–15.9)
LYMPHOCYTES # BLD AUTO: 0.8 10*3/MM3 (ref 0.7–3.1)
LYMPHOCYTES NFR BLD AUTO: 7.5 % (ref 19.6–45.3)
MAGNESIUM SERPL-MCNC: 2 MG/DL (ref 1.6–2.6)
MCH RBC QN AUTO: 33 PG (ref 26.6–33)
MCHC RBC AUTO-ENTMCNC: 35.1 G/DL (ref 31.5–35.7)
MCV RBC AUTO: 94 FL (ref 79–97)
MONOCYTES # BLD AUTO: 0.5 10*3/MM3 (ref 0.1–0.9)
MONOCYTES NFR BLD AUTO: 4.5 % (ref 5–12)
NEUTROPHILS NFR BLD AUTO: 87.7 % (ref 42.7–76)
NEUTROPHILS NFR BLD AUTO: 9.3 10*3/MM3 (ref 1.7–7)
NRBC BLD AUTO-RTO: 0 /100 WBC (ref 0–0.2)
PHOSPHATE SERPL-MCNC: 3.3 MG/DL (ref 2.5–4.5)
PLATELET # BLD AUTO: 343 10*3/MM3 (ref 140–450)
PMV BLD AUTO: 7.4 FL (ref 6–12)
POTASSIUM SERPL-SCNC: 3.9 MMOL/L (ref 3.5–5.2)
PROT SERPL-MCNC: 6.4 G/DL (ref 6–8.5)
RBC # BLD AUTO: 3.42 10*6/MM3 (ref 3.77–5.28)
SODIUM SERPL-SCNC: 138 MMOL/L (ref 136–145)
WBC NRBC COR # BLD: 10.6 10*3/MM3 (ref 3.4–10.8)

## 2022-01-21 PROCEDURE — 80053 COMPREHEN METABOLIC PANEL: CPT | Performed by: NURSE PRACTITIONER

## 2022-01-21 PROCEDURE — 82962 GLUCOSE BLOOD TEST: CPT

## 2022-01-21 PROCEDURE — 99239 HOSP IP/OBS DSCHRG MGMT >30: CPT | Performed by: INTERNAL MEDICINE

## 2022-01-21 PROCEDURE — 85025 COMPLETE CBC W/AUTO DIFF WBC: CPT | Performed by: NURSE PRACTITIONER

## 2022-01-21 PROCEDURE — 82330 ASSAY OF CALCIUM: CPT | Performed by: NURSE PRACTITIONER

## 2022-01-21 PROCEDURE — 84100 ASSAY OF PHOSPHORUS: CPT | Performed by: NURSE PRACTITIONER

## 2022-01-21 PROCEDURE — 25010000002 HYDROMORPHONE PER 4 MG

## 2022-01-21 PROCEDURE — 83735 ASSAY OF MAGNESIUM: CPT | Performed by: NURSE PRACTITIONER

## 2022-01-21 PROCEDURE — 97530 THERAPEUTIC ACTIVITIES: CPT

## 2022-01-21 PROCEDURE — 97116 GAIT TRAINING THERAPY: CPT

## 2022-01-21 PROCEDURE — 25010000002 ENOXAPARIN PER 10 MG

## 2022-01-21 PROCEDURE — 25010000002 DEXAMETHASONE PER 1 MG: Performed by: INTERNAL MEDICINE

## 2022-01-21 RX ORDER — DEXAMETHASONE 2 MG/1
2 TABLET ORAL EVERY 12 HOURS SCHEDULED
Qty: 4 TABLET | Refills: 0
Start: 2022-01-21 | End: 2022-01-23

## 2022-01-21 RX ORDER — DEXAMETHASONE 4 MG/1
2 TABLET ORAL EVERY 12 HOURS SCHEDULED
Status: DISCONTINUED | OUTPATIENT
Start: 2022-01-21 | End: 2022-01-21 | Stop reason: HOSPADM

## 2022-01-21 RX ORDER — LIDOCAINE 50 MG/G
1 PATCH TOPICAL
Status: ON HOLD
Start: 2022-01-21 | End: 2022-03-22

## 2022-01-21 RX ORDER — CYCLOBENZAPRINE HCL 10 MG
10 TABLET ORAL 3 TIMES DAILY PRN
Start: 2022-01-21

## 2022-01-21 RX ORDER — HYDROCODONE BITARTRATE AND ACETAMINOPHEN 5; 325 MG/1; MG/1
1 TABLET ORAL EVERY 4 HOURS PRN
Start: 2022-01-21 | End: 2022-02-01

## 2022-01-21 RX ADMIN — DOCUSATE SODIUM 100 MG: 100 CAPSULE, LIQUID FILLED ORAL at 11:03

## 2022-01-21 RX ADMIN — HYDROMORPHONE HYDROCHLORIDE 0.5 MG: 2 INJECTION, SOLUTION INTRAMUSCULAR; INTRAVENOUS; SUBCUTANEOUS at 02:08

## 2022-01-21 RX ADMIN — LOSARTAN POTASSIUM 25 MG: 25 TABLET, FILM COATED ORAL at 11:04

## 2022-01-21 RX ADMIN — SENNOSIDES AND DOCUSATE SODIUM 2 TABLET: 8.6; 5 TABLET ORAL at 11:04

## 2022-01-21 RX ADMIN — HYDROCHLOROTHIAZIDE 25 MG: 25 TABLET ORAL at 11:04

## 2022-01-21 RX ADMIN — ENOXAPARIN SODIUM 40 MG: 40 INJECTION SUBCUTANEOUS at 17:15

## 2022-01-21 RX ADMIN — CYCLOBENZAPRINE 10 MG: 10 TABLET, FILM COATED ORAL at 11:04

## 2022-01-21 RX ADMIN — DEXAMETHASONE SODIUM PHOSPHATE 4 MG: 4 INJECTION, SOLUTION INTRA-ARTICULAR; INTRALESIONAL; INTRAMUSCULAR; INTRAVENOUS; SOFT TISSUE at 05:57

## 2022-01-21 RX ADMIN — SERTRALINE 25 MG: 25 TABLET, FILM COATED ORAL at 11:04

## 2022-01-21 NOTE — CASE MANAGEMENT/SOCIAL WORK
Continued Stay Note  [facility]     Patient Name: Marjorie FLORENTINO  MRN: [id number]  Today's Date: 1/21/2022    Admit Date: 1/11/2022     Discharge Plan     Row Name 01/21/22 1427       Plan    Plan Comments Received fax of workmans comp form completed and signed by Dr. Meyers.  Spoke with Arnav Sumner - the  with [employer] and emailed to him at nick@"[email or web address]  Awaiting repsonse from him to see if case approved with workmans comp.                    Expected Discharge Date and Time     Expected Discharge Date Expected Discharge Time    Jan 21, 2022             Carlene Pink RN

## 2022-01-21 NOTE — PLAN OF CARE
Goal Outcome Evaluation:     Bed mobility - N/A or Not attempted. pt up in the chair  Transfers - CGA up to roll walker.  Improved use of upper extrem to push up from seated surface.   Ambulation -  10 feet and 80 feet CGA and with rolling walker  Pt with better effort for foot placement during gait training.         Pt would benefit from Inpatient Rehabilitation placement at discharge from facility   Pt desires Inpatient Rehabilitation placement at discharge. Pt cooperative; agreeable to therapeutic recommendations and plan of care.

## 2022-01-21 NOTE — CASE MANAGEMENT/SOCIAL WORK
Continued Stay Note  [facility]     Patient Name: Marjorie FLORENTINO  MRN: [id number]  Today's Date: 1/21/2022    Admit Date: 1/11/2022     Discharge Plan     Row Name 01/21/22 1634       Plan    Plan DC Plan: PAPA accepted, can transfer today.    Plan Comments Received text message from marilyn Clinton with [facility] that patient is approved and can transfer anytime today.  Patient nurse notified by secure chat.    Row Name 01/21/22 6704       Plan    Plan Comments Received phone call from Karmen at [facility] that patient approval came through from [employer], and they are working on a few more things with [employer] - but patient should be able to transfer to [facility] today.  MD and nurse notified by secure chat.    Row Name 01/21/22 8875       Plan    Plan Comments Received fax of workmans comp form completed and signed by Dr. Meyers.  Spoke with Arnav Sumner - the  with [employer] and emailed to him at [email or web address].  Awaiting repsonse from him to see if case approved with workmans comp.                    Expected Discharge Date and Time     Expected Discharge Date Expected Discharge Time    Jan 21, 2022             Carlene Pink RN

## 2022-01-21 NOTE — PLAN OF CARE
Goal Outcome Evaluation:  Plan of Care Reviewed With: patient        Progress: no change  Outcome Summary: Pt had a fall tonight at 1930 Pt was in the bathroom floor she hit her head on the door frame and has about a 1 inch cut on the back of her head, Drs where called and I did a complete head to toes assessment no other injuries and Pt stated no pain at this time. I applied pressure to her wound to stop the bleeding Daksha Wall came and assessed the Pt and ordered to clean the wound and apply steri strips. Pt wemt for a CT of her head and xray of neck and back. Pt is now a falls risk yellow gown and braclet all alarms on and educated patient on safety. Pt has been resting well call light within reach will continue to monitor.

## 2022-01-21 NOTE — DISCHARGE SUMMARY
"             [facility] Medicine Services  DISCHARGE SUMMARY    Patient Name: Marjorie FLORENTINO  : 1965  MRN: [id number]    Date of Admission: 2022  Date of Discharge: 2022  Primary Care Physician: Lurdes West APRN      Presenting Problem:   Back pain [M54.9]  Acute bilateral low back pain, unspecified whether sciatica present [M54.50]  Cervical myelopathy (HCC) [G95.9]    Active and Resolved Hospital Problems:  Active Hospital Problems    Diagnosis POA   • **Cervical myelopathy (HCC) [G95.9] Yes   • Acute bilateral low back pain [M54.50] Yes   • Urinary incontinence [R32] Yes   • Obesity (BMI 30-39.9) [E66.9] Yes   • Depression [F32.A] Yes   • Intervertebral cervical disc disorder with myelopathy, cervical region [M50.00] Unknown   • Primary hypertension [I10] Yes      Resolved Hospital Problems   No resolved problems to display.         Hospital Course     Hospital Course:  Marjorie FLORENTINO is a 57 y.o. female who presents emergency department with complaint of back pain, and \"back seizing\" that has gradually worsened since she had a fall in 2021.  Patient reports she has had weakness in her hands, and she urinates when she stands up.  She complains of muscle spasms, and generally feeling weak.  Patient has been seen by her primary care provider, has had MRI of the brain, cervical spine and lumbar spine since her symptoms began. She denies any other bowel or bladder symptoms.    The patient underwent cervical discectomy and fusion via anterior approach on 2022.  Postoperatively PT has been working with her and are currently recommending inpatient rehab at discharge.  Patient also had issues with dysphagia postop due to postop cervical edema.  VFSS was done and also noted to have esophageal swelling, and GI was consulted.  Patient slowly advanced on diet, GI signed off, do not recommend any endoscopic work-up at this time.  Patient continued to have " improvement in dysphagia and remains on soft diet and tolerating.  Decadron continued and to finish up in few days as outpatient.  Cleared to discharge per neurosurgery.  Patient did have a mechanical fall on the night of 1/20 and has mild head laceration, repeat imaging is stable.  Evaluated by neurosurgery again and okay to discharge.  Patient is clinically improved and stable to discharge to rehab today with follow-up with PCP and neurosurgery as an outpatient.    A/P:  Acute bilateral low back pain with Urinary incontinence  Cervical myelopathy   -Status post anterior cervical discectomy and fusion 1/14/2022  -Continue pain medication  -PT following, recommending inpatient rehab at discharge  -encourage IS  -Medically stable for discharge to rehab     Esophageal dysphagia  -VFSS done on 1/18 showing significant esophageal swelling, unable to pass through much  -Initially was on Decadron per neurosurgery but discontinued, resumed Decadron and to finish up as outpatient in few days  -GI signed off, no plan for any endoscopy work-up at this time  -Patient well-nourished diet advanced and patient tolerating is slowly  -Okay to discharge to rehab, expect continuous improvement in dysphagia as the swelling comes down     Primary hypertension, chronic  -continue losartan and HCTZ  -monitor BP and vitals     Depression  -continue Zoloft      Obesity  -BMI 30.13 on admission  -encourage lifestyle modifications         DISCHARGE Follow Up Recommendations for labs and diagnostics:   Follow-up with PCP and neurosurgery    Reasons For Change In Medications and Indications for New Medications:  Medication changes as below    Day of Discharge     Vital Signs:  Temp:  [97.7 °F (36.5 °C)-98.3 °F (36.8 °C)] 98.1 °F (36.7 °C)  Heart Rate:  [61-93] 71  Resp:  [16-18] 16  BP: (138-174)/(80-98) 144/89    Physical Exam:  General: Awake, alert, NAD  Eyes: PERRL, EOMI, conjunctive are clear  Cardiovascular: Regular rate and rhythm, no  murmurs  Respiratory: Clear to auscultation bilaterally, no wheezing or rales, unlabored breathing  Abdomen: Soft, nontender, positive bowel sounds, no guarding  Neurologic: A&O, CN grossly intact, moves all extremities spontaneously  Musculoskeletal: Generalized weakness noted, no deformities  Skin: Warm, dry, intact        Pertinent  and/or Most Recent Results     LAB RESULTS:      Lab 01/21/22  0337 01/20/22  0324 01/19/22  0330 01/18/22  0336 01/17/22  0336   WBC 10.60 8.70 7.90 8.20 8.80   HEMOGLOBIN 11.3* 11.4* 11.5* 10.8* 10.7*   HEMATOCRIT 32.2* 32.8* 34.3 31.7* 31.3*   PLATELETS 343 351 337 311 305   NEUTROS ABS 9.30* 7.50* 7.10* 5.30 7.40*   LYMPHS ABS 0.80 0.80 0.60* 2.20 0.90   MONOS ABS 0.50 0.30 0.20 0.60 0.50   EOS ABS 0.00 0.00 0.00 0.10 0.00   MCV 94.0 94.6 93.4 94.0 94.0         Lab 01/21/22  0337 01/20/22 0324 01/19/22  0330 01/18/22  0336 01/17/22  0336   SODIUM 138 138 140 144 143   POTASSIUM 3.9 3.8 4.0 3.4* 4.1   CHLORIDE 102 103 102 104 106   CO2 27.0 23.0 24.0 29.0 27.0   ANION GAP 9.0 12.0 14.0 11.0 10.0   BUN 14 16 17 21* 19   CREATININE 0.46* 0.54* 0.51* 0.56* 0.50*   GLUCOSE 116* 112* 97 84 121*   CALCIUM 8.5* 8.8 9.2 9.2 9.1   IONIZED CALCIUM 1.21 1.26 1.27  --   --    MAGNESIUM 2.0 1.7 1.9 1.8  --    PHOSPHORUS 3.3 3.6 3.5 3.7  --          Lab 01/21/22  0337 01/20/22  0324 01/19/22  0330   TOTAL PROTEIN 6.4 6.5 6.7   ALBUMIN 3.60 3.80 3.80   GLOBULIN 2.8 2.7 2.9   ALT (SGPT) 8 9 8   AST (SGOT) 12 12 13   BILIRUBIN 0.2 0.3 0.3   ALK PHOS 50 51 51                     Brief Urine Lab Results  (Last result in the past 365 days)      Color   Clarity   Blood   Leuk Est   Nitrite   Protein   CREAT   Urine HCG        01/11/22 1732 Yellow   Clear   Negative   Negative   Negative   Negative               Microbiology Results (last 10 days)     Procedure Component Value - Date/Time    MRSA Screen, PCR (Inpatient) - Swab, Nares [[id number]]  (Normal) Collected: 01/13/22 1140    Lab Status: Final  result Specimen: Swab from Nares Updated: 01/13/22 1305     MRSA PCR No MRSA Detected    COVID PRE-OP / PRE-PROCEDURE SCREENING ORDER (NO ISOLATION) - Swab, Nasopharynx [[id number]]  (Normal) Collected: 01/11/22 1828    Lab Status: Final result Specimen: Swab from Nasopharynx Updated: 01/11/22 1857    Narrative:      The following orders were created for panel order COVID PRE-OP / PRE-PROCEDURE SCREENING ORDER (NO ISOLATION) - Swab, Nasopharynx.  Procedure                               Abnormality         Status                     ---------                               -----------         ------                     COVID-19,CEPHEID/ZEYAD,CO...[[id number]]  Normal              Final result                 Please view results for these tests on the individual orders.    COVID-19,CEPHEID/ZEYAD,COR/[facility]/PAD/ELAYNE IN-HOUSE(OR EMERGENT/ADD-ON),NP SWAB IN TRANSPORT MEDIA 3-4 HR TAT, RT-PCR - Swab, Nasopharynx [[id number]]  (Normal) Collected: 01/11/22 1828    Lab Status: Final result Specimen: Swab from Nasopharynx Updated: 01/11/22 1857     COVID19 Not Detected    Narrative:      Fact sheet for providers: https://www.fda.gov/media/657653/download     Fact sheet for patients: https://www.fda.gov/media/164381/download  Fact sheet for providers: https://www.fda.gov/media/966588/download    Fact sheet for patients: https://www.fda.gov/media/618800/download    Test performed by PCR.          XR Spine Cervical 2 or 3 View    Result Date: 1/20/2022  Impression: 1. Limited evaluation of the cervical spine. 2. CT scan of the cervical spine may be helpful for further evaluation in the correct clinical setting. 3. Anterior cervical discectomy with fusion C3 C5 was better demonstrated on the prior examination. Electronically signed by:  Onofre Woods M.D.  1/20/2022 9:41 PM    XR Spine Cervical 2 or 3 View    Result Date: 1/16/2022  Impression:  1. Status post anterior surgical fusion at the C3 and C4 and C5 levels with anterior metallic  plate and screws and postoperative changes in the intervertebral disc spaces. 2. Thickening of the prevertebral soft tissues measuring 2.9 cm in thickness at the C4 and C5 and C6 and C7 levels consistent with surgery on January 14, 2022 this might be caused by inflammation or possibly focal fluid collection or seroma or hematoma. Clinical correlation physical examination would BE helpful to further evaluate this finding. There is no evidence of gas bubbles in the prevertebral soft tissues in this area.  Electronically Signed By-Jaskaran Pollock MD On:1/16/2022 1:55 PM This report was finalized on [id number] by  Jaskaran Pollock MD.    CT Head Without Contrast    Result Date: 1/20/2022  Impression:  1.Normal exam. Negative for evidence of injury.  Electronically Signed By-Aleida Ryan MD On:1/20/2022 10:54 PM This report was finalized on [id number] by  Aleida Ryan MD.    MRI Cervical Spine With & Without Contrast    Result Date: 1/12/2022  Impression:  1. Multilevel cervical spondylosis, as detailed above. 2. At C3-C4, there is moderate to severe spinal canal stenosis, mild indentation of the cervical cord, moderate to severe right neural foraminal narrowing, and moderate left neural foraminal narrowing. 3. At C4-C5, there is moderate to severe spinal canal stenosis and indentation of the cervical cord. 4. At C5-C6, there is moderate to severe right neural foraminal narrowing. 5. No abnormal enhancement.   Electronically Signed By-Josh Chu MD On:1/12/2022 4:17 PM This report was finalized on [id number] by  Josh Chu MD.    FL Video Swallow With Speech Single Contrast    Result Date: 1/18/2022  Impression: Modified barium swallow study with fluoroscopy. Please refer to the speech pathologist report for findings and dietary recommendations.  Electronically Signed By-Luz Jarvis MD On:1/18/2022 9:49 AM This report was finalized on [id number] by  Luz Jarvis MD.    XR Spine Lumbar  Complete 4+VW    Result Date: 1/20/2022  Impression: 1. Negative for evidence of injury to the lumbar spine. 2. There is levoscoliosis and multilevel degenerative disc disease.  Electronically Signed By-Aleida Ryan MD On:1/20/2022 9:39 PM This report was finalized on [id number] by  Aleida Ryan MD.                  Labs Pending at Discharge:      Procedures Performed  Procedure(s):  CERVICAL DISCECTOMY ANTERIOR WITH FUSION C3-C5     Preoperative diagnosis: C3-C4-C5 herniated cervical disc causing spinal cord compression and cervical myelopathy     Postoperative diagnosis: C3-C4-C5 herniated cervical disc causing spinal cord compression and cervical myelopathy     Procedure: C3-C4-C5 anterior cervical discectomy and fusion, intraoperative microscope, intraoperative electrophysiologic neuromonitoring     Surgeon: Dr. Max Meyers  Assistant: Estuardo Shin  Anesthesia: General endotracheal anesthetic  Specimens: None  Blood loss: 25 cc  Drains: None  Complications: None     Indications: This is a 56-year-old woman who presented to the emergency department with complaints of neck and back pain as well as hand and leg weakness and urinary incontinence.  She did suffer a fall in October and her symptoms initiated with this.  Upon close discussion with her she had signs and symptoms of myelopathy acute including difficulty with manual dexterity, Мария's and significant balance issues.  Her imaging was concerning for significant spinal canal stenosis at C3-C4 and C4-C5.  After explanation with her about the pathology and its connection to her myelopathic symptoms we offered surgical decompression.  After explanation of the risks and benefits she elected to proceed.     Description of procedure:  The patient was seen and examined in the preoperative holding area via name and medical record number.  Her history, physical exam, consent were all reviewed and found to be correct and appropriate for proceeding with  surgery.  She was marked of the intended surgical site.  She was brought back to the operating room and handed over to anesthesia for induction of general endotracheal anesthetic.  She was transferred from the hospital bed to the operating room table in the supine position.  Baseline motor and sensory neuro monitoring status was established.  Shoulder bump was placed and incision was marked out based on anatomic and radiographic guidance.  Post positioning neuro monitoring status was stable.  At this time the surgeon scrubbed and the area was prepped and draped in the usual sterile fashion.  Timeout was performed and all categories were found to be correct and appropriate for proceeding with surgery.  Local anesthetic was instilled.  Incision was made with 15 blade and carried through skin and subcutaneous tissue.  Supraplatysmal dissection was accomplished with Metzenbaum scissors.  The platysma was coagulated in a longitudinal fashion and opened up with Metzenbaum scissors.  Subplatysmal dissection was accomplished with Metzenbaum scissors and digital dissection.  The sternocleidomastoid and strap muscles were identified and a plane between them was created.  The trachea and esophagus were confirmed to be midline and the carotid was confirmed to be lateral to this dissection plane.  Hand-held retractors were brought into provide adequate visualization and the anterior vertebral body was palpated.  Kitners were utilized to clear off the prevertebral fascia.  A spinal needle was placed into the intervertebral disc and this was confirmed to be the proper level.  The location was marked with Bovie and the spinal needle was removed.  The anterior vertebral body was cleared off with Bovie electrocautery at C3, C4, C5.  Longus coli muscle was taken down bilaterally and self-retaining retractors were brought into provide adequate visualization.  Sopchoppy pins were placed at the C3 and C4 vertebral levels under radiographic  guidance.  The vertebral bodies were distracted and microscope was brought on the field.  Annulus was entered with an 11 blade and discectomy was performed with multiple grasping instruments.  This was carried down to the posterior longitudinal ligament.  Sharp nerve hook was utilized to create a plane and Kerrison bone punches were utilized to open up the posterior longitudinal ligament.  Curettes were utilized to prep the endplates bilaterally and confirm adequate decompression centrally.  Blunt nerve hook was utilized to assist in confirmation of decompression centrally as well as in the lateral recess.  A 7 mm 7 degree lordotic cage was deemed to be appropriate size and packed with I factor.  It was placed in the intervertebral space and distraction was removed.  This was placed under radiographic guidance.  The C3 distraction pin was removed and hemostasis was obtained.  The self-retaining retractors were repositioned to provide adequate visualization at the lower level.  The Gresham pin was replaced at the C5 level and distraction across this joint was accomplished.  Discectomy and placement of the cage was accomplished with the same technique and at this level an 8 mm 7 degree lordotic cage was deemed to be appropriate in size.  It was also packed with I factor.  It was placed under radiographic guidance as well.  The distraction pins were removed and hemostasis was obtained.  The wound was copiously irrigated.  34 mm extend the plate was deemed to be appropriate size and positioned properly under radiographic guidance over the disc spaces.  This was secured with self drilling 12 mm screws and they were locked with the locking mechanism.  The self-retaining retractors were removed and the wound was copiously irrigated.  Hemostasis was obtained.  The platysma was closed with interrupted 3-0 Vicryl stitches.  The subcutaneous tissue was closed with interrupted 3-0 Vicryl stitches.  The skin was closed with a  running Monocryl and the skin was glued.  This was covered with a dressing.  The drapes were taken down and the patient was extubated intraoperatively.  She was transferred back to the hospital bed and transported to the postanesthesia care unit.  At the time of her being dropped off at that location no complications were evident.  At the end of the case all counts were found to be correct.  MONICA was present and scrubbed for all key portions of the procedure.  There were no changes in motor or sensory exam throughout the entirety of the case.  Estuardo Shin was present and scrubbed for the entirety of the case and essential for successful completion of the case.  End of dictation.  Thank very much.    Consults:   Consults     Date and Time Order Name Status Description    1/18/2022 10:21 AM Inpatient Gastroenterology Consult Completed     1/13/2022  9:19 AM Inpatient Hospitalist Consult      1/11/2022  7:02 PM Inpatient Neurosurgery Consult Completed             Discharge Details        Discharge Medications      New Medications      Instructions Start Date   cyclobenzaprine 10 MG tablet  Commonly known as: FLEXERIL   10 mg, Oral, 3 Times Daily PRN      dexamethasone 2 MG tablet  Commonly known as: DECADRON   2 mg, Oral, Every 12 Hours Scheduled      HYDROcodone-acetaminophen 5-325 MG per tablet  Commonly known as: NORCO   1 tablet, Oral, Every 4 Hours PRN      lidocaine 5 %  Commonly known as: LIDODERM   1 patch, Transdermal, Every 24 Hours Scheduled, Remove & Discard patch within 12 hours or as directed by MD         Continue These Medications      Instructions Start Date   hydroCHLOROthiazide 25 MG tablet  Commonly known as: HYDRODIURIL   TAKE ONE TABLET BY MOUTH DAILY      losartan 25 MG tablet  Commonly known as: Cozaar   25 mg, Oral, Daily      magnesium gluconate 500 MG tablet  Commonly known as: MAGONATE   250 mg, Oral, Daily      QC TUMERIC COMPLEX PO   Oral, Daily      sertraline 50 MG tablet  Commonly known  as: ZOLOFT   25 mg, Oral, Daily      Vitamin D 50 MCG (2000 UT) capsule   2,000 Units, Oral, Daily             Allergies   Allergen Reactions   • Ibuprofen Angioedema   • Lisinopril Hives         Discharge Disposition:  Rehab Facility or Unit (DC - External)    Diet:  Hospital:  Diet Order   Procedures   • Diet Gastrointestinal; Low Residue         Discharge Activity:         CODE STATUS:  Code Status and Medical Interventions:   Ordered at: 01/11/22 1800     Level Of Support Discussed With:    Patient     Code Status (Patient has no pulse and is not breathing):    CPR (Attempt to Resuscitate)     Medical Interventions (Patient has pulse or is breathing):    Full Support         Future Appointments   Date Time Provider Department Center   1/28/2022  9:45 AM Max Meyers MD [facility]   6/6/2022 11:00 AM Lurdes West APRN [facility] [place]           Time spent on Discharge including face to face service:  35 minutes    Signature:    Electronically signed by Rose Sebastian DO, 01/21/22, 3:59 PM EST.

## 2022-01-21 NOTE — PROGRESS NOTES
Nutrition Services    Patient Name: Marjorie FLORENTINO  YOB: 1965  MRN: [id number]  Admission date: 1/11/2022    PPE Documentation        PPE Worn By Provider Did not enter room for this encounter   PPE Worn By Patient  N/A     PROGRESS NOTE      Encounter Information: Progress note to finish estimated calorie count        PO Diet: Diet Gastrointestinal; Low Residue   PO Supplements: Boost Plus TID (provides 1080 kcals, 42 g protein if consumed)      PO Intake:  1/19   ~1275 kcals/day (74% of needs) + kcals consumed from Boost Plus supplement (avg 525 kcals/day)= 1800 kcals (100% of needs)     1/20:   ~1050 kcals/day (62% of needs)+ kcals consumed from Boost Plus supplement (Avg 525 kcals)= 1575 kcals (92% of needs)     Spoke with RN who reports on average consuming 1-2 Boosts/day. Avg additional (525 kcals/day)          Nutrition support orders:    Nutrition support review:        Labs (reviewed below): Reviewed-see below         GI Function:  + BM 1/20        Nutrition Intervention: Continue to encourage use of Boost Plus supplement which is helping patient meet caloric needs.        Results from last 7 days   Lab Units 01/21/22  0337 01/20/22  0324 01/19/22  0330   SODIUM mmol/L 138 138 140   POTASSIUM mmol/L 3.9 3.8 4.0   CHLORIDE mmol/L 102 103 102   CO2 mmol/L 27.0 23.0 24.0   BUN mg/dL 14 16 17   CREATININE mg/dL 0.46* 0.54* 0.51*   CALCIUM mg/dL 8.5* 8.8 9.2   BILIRUBIN mg/dL 0.2 0.3 0.3   ALK PHOS U/L 50 51 51   ALT (SGPT) U/L 8 9 8   AST (SGOT) U/L 12 12 13   GLUCOSE mg/dL 116* 112* 97     Results from last 7 days   Lab Units 01/21/22  0337 01/20/22  0324 01/20/22  0324 01/19/22  0330 01/19/22  0330   MAGNESIUM mg/dL 2.0  --  1.7  --  1.9   PHOSPHORUS mg/dL 3.3   < > 3.6   < > 3.5   HEMOGLOBIN g/dL 11.3*   < > 11.4*   < > 11.5*   HEMATOCRIT % 32.2*   < > 32.8*   < > 34.3    < > = values in this interval not displayed.     COVID19   Date Value Ref Range Status   01/11/2022 Not Detected Not  Detected - Ref. Range Final     Lab Results   Component Value Date    HGBA1C 5.4 11/08/2021         RD to follow up per protocol.    Electronically signed by:  Amy Patton RD  01/21/22 15:31 EST

## 2022-01-21 NOTE — PROGRESS NOTES
Pharmacist fall medication review    Subjective:  Marjorie FLORENTINO is a 56 y.o.female admitted with back pain, C3-5 ACDF on 01/14. Pharmacist to review medication list for high risk medications associated with falls and potential drug-drug interactions.    PMH: neck pain, upper extremity weakness, dysphagia    Description of the Event: Patient was in bathroom and fell against door. She stated her glasses sometimes cause her to see double vision and she began to sway and fell. Patient has a laceration on the back of her scalp but is otherwise doing fine.      Assessment:  Current medication list contains medication(s) that are considered high risk for falls. High risk medication class for falls: Antispasmodics and Opioids. No harmful drug interactions that could increase risk for falls identified.    Medications Administered within last 24 hours:  Narcotics/Opioids** and Skeletal Muscle Relaxers    Plan:  No recommendations from pharmacy at this time. Will continue to monitor for any medications or drug-drug interactions that could contribute to a fall.       Alivia Purcell, [facility]  01/20/22 20:21 EST

## 2022-01-21 NOTE — PLAN OF CARE
Patient observed with eyes open in sitting position in chair. Rise and fall of chest observed. Dressings clean dry and intact. Hourly rounding completed this shift, no complaints or needs voiced. Patient did well using applesauce to swallow pills sitting in the chair.  Occasional productive cough.

## 2022-01-21 NOTE — PLAN OF CARE
Patient reportedly fell yesterday and struck her head during fall, which caused external bleeding on her posterior scalp.  Patient states she fell due to longstanding balance issues as well as difficulty with her glasses causing depth perception issue.  No changes in vision.  Wound was cleaned and closed with Steri-Strips.  Cervical x-ray and CT head were performed both of which were benign.  No change in patient's mental status.  Patient states her swallowing difficulties have gotten significantly better, though she still does have some lingering difficulty.    - Imaging reviewed and benign  - Continue plans for discharge to rehab  - Neurosurgery signing off  - Call with any questions or concerns    I discussed my assessment and recommendations with Dr. Mira Shin PA-C

## 2022-01-21 NOTE — CASE MANAGEMENT/SOCIAL WORK
Continued Stay Note  [facility]     Patient Name: Marjorie FLORENTINO  MRN: [id number]  Today's Date: 1/21/2022    Admit Date: 1/11/2022     Discharge Plan     Row Name 01/21/22 5641       Plan    Plan Comments Received phone call from Karmen at \[facility]\"" that patient approval came through from [employer], and they are working on a few more things with [facility] - but patient should be able to transfer to \[facility]\"" today.  MD and nurse notified by secure chat.    Row Name 01/21/22 4977       Plan    Plan Comments Received fax of workmans comp form completed and signed by Dr. Meyers.  Spoke with Arnav Sumner - the  with [employer] and emailed to him at [email or web address].  Awaiting repsonse from him to see if case approved with workmans comp.                    Expected Discharge Date and Time     Expected Discharge Date Expected Discharge Time    Jan 21, 2022             Carlene Pink RN

## 2022-01-21 NOTE — THERAPY TREATMENT NOTE
Subjective: Pt agreeable to therapeutic plan of care.    Objective:     Bed mobility - N/A or Not attempted. pt up in the chair  Transfers - CGA up to roll walker.  Improved use of upper extrem to push up from seated surface.   Ambulation -  10 feet and 80 feet CGA and with rolling walker  Pt with better effort for foot placement during gait training.      Pain: 0 VAS  Education: Provided education on importance of mobility and skilled verbal / tactile cueing throughout intervention.     Assessment: Marjorie FLORENTINO presents with functional mobility impairments which indicate the need for skilled intervention. Tolerating session today without incident. Pt making slow but consistent progress with her mobility.  Pt has improve upper extrem movement but poor motor skill with bilateral hands.  Pt not safe to return home at this time.  Pt is far from her PLOF but has good potential to recover her mobility with physical therapy services.  Will continue to follow and progress as tolerated.     Plan/Recommendations:   Pt would benefit from Inpatient Rehabilitation placement at discharge from facility   Pt desires Inpatient Rehabilitation placement at discharge. Pt cooperative; agreeable to therapeutic recommendations and plan of care.     Basic Mobility 6-click:  Rollin = Total, A lot = 2, A little = 3; 4 = None  Supine>Sit:   1 = Total, A lot = 2, A little = 3; 4 = None   Sit>Stand with arms:  1 = Total, A lot = 2, A little = 3; 4 = None  Bed>Chair:   1 = Total, A lot = 2, A little = 3; 4 = None  Ambulate in room:  1 = Total, A lot = 2, A little = 3; 4 = None  3-5 Steps with railin = Total, A lot = 2, A little = 3; 4 = None  Score: 19    Modified Jerry: 4 = Moderately severe disability (Unable to attend to own bodily needs without assistance, and unable to walk unassisted)     Post-Tx Position: Up in Chair, Alarms activated and Call light and personal items within reach  PPE: gloves, surgical mask, eyewear  protection

## 2022-01-21 NOTE — NURSING NOTE
Post Fall Note    Patient: Marjorie FLORENTINO   Location: 4128/1    Time of fall: 1930    Date of fall: 01/21/22    Location of the fall: bathroom    Describe the Fall: Pt found in the floor in bathroom    Interventions in place prior to fall: Non-Skid Footwear    Was the fall witnessed? No    Where was the patient found?: Bathroom floor    Patient position when found?: Pt was sitting up patient was found by nurse Sallie    If witnessed, what part of the body made contact with the floor or other object?Head made contact with bathroom door frame NA    Injury: Yes     Is the patient having any pain?: No    Level of Consciousness: awake, alert and oriented     Post fall assessment: **Pt is A&OX4 ,VSS, About a 1 inch wound to the back of patients head Pressure applied and assessed.  called. Daksha Wall came and assessed patient gave orders to clean and steri strip wound, Pt is getting a CT of head and Lumbar and spine xray. Head to toe assessment no other injury and no complaints of pain from patient.    Physician notified: Yes    Name of physician: Dr Mccormick and [facility]    Family notified: Yes    Name of family member notified: Both Daughter where in the room    Post fall precautions in place: Call Light Within Reach, Encourage Use of Call Light, Bed Side Rails x1, x2, x3, Bed/Chair in Lowest Position, Bed/Chair Locked, Bed Alarm Set, Chair Alarm Set, Falls Wrist Band Applied, Falls Gown Applied, Fall Risk Care Plan Active, Non-Skid Footwear, Room Clutter Free, Items within Reach, Adequate Lighting Provided and Falls Risk Signage Present    Teaching provided: Educated patient on all safety precautions , all alarms and  Pt must use her call light for every need.           Electronically signed by Charlene Chaparro RN, 01/20/22, 20:32 EST.

## 2022-01-21 NOTE — PLAN OF CARE
Problem: Adult Inpatient Plan of Care  Goal: Plan of Care Review  Outcome: Ongoing, Progressing  Goal: Patient-Specific Goal (Individualized)  Outcome: Ongoing, Progressing  Goal: Absence of Hospital-Acquired Illness or Injury  Outcome: Ongoing, Progressing  Intervention: Identify and Manage Fall Risk  Description: Perform standard risk assessment with a validated tool or comprehensive approach appropriate to the patient on admission; reassess fall risk frequently, with change in status or transfer to another level of care.  Communicate fall injury risk to interprofessional healthcare team.  Determine need for increased observation, equipment and environmental modification, such as low bed and signage, as well as supportive, nonskid footwear.  Adjust safety measures to individual developmental age, stage and identified risk factors.  Reinforce the importance of safety and physical activity with patient and family.  Perform regular intentional rounding to assess need for position change, pain assessment, personal needs, including assistance with toileting.  Recent Flowsheet Documentation  Taken 1/21/2022 0745 by Adela Levine LPN  Safety Promotion/Fall Prevention:   safety round/check completed   nonskid shoes/slippers when out of bed   fall prevention program maintained  Intervention: Prevent Skin Injury  Description: Assess skin risk on admission and at regular intervals throughout hospital stay.  Keep all areas of skin (especially folds) clean and dry.  Maintain adequate skin hydration.  Relieve and redistribute pressure and protect bony prominences; implement measures based on patient-specific risk factors.  Match turning and repositioning schedule to clinical condition.  Encourage weight shift frequently; assist with reposition if unable to complete independently.  Float heels off bed. Avoid pressure on the Achilles tendon.  Keep skin free from extended contact with medical devices.  Use aids (e.g., slide  boards, mechanical lift) during transfer.  Recent Flowsheet Documentation  Taken 1/21/2022 0745 by Adela Levine LPN  Body Position:   position changed independently   position maintained  Skin Protection: adhesive use limited  Goal: Optimal Comfort and Wellbeing  Outcome: Ongoing, Progressing  Goal: Readiness for Transition of Care  Outcome: Ongoing, Progressing   Patient observed with eyes open in sitting position in chair. Rise and fall of chest observed. Dressings clean dry and intact. Hourly rounding completed this shift, no complaints or needs voiced.  Goal Outcome Evaluation:

## 2022-01-21 NOTE — CASE MANAGEMENT/SOCIAL WORK
Continued Stay Note  [facility]     Patient Name: Marjorie FLORENTINO  MRN: [id number]  Today's Date: 1/21/2022    Admit Date: 1/11/2022     Discharge Plan     Row Name 01/21/22 1058       Plan    Plan Comments Verified via phone that workmans comp form received by Charlene medical assistant to Dr. Meyers, and that completion is pending discharge.  Secure chat sent to Dr. Meyers regarding fax.  Informed marilyn Clinton at [facility] that I am waiting to receive completion of form and will be in touch with her as soon as receiving.    Row Name 01/21/22 0853       Plan    Plan Comments Received email with workmans comp form Mary Beth at [facility].  Called and spoke with Charlene - Dr. Meyers's medical assistant.  Faxed workmans comp form to Charlene through [employer] for Dr. Meyers to complete.                    Expected Discharge Date and Time     Expected Discharge Date Expected Discharge Time    Jan 21, 2022             Carlene Pink RN

## 2022-01-21 NOTE — CONSULTS
Picc Team Consult:  Picc ordered for TPN 1/18. Picc on hold per surgeon. Patient began eating, no need for TPN. Patient expected to discharge today.

## 2022-01-21 NOTE — CASE MANAGEMENT/SOCIAL WORK
Continued Stay Note  [facility]     Patient Name: Marjorie FLORENTINO  MRN: [id number]  Today's Date: 1/21/2022    Admit Date: 1/11/2022     Discharge Plan     Row Name 01/21/22 0851       Plan    Plan Comments Received email with karen Clinton at [facility].  Called and spoke with Charlene - Dr. Meyers's medical assistant.  Faxed workmans comp form to Charlene through [employer] for Dr. Meyers to complete.                    Expected Discharge Date and Time     Expected Discharge Date Expected Discharge Time    Jan 21, 2022             Carlene Pink RN

## 2022-01-25 ENCOUNTER — HOSPITAL ENCOUNTER (OUTPATIENT)
Facility: HOSPITAL | Age: 57
Discharge: HOME | End: 2022-01-26
Attending: HOSPITALIST | Admitting: INTERNAL MEDICINE

## 2022-01-25 ENCOUNTER — APPOINTMENT (OUTPATIENT)
Dept: GENERAL RADIOLOGY | Facility: HOSPITAL | Age: 57
End: 2022-01-25

## 2022-01-25 ENCOUNTER — TELEPHONE (OUTPATIENT)
Dept: NEUROSURGERY | Facility: CLINIC | Age: 57
End: 2022-01-25

## 2022-01-25 DIAGNOSIS — R63.30 FEEDING DIFFICULTIES: ICD-10-CM

## 2022-01-25 DIAGNOSIS — R13.10 DYSPHAGIA, UNSPECIFIED TYPE: Primary | ICD-10-CM

## 2022-01-25 PROBLEM — E66.3 OVERWEIGHT (BMI 25.0-29.9): Chronic | Status: ACTIVE | Noted: 2022-01-25

## 2022-01-25 LAB
ALBUMIN SERPL-MCNC: 4 G/DL (ref 3.5–5.2)
ALBUMIN/GLOB SERPL: 1.3 G/DL
ALP SERPL-CCNC: 67 U/L (ref 39–117)
ALT SERPL W P-5'-P-CCNC: 10 U/L (ref 1–33)
ANION GAP SERPL CALCULATED.3IONS-SCNC: 12 MMOL/L (ref 5–15)
AST SERPL-CCNC: 13 U/L (ref 1–32)
BASOPHILS # BLD AUTO: 0.1 10*3/MM3 (ref 0–0.2)
BASOPHILS NFR BLD AUTO: 0.6 % (ref 0–1.5)
BILIRUB SERPL-MCNC: 0.3 MG/DL (ref 0–1.2)
BUN SERPL-MCNC: 22 MG/DL (ref 6–20)
BUN/CREAT SERPL: 30.1 (ref 7–25)
CALCIUM SPEC-SCNC: 9.6 MG/DL (ref 8.6–10.5)
CHLORIDE SERPL-SCNC: 100 MMOL/L (ref 98–107)
CO2 SERPL-SCNC: 29 MMOL/L (ref 22–29)
CREAT SERPL-MCNC: 0.73 MG/DL (ref 0.57–1)
DEPRECATED RDW RBC AUTO: 44.2 FL (ref 37–54)
EOSINOPHIL # BLD AUTO: 0.2 10*3/MM3 (ref 0–0.4)
EOSINOPHIL NFR BLD AUTO: 1.7 % (ref 0.3–6.2)
ERYTHROCYTE [DISTWIDTH] IN BLOOD BY AUTOMATED COUNT: 13.5 % (ref 12.3–15.4)
GFR SERPL CREATININE-BSD FRML MDRD: 82 ML/MIN/1.73
GLOBULIN UR ELPH-MCNC: 3.1 GM/DL
GLUCOSE SERPL-MCNC: 105 MG/DL (ref 65–99)
HCT VFR BLD AUTO: 40.5 % (ref 34–46.6)
HGB BLD-MCNC: 13.8 G/DL (ref 12–15.9)
LYMPHOCYTES # BLD AUTO: 1.7 10*3/MM3 (ref 0.7–3.1)
LYMPHOCYTES NFR BLD AUTO: 18.4 % (ref 19.6–45.3)
MCH RBC QN AUTO: 32.1 PG (ref 26.6–33)
MCHC RBC AUTO-ENTMCNC: 33.9 G/DL (ref 31.5–35.7)
MCV RBC AUTO: 94.6 FL (ref 79–97)
MONOCYTES # BLD AUTO: 0.6 10*3/MM3 (ref 0.1–0.9)
MONOCYTES NFR BLD AUTO: 7.1 % (ref 5–12)
NEUTROPHILS NFR BLD AUTO: 6.5 10*3/MM3 (ref 1.7–7)
NEUTROPHILS NFR BLD AUTO: 72.2 % (ref 42.7–76)
NRBC BLD AUTO-RTO: 0.1 /100 WBC (ref 0–0.2)
PLATELET # BLD AUTO: 379 10*3/MM3 (ref 140–450)
PMV BLD AUTO: 7.5 FL (ref 6–12)
POTASSIUM SERPL-SCNC: 3.7 MMOL/L (ref 3.5–5.2)
PROCALCITONIN SERPL-MCNC: 0.07 NG/ML (ref 0–0.25)
PROT SERPL-MCNC: 7.1 G/DL (ref 6–8.5)
RBC # BLD AUTO: 4.28 10*6/MM3 (ref 3.77–5.28)
SARS-COV-2 RNA PNL SPEC NAA+PROBE: NOT DETECTED
SODIUM SERPL-SCNC: 141 MMOL/L (ref 136–145)
WBC NRBC COR # BLD: 9 10*3/MM3 (ref 3.4–10.8)

## 2022-01-25 PROCEDURE — 99284 EMERGENCY DEPT VISIT MOD MDM: CPT

## 2022-01-25 PROCEDURE — G0378 HOSPITAL OBSERVATION PER HR: HCPCS

## 2022-01-25 PROCEDURE — 96375 TX/PRO/DX INJ NEW DRUG ADDON: CPT

## 2022-01-25 PROCEDURE — 99219 PR INITIAL OBSERVATION CARE/DAY 50 MINUTES: CPT | Performed by: NURSE PRACTITIONER

## 2022-01-25 PROCEDURE — 80053 COMPREHEN METABOLIC PANEL: CPT | Performed by: NURSE PRACTITIONER

## 2022-01-25 PROCEDURE — 84145 PROCALCITONIN (PCT): CPT | Performed by: NURSE PRACTITIONER

## 2022-01-25 PROCEDURE — 96374 THER/PROPH/DIAG INJ IV PUSH: CPT

## 2022-01-25 PROCEDURE — 85025 COMPLETE CBC W/AUTO DIFF WBC: CPT | Performed by: NURSE PRACTITIONER

## 2022-01-25 PROCEDURE — U0003 INFECTIOUS AGENT DETECTION BY NUCLEIC ACID (DNA OR RNA); SEVERE ACUTE RESPIRATORY SYNDROME CORONAVIRUS 2 (SARS-COV-2) (CORONAVIRUS DISEASE [COVID-19]), AMPLIFIED PROBE TECHNIQUE, MAKING USE OF HIGH THROUGHPUT TECHNOLOGIES AS DESCRIBED BY CMS-2020-01-R: HCPCS | Performed by: NURSE PRACTITIONER

## 2022-01-25 PROCEDURE — 25010000002 MORPHINE SULFATE (PF) 2 MG/ML SOLUTION: Performed by: NURSE PRACTITIONER

## 2022-01-25 PROCEDURE — 25010000002 ONDANSETRON PER 1 MG: Performed by: NURSE PRACTITIONER

## 2022-01-25 PROCEDURE — 71046 X-RAY EXAM CHEST 2 VIEWS: CPT

## 2022-01-25 PROCEDURE — C9803 HOPD COVID-19 SPEC COLLECT: HCPCS

## 2022-01-25 RX ORDER — ONDANSETRON 4 MG/1
4 TABLET, FILM COATED ORAL EVERY 6 HOURS PRN
Status: DISCONTINUED | OUTPATIENT
Start: 2022-01-25 | End: 2022-01-26 | Stop reason: HOSPADM

## 2022-01-25 RX ORDER — DOCUSATE SODIUM 100 MG/1
100 CAPSULE, LIQUID FILLED ORAL 2 TIMES DAILY
COMMUNITY

## 2022-01-25 RX ORDER — ONDANSETRON 2 MG/ML
4 INJECTION INTRAMUSCULAR; INTRAVENOUS ONCE
Status: COMPLETED | OUTPATIENT
Start: 2022-01-25 | End: 2022-01-25

## 2022-01-25 RX ORDER — ONDANSETRON 2 MG/ML
4 INJECTION INTRAMUSCULAR; INTRAVENOUS EVERY 6 HOURS PRN
Status: DISCONTINUED | OUTPATIENT
Start: 2022-01-25 | End: 2022-01-26 | Stop reason: HOSPADM

## 2022-01-25 RX ORDER — ACETAMINOPHEN 325 MG/1
650 TABLET ORAL EVERY 4 HOURS PRN
Status: DISCONTINUED | OUTPATIENT
Start: 2022-01-25 | End: 2022-01-26 | Stop reason: HOSPADM

## 2022-01-25 RX ORDER — SODIUM CHLORIDE 0.9 % (FLUSH) 0.9 %
10 SYRINGE (ML) INJECTION EVERY 12 HOURS SCHEDULED
Status: DISCONTINUED | OUTPATIENT
Start: 2022-01-25 | End: 2022-01-26 | Stop reason: HOSPADM

## 2022-01-25 RX ORDER — MORPHINE SULFATE 2 MG/ML
2 INJECTION, SOLUTION INTRAMUSCULAR; INTRAVENOUS ONCE
Status: COMPLETED | OUTPATIENT
Start: 2022-01-25 | End: 2022-01-25

## 2022-01-25 RX ORDER — SODIUM CHLORIDE 0.9 % (FLUSH) 0.9 %
10 SYRINGE (ML) INJECTION AS NEEDED
Status: DISCONTINUED | OUTPATIENT
Start: 2022-01-25 | End: 2022-01-26 | Stop reason: HOSPADM

## 2022-01-25 RX ORDER — GUAIFENESIN 600 MG/1
600 TABLET, EXTENDED RELEASE ORAL 2 TIMES DAILY
COMMUNITY

## 2022-01-25 RX ORDER — BISACODYL 10 MG
10 SUPPOSITORY, RECTAL RECTAL DAILY PRN
COMMUNITY

## 2022-01-25 RX ORDER — ACETAMINOPHEN 325 MG/1
650 TABLET ORAL EVERY 4 HOURS PRN
COMMUNITY

## 2022-01-25 RX ORDER — ACETAMINOPHEN 160 MG/5ML
650 SOLUTION ORAL EVERY 4 HOURS PRN
Status: DISCONTINUED | OUTPATIENT
Start: 2022-01-25 | End: 2022-01-26 | Stop reason: HOSPADM

## 2022-01-25 RX ORDER — FAMOTIDINE 20 MG/1
20 TABLET, FILM COATED ORAL 2 TIMES DAILY
COMMUNITY
End: 2022-01-26 | Stop reason: HOSPADM

## 2022-01-25 RX ORDER — CHOLECALCIFEROL (VITAMIN D3) 125 MCG
5 CAPSULE ORAL NIGHTLY PRN
Status: DISCONTINUED | OUTPATIENT
Start: 2022-01-25 | End: 2022-01-26 | Stop reason: HOSPADM

## 2022-01-25 RX ORDER — ALUMINA, MAGNESIA, AND SIMETHICONE 2400; 2400; 240 MG/30ML; MG/30ML; MG/30ML
15 SUSPENSION ORAL EVERY 6 HOURS PRN
Status: DISCONTINUED | OUTPATIENT
Start: 2022-01-25 | End: 2022-01-26 | Stop reason: HOSPADM

## 2022-01-25 RX ORDER — NITROGLYCERIN 0.4 MG/1
0.4 TABLET SUBLINGUAL
Status: DISCONTINUED | OUTPATIENT
Start: 2022-01-25 | End: 2022-01-26 | Stop reason: HOSPADM

## 2022-01-25 RX ORDER — ACETAMINOPHEN 650 MG/1
650 SUPPOSITORY RECTAL EVERY 4 HOURS PRN
Status: DISCONTINUED | OUTPATIENT
Start: 2022-01-25 | End: 2022-01-26 | Stop reason: HOSPADM

## 2022-01-25 RX ORDER — MORPHINE SULFATE 2 MG/ML
2 INJECTION, SOLUTION INTRAMUSCULAR; INTRAVENOUS EVERY 4 HOURS PRN
Status: DISCONTINUED | OUTPATIENT
Start: 2022-01-25 | End: 2022-01-26 | Stop reason: HOSPADM

## 2022-01-25 RX ADMIN — ONDANSETRON 4 MG: 2 INJECTION INTRAMUSCULAR; INTRAVENOUS at 20:17

## 2022-01-25 RX ADMIN — Medication 10 ML: at 23:21

## 2022-01-25 RX ADMIN — SODIUM CHLORIDE, SODIUM LACTATE, POTASSIUM CHLORIDE, AND CALCIUM CHLORIDE 1000 ML: .6; .31; .03; .02 INJECTION, SOLUTION INTRAVENOUS at 20:25

## 2022-01-25 RX ADMIN — MORPHINE SULFATE 2 MG: 2 INJECTION, SOLUTION INTRAMUSCULAR; INTRAVENOUS at 20:17

## 2022-01-25 NOTE — TELEPHONE ENCOUNTER
Nurse from [facility] called to state that they were having the patient transported to the ER because facility staff is afraid that she may be aspirating on things that she is eating or drinking.

## 2022-01-26 ENCOUNTER — READMISSION MANAGEMENT (OUTPATIENT)
Dept: CALL CENTER | Facility: HOSPITAL | Age: 57
End: 2022-01-26

## 2022-01-26 ENCOUNTER — ANESTHESIA (OUTPATIENT)
Dept: GASTROENTEROLOGY | Facility: HOSPITAL | Age: 57
End: 2022-01-26

## 2022-01-26 ENCOUNTER — ANESTHESIA EVENT (OUTPATIENT)
Dept: GASTROENTEROLOGY | Facility: HOSPITAL | Age: 57
End: 2022-01-26

## 2022-01-26 VITALS
HEIGHT: 70 IN | SYSTOLIC BLOOD PRESSURE: 128 MMHG | BODY MASS INDEX: 28.2 KG/M2 | WEIGHT: 197 LBS | OXYGEN SATURATION: 94 % | HEART RATE: 95 BPM | DIASTOLIC BLOOD PRESSURE: 61 MMHG | TEMPERATURE: 98.7 F | RESPIRATION RATE: 20 BRPM

## 2022-01-26 VITALS — DIASTOLIC BLOOD PRESSURE: 62 MMHG | OXYGEN SATURATION: 100 % | SYSTOLIC BLOOD PRESSURE: 114 MMHG | HEART RATE: 96 BPM

## 2022-01-26 PROBLEM — R63.30 FEEDING DIFFICULTIES: Status: ACTIVE | Noted: 2022-01-25

## 2022-01-26 LAB
ANION GAP SERPL CALCULATED.3IONS-SCNC: 15 MMOL/L (ref 5–15)
BASOPHILS # BLD AUTO: 0.1 10*3/MM3 (ref 0–0.2)
BASOPHILS NFR BLD AUTO: 0.7 % (ref 0–1.5)
BUN SERPL-MCNC: 24 MG/DL (ref 6–20)
BUN/CREAT SERPL: 31.2 (ref 7–25)
CALCIUM SPEC-SCNC: 9.6 MG/DL (ref 8.6–10.5)
CHLORIDE SERPL-SCNC: 99 MMOL/L (ref 98–107)
CO2 SERPL-SCNC: 28 MMOL/L (ref 22–29)
CREAT SERPL-MCNC: 0.77 MG/DL (ref 0.57–1)
DEPRECATED RDW RBC AUTO: 42 FL (ref 37–54)
EOSINOPHIL # BLD AUTO: 0.2 10*3/MM3 (ref 0–0.4)
EOSINOPHIL NFR BLD AUTO: 1.9 % (ref 0.3–6.2)
ERYTHROCYTE [DISTWIDTH] IN BLOOD BY AUTOMATED COUNT: 12.9 % (ref 12.3–15.4)
GFR SERPL CREATININE-BSD FRML MDRD: 78 ML/MIN/1.73
GLUCOSE SERPL-MCNC: 102 MG/DL (ref 65–99)
HCT VFR BLD AUTO: 38.5 % (ref 34–46.6)
HGB BLD-MCNC: 13.1 G/DL (ref 12–15.9)
INR PPP: 1.07 (ref 0.93–1.1)
LYMPHOCYTES # BLD AUTO: 2 10*3/MM3 (ref 0.7–3.1)
LYMPHOCYTES NFR BLD AUTO: 22.7 % (ref 19.6–45.3)
MCH RBC QN AUTO: 32 PG (ref 26.6–33)
MCHC RBC AUTO-ENTMCNC: 34.1 G/DL (ref 31.5–35.7)
MCV RBC AUTO: 93.7 FL (ref 79–97)
MONOCYTES # BLD AUTO: 0.7 10*3/MM3 (ref 0.1–0.9)
MONOCYTES NFR BLD AUTO: 7.9 % (ref 5–12)
NEUTROPHILS NFR BLD AUTO: 5.8 10*3/MM3 (ref 1.7–7)
NEUTROPHILS NFR BLD AUTO: 66.8 % (ref 42.7–76)
NRBC BLD AUTO-RTO: 0 /100 WBC (ref 0–0.2)
PLATELET # BLD AUTO: 372 10*3/MM3 (ref 140–450)
PMV BLD AUTO: 7.7 FL (ref 6–12)
POTASSIUM SERPL-SCNC: 3.4 MMOL/L (ref 3.5–5.2)
PROTHROMBIN TIME: 11.8 SECONDS (ref 9.6–11.7)
RBC # BLD AUTO: 4.1 10*6/MM3 (ref 3.77–5.28)
SODIUM SERPL-SCNC: 142 MMOL/L (ref 136–145)
WBC NRBC COR # BLD: 8.7 10*3/MM3 (ref 3.4–10.8)

## 2022-01-26 PROCEDURE — 25010000002 LORAZEPAM PER 2 MG

## 2022-01-26 PROCEDURE — 99217 PR OBSERVATION CARE DISCHARGE MANAGEMENT: CPT | Performed by: INTERNAL MEDICINE

## 2022-01-26 PROCEDURE — 80048 BASIC METABOLIC PNL TOTAL CA: CPT | Performed by: NURSE PRACTITIONER

## 2022-01-26 PROCEDURE — G0378 HOSPITAL OBSERVATION PER HR: HCPCS

## 2022-01-26 PROCEDURE — 88305 TISSUE EXAM BY PATHOLOGIST: CPT | Performed by: INTERNAL MEDICINE

## 2022-01-26 PROCEDURE — 85610 PROTHROMBIN TIME: CPT | Performed by: NURSE PRACTITIONER

## 2022-01-26 PROCEDURE — 85025 COMPLETE CBC W/AUTO DIFF WBC: CPT | Performed by: NURSE PRACTITIONER

## 2022-01-26 PROCEDURE — 36415 COLL VENOUS BLD VENIPUNCTURE: CPT | Performed by: NURSE PRACTITIONER

## 2022-01-26 PROCEDURE — 25010000002 PROPOFOL 10 MG/ML EMULSION

## 2022-01-26 RX ORDER — SODIUM CHLORIDE 0.9 % (FLUSH) 0.9 %
3-10 SYRINGE (ML) INJECTION AS NEEDED
Status: DISCONTINUED | OUTPATIENT
Start: 2022-01-26 | End: 2022-01-26

## 2022-01-26 RX ORDER — PROPOFOL 10 MG/ML
VIAL (ML) INTRAVENOUS AS NEEDED
Status: DISCONTINUED | OUTPATIENT
Start: 2022-01-26 | End: 2022-01-26 | Stop reason: SURG

## 2022-01-26 RX ORDER — PANTOPRAZOLE SODIUM 40 MG/1
40 TABLET, DELAYED RELEASE ORAL DAILY
Status: ON HOLD
Start: 2022-01-26 | End: 2022-03-22

## 2022-01-26 RX ORDER — ONDANSETRON 4 MG/1
4 TABLET, FILM COATED ORAL EVERY 6 HOURS PRN
Status: DISCONTINUED | OUTPATIENT
Start: 2022-01-26 | End: 2022-01-26 | Stop reason: SDUPTHER

## 2022-01-26 RX ORDER — FLUMAZENIL 0.1 MG/ML
0.2 INJECTION INTRAVENOUS AS NEEDED
Status: DISCONTINUED | OUTPATIENT
Start: 2022-01-26 | End: 2022-01-26

## 2022-01-26 RX ORDER — DIPHENHYDRAMINE HYDROCHLORIDE 50 MG/ML
12.5 INJECTION INTRAMUSCULAR; INTRAVENOUS
Status: DISCONTINUED | OUTPATIENT
Start: 2022-01-26 | End: 2022-01-26

## 2022-01-26 RX ORDER — NALOXONE HCL 0.4 MG/ML
0.4 VIAL (ML) INJECTION AS NEEDED
Status: DISCONTINUED | OUTPATIENT
Start: 2022-01-26 | End: 2022-01-26

## 2022-01-26 RX ORDER — ONDANSETRON 2 MG/ML
4 INJECTION INTRAMUSCULAR; INTRAVENOUS EVERY 6 HOURS PRN
Status: DISCONTINUED | OUTPATIENT
Start: 2022-01-26 | End: 2022-01-26 | Stop reason: SDUPTHER

## 2022-01-26 RX ORDER — LIDOCAINE HYDROCHLORIDE 10 MG/ML
INJECTION, SOLUTION EPIDURAL; INFILTRATION; INTRACAUDAL; PERINEURAL AS NEEDED
Status: DISCONTINUED | OUTPATIENT
Start: 2022-01-26 | End: 2022-01-26 | Stop reason: SURG

## 2022-01-26 RX ORDER — ACETAMINOPHEN 650 MG/1
650 SUPPOSITORY RECTAL ONCE AS NEEDED
Status: DISCONTINUED | OUTPATIENT
Start: 2022-01-26 | End: 2022-01-26

## 2022-01-26 RX ORDER — DEXAMETHASONE SODIUM PHOSPHATE 4 MG/ML
8 INJECTION, SOLUTION INTRA-ARTICULAR; INTRALESIONAL; INTRAMUSCULAR; INTRAVENOUS; SOFT TISSUE ONCE AS NEEDED
Status: DISCONTINUED | OUTPATIENT
Start: 2022-01-26 | End: 2022-01-26

## 2022-01-26 RX ORDER — SODIUM CHLORIDE 9 MG/ML
INJECTION, SOLUTION INTRAVENOUS CONTINUOUS PRN
Status: DISCONTINUED | OUTPATIENT
Start: 2022-01-26 | End: 2022-01-26 | Stop reason: SURG

## 2022-01-26 RX ORDER — ONDANSETRON 2 MG/ML
4 INJECTION INTRAMUSCULAR; INTRAVENOUS ONCE AS NEEDED
Status: DISCONTINUED | OUTPATIENT
Start: 2022-01-26 | End: 2022-01-26

## 2022-01-26 RX ORDER — ACETAMINOPHEN 325 MG/1
650 TABLET ORAL ONCE AS NEEDED
Status: DISCONTINUED | OUTPATIENT
Start: 2022-01-26 | End: 2022-01-26

## 2022-01-26 RX ORDER — SODIUM CHLORIDE 9 MG/ML
100 INJECTION, SOLUTION INTRAVENOUS CONTINUOUS
Status: DISCONTINUED | OUTPATIENT
Start: 2022-01-26 | End: 2022-01-26 | Stop reason: HOSPADM

## 2022-01-26 RX ORDER — LORAZEPAM 2 MG/ML
1 INJECTION INTRAMUSCULAR EVERY 4 HOURS PRN
Status: DISCONTINUED | OUTPATIENT
Start: 2022-01-26 | End: 2022-01-26

## 2022-01-26 RX ORDER — PROMETHAZINE HYDROCHLORIDE 25 MG/1
25 SUPPOSITORY RECTAL ONCE AS NEEDED
Status: DISCONTINUED | OUTPATIENT
Start: 2022-01-26 | End: 2022-01-26

## 2022-01-26 RX ORDER — PROMETHAZINE HYDROCHLORIDE 25 MG/1
25 TABLET ORAL ONCE AS NEEDED
Status: DISCONTINUED | OUTPATIENT
Start: 2022-01-26 | End: 2022-01-26

## 2022-01-26 RX ORDER — SODIUM CHLORIDE 0.9 % (FLUSH) 0.9 %
3 SYRINGE (ML) INJECTION EVERY 12 HOURS SCHEDULED
Status: DISCONTINUED | OUTPATIENT
Start: 2022-01-26 | End: 2022-01-26

## 2022-01-26 RX ORDER — LORAZEPAM 2 MG/ML
INJECTION INTRAMUSCULAR
Status: COMPLETED
Start: 2022-01-26 | End: 2022-01-26

## 2022-01-26 RX ADMIN — Medication 10 ML: at 09:01

## 2022-01-26 RX ADMIN — LORAZEPAM 1 MG: 2 INJECTION INTRAMUSCULAR; INTRAVENOUS at 11:43

## 2022-01-26 RX ADMIN — LORAZEPAM 1 MG: 2 INJECTION INTRAMUSCULAR at 11:43

## 2022-01-26 RX ADMIN — SODIUM CHLORIDE: 0.9 INJECTION, SOLUTION INTRAVENOUS at 13:18

## 2022-01-26 RX ADMIN — CEFAZOLIN SODIUM 2 G: 1 INJECTION, POWDER, FOR SOLUTION INTRAMUSCULAR; INTRAVENOUS at 13:18

## 2022-01-26 RX ADMIN — PROPOFOL 460 MG: 10 INJECTION, EMULSION INTRAVENOUS at 13:19

## 2022-01-26 RX ADMIN — LIDOCAINE HYDROCHLORIDE 100 MG: 10 INJECTION, SOLUTION EPIDURAL; INFILTRATION; INTRACAUDAL; PERINEURAL at 13:19

## 2022-01-26 NOTE — ANESTHESIA PREPROCEDURE EVALUATION
Anesthesia Evaluation     Patient summary reviewed and Nursing notes reviewed   no history of anesthetic complications:  NPO Solid Status: > 8 hours  NPO Liquid Status: > 8 hours           Airway   Dental      Pulmonary    Cardiovascular     ECG reviewed    (+) hypertension,       Neuro/Psych  (+) psychiatric history Depression,     GI/Hepatic/Renal/Endo    (+)  GERD,      Musculoskeletal     (+) back pain, neck pain,   Abdominal    Substance History      OB/GYN          Other        ROS/Med Hx Other: Dysphagia, hypokalemia, cervical myelopathy    PSH   SECTION ANTERIOR CERVICAL DISCECTOMY W/ FUSION                   Anesthesia Plan    ASA 3     MAC   (Patient identified; pre-operative vital signs, all relevant labs/studies, complete medical/surgical/anesthetic history, full medication list, full allergy list, and NPO status obtained/reviewed; physical assessment performed; anesthetic options, side effects, potential complications, risks, and benefits discussed; questions answered; written anesthesia consent obtained; patient cleared for procedure; anesthesia machine and equipment checked and functioning)    Anesthetic plan, all risks, benefits, and alternatives have been provided, discussed and informed consent has been obtained with: patient.    Plan discussed with CRNA and CAA.        CODE STATUS:    Code Status (Patient has no pulse and is not breathing): CPR (Attempt to Resuscitate)  Medical Interventions (Patient has pulse or is breathing): Full Support

## 2022-01-26 NOTE — OUTREACH NOTE
Prep Survey      Responses   [facility] facility patient discharged from? [facility]   Is LACE score < 7 ? Yes   Emergency Room discharge w/ pulse ox? No   Eligibility Not Eligible   What are the reasons patient is not eligible? [facility]  [PAPA]   Does the patient have one of the following disease processes/diagnoses(primary or secondary)? General Surgery   Prep survey completed? Yes          Roxana Rodriguez RN

## 2022-01-26 NOTE — ANESTHESIA POSTPROCEDURE EVALUATION
Patient: Marjorie FLORENTINO    Procedure Summary     Date: 01/26/22 Room / Location: [facility] ENDOSCOPY 1 / [facility] ENDOSCOPY    Anesthesia Start: 1317 Anesthesia Stop: 1347    Procedure: ESOPHAGOGASTRODUODENOSCOPY WITH PERCUTANEOUS ENDOSCOPIC GASTROSTOMY TUBE INSERTION and biopsy x 1 (N/A ) Diagnosis:       Dysphagia, unspecified type      Feeding difficulties      (Dysphagia, unspecified type [R13.10])      (Feeding difficulties [R63.30])    Surgeons: Luis Antonio Ochoa MD Provider: Aston Chance MD    Anesthesia Type: MAC ASA Status: 3          Anesthesia Type: MAC    Vitals  Vitals Value Taken Time   /61 01/26/22 1409   Temp     Pulse 96 01/26/22 1410   Resp 20 01/26/22 1409   SpO2 98 % 01/26/22 1410   Vitals shown include unvalidated device data.        Post Anesthesia Care and Evaluation    Patient location during evaluation: PACU  Patient participation: complete - patient participated  Level of consciousness: awake  Pain scale: See nurse's notes for pain score.  Pain management: adequate  Airway patency: patent  Anesthetic complications: No anesthetic complications  PONV Status: none  Cardiovascular status: acceptable  Respiratory status: acceptable  Hydration status: acceptable    Comments: Patient seen and examined postoperatively; vital signs stable; SpO2 greater than or equal to 90%; cardiopulmonary status stable; nausea/vomiting adequately controlled; pain adequately controlled; no apparent anesthesia complications; patient discharged from anesthesia care when discharge criteria were met

## 2022-01-28 LAB
LAB AP CASE REPORT: NORMAL
LAB AP CLINICAL INFORMATION: NORMAL
PATH REPORT.FINAL DX SPEC: NORMAL
PATH REPORT.GROSS SPEC: NORMAL

## 2022-02-01 ENCOUNTER — TELEPHONE (OUTPATIENT)
Dept: NEUROSURGERY | Facility: CLINIC | Age: 57
End: 2022-02-01

## 2022-02-01 ENCOUNTER — OFFICE VISIT (OUTPATIENT)
Dept: NEUROSURGERY | Facility: CLINIC | Age: 57
End: 2022-02-01

## 2022-02-01 VITALS
HEART RATE: 93 BPM | RESPIRATION RATE: 18 BRPM | DIASTOLIC BLOOD PRESSURE: 79 MMHG | OXYGEN SATURATION: 97 % | WEIGHT: 197 LBS | HEIGHT: 70 IN | SYSTOLIC BLOOD PRESSURE: 123 MMHG | TEMPERATURE: 97.8 F | BODY MASS INDEX: 28.2 KG/M2

## 2022-02-01 DIAGNOSIS — M50.00 INTERVERTEBRAL CERVICAL DISC DISORDER WITH MYELOPATHY, CERVICAL REGION: Primary | ICD-10-CM

## 2022-02-01 PROCEDURE — 99024 POSTOP FOLLOW-UP VISIT: CPT | Performed by: NEUROLOGICAL SURGERY

## 2022-02-01 NOTE — TELEPHONE ENCOUNTER
Caller: CHAVA CASTANEDA  Relationship:   Best call back number: [phone]    What was the call regarding:     THEY ARE NEEDING THE OFFICE NOTES FROM PATIENT'S POST OP TODAY. DR. GIBBS IS WANTING TO REVIEW THESE NOTES BEFORE HE MAKES A DECISION. HE HAS A MEETING SOON AND WOULD LIKE TO REVIEW THESE NOTES BEFORE THEN.       PLEASE SEND THE POST OP OFFICE NOTES FROM TODAY 02/01/22 [phone] ATTN. DR. GIBBS.     SENDING HIGH PRIORITY AS DR. GIBBS WOULD LIKE TO REVIEW THESE NOTES ASAP.

## 2022-03-11 DIAGNOSIS — M50.00 INTERVERTEBRAL CERVICAL DISC DISORDER WITH MYELOPATHY, CERVICAL REGION: Primary | ICD-10-CM

## 2022-03-15 ENCOUNTER — TELEPHONE (OUTPATIENT)
Dept: NEUROSURGERY | Facility: CLINIC | Age: 57
End: 2022-03-15

## 2022-03-15 DIAGNOSIS — R47.81 SLURRED SPEECH: Primary | ICD-10-CM

## 2022-03-15 NOTE — TELEPHONE ENCOUNTER
RN from [facility]- reports bilateral upper and lower extremity weakness and bilateral hand contracture and staggered speech. He denies cognitive delay or aphagia. Pt is back to regular diet.    He will send care notes and PT evaluation.

## 2022-03-15 NOTE — TELEPHONE ENCOUNTER
Sandy La called and wanted to report bilateral/generalized weakness. She denies unilateral weakness or any other stroke symptoms she just wants to see about getting her images moved up.     I let her know that she is always welcome to call scheduling at [phone] because they frequently have cancellations. I also stated that I was happy to do this myself however due to transpiration issues it would be easier for the patient if Sandy did it.     I also advised Sandy that if there was any signs/symptoms of a stroke and/or red flag symptoms that the patient should immediately be transported to the ER. Sandy stated that she understands and will do so.     Sandy also let me know that the patient is receiving home health care via [facility] so I will contact them for progress notes.

## 2022-03-17 ENCOUNTER — HOSPITAL ENCOUNTER (INPATIENT)
Facility: HOSPITAL | Age: 57
LOS: 7 days | Discharge: NURSING, REHAB OR LONG-TERM CARE | End: 2022-03-29
Attending: EMERGENCY MEDICINE | Admitting: STUDENT IN AN ORGANIZED HEALTH CARE EDUCATION/TRAINING PROGRAM

## 2022-03-17 ENCOUNTER — APPOINTMENT (OUTPATIENT)
Dept: MRI IMAGING | Facility: HOSPITAL | Age: 57
End: 2022-03-17

## 2022-03-17 ENCOUNTER — APPOINTMENT (OUTPATIENT)
Dept: GENERAL RADIOLOGY | Facility: HOSPITAL | Age: 57
End: 2022-03-17

## 2022-03-17 DIAGNOSIS — R20.2 PARESTHESIAS: Primary | ICD-10-CM

## 2022-03-17 DIAGNOSIS — R29.898 WEAKNESS OF BOTH HANDS: ICD-10-CM

## 2022-03-17 LAB
ANION GAP SERPL CALCULATED.3IONS-SCNC: 11 MMOL/L (ref 5–15)
ANION GAP SERPL CALCULATED.3IONS-SCNC: 12 MMOL/L (ref 5–15)
ANION GAP SERPL CALCULATED.3IONS-SCNC: 16 MMOL/L (ref 10–20)
BASOPHILS # BLD AUTO: 0 10*3/MM3 (ref 0–0.2)
BASOPHILS # BLD AUTO: 0.1 10*3/MM3 (ref 0–0.2)
BASOPHILS NFR BLD AUTO: 0.5 % (ref 0–1.5)
BASOPHILS NFR BLD AUTO: 0.6 % (ref 0–1.5)
BUN BLDA-MCNC: 12 MG/DL (ref 8–26)
BUN SERPL-MCNC: 10 MG/DL (ref 6–20)
BUN SERPL-MCNC: 11 MG/DL (ref 6–20)
BUN/CREAT SERPL: 16.9 (ref 7–25)
BUN/CREAT SERPL: 20.8 (ref 7–25)
CA-I BLDA-SCNC: 1.19 MMOL/L (ref 1.12–1.32)
CALCIUM SPEC-SCNC: 8.6 MG/DL (ref 8.6–10.5)
CALCIUM SPEC-SCNC: 8.7 MG/DL (ref 8.6–10.5)
CHLORIDE BLDA-SCNC: 104 MMOL/L (ref 98–109)
CHLORIDE SERPL-SCNC: 103 MMOL/L (ref 98–107)
CHLORIDE SERPL-SCNC: 104 MMOL/L (ref 98–107)
CO2 BLDA-SCNC: 25 MMOL/L (ref 24–29)
CO2 SERPL-SCNC: 24 MMOL/L (ref 22–29)
CO2 SERPL-SCNC: 24 MMOL/L (ref 22–29)
CREAT BLDA-MCNC: 0.5 MG/DL (ref 0.6–1.3)
CREAT SERPL-MCNC: 0.53 MG/DL (ref 0.57–1)
CREAT SERPL-MCNC: 0.59 MG/DL (ref 0.57–1)
DEPRECATED RDW RBC AUTO: 43.8 FL (ref 37–54)
DEPRECATED RDW RBC AUTO: 45.5 FL (ref 37–54)
EGFRCR SERPLBLD CKD-EPI 2021: 105.9 ML/MIN/1.73
EGFRCR SERPLBLD CKD-EPI 2021: 108.7 ML/MIN/1.73
EGFRCR SERPLBLD CKD-EPI 2021: 110.2 ML/MIN/1.73
EOSINOPHIL # BLD AUTO: 0 10*3/MM3 (ref 0–0.4)
EOSINOPHIL # BLD AUTO: 0.1 10*3/MM3 (ref 0–0.4)
EOSINOPHIL NFR BLD AUTO: 0.4 % (ref 0.3–6.2)
EOSINOPHIL NFR BLD AUTO: 0.7 % (ref 0.3–6.2)
ERYTHROCYTE [DISTWIDTH] IN BLOOD BY AUTOMATED COUNT: 13.5 % (ref 12.3–15.4)
ERYTHROCYTE [DISTWIDTH] IN BLOOD BY AUTOMATED COUNT: 13.9 % (ref 12.3–15.4)
GLUCOSE BLDC GLUCOMTR-MCNC: 101 MG/DL (ref 70–105)
GLUCOSE SERPL-MCNC: 120 MG/DL (ref 65–99)
GLUCOSE SERPL-MCNC: 92 MG/DL (ref 65–99)
HCT VFR BLD AUTO: 33 % (ref 34–46.6)
HCT VFR BLD AUTO: 34.3 % (ref 34–46.6)
HCT VFR BLDA CALC: 35 % (ref 38–51)
HGB BLD-MCNC: 11.1 G/DL (ref 12–15.9)
HGB BLD-MCNC: 11.4 G/DL (ref 12–15.9)
HGB BLDA-MCNC: 11.9 G/DL (ref 12–17)
LYMPHOCYTES # BLD AUTO: 0.9 10*3/MM3 (ref 0.7–3.1)
LYMPHOCYTES # BLD AUTO: 1.4 10*3/MM3 (ref 0.7–3.1)
LYMPHOCYTES NFR BLD AUTO: 10.4 % (ref 19.6–45.3)
LYMPHOCYTES NFR BLD AUTO: 17.7 % (ref 19.6–45.3)
MCH RBC QN AUTO: 30.9 PG (ref 26.6–33)
MCH RBC QN AUTO: 31.5 PG (ref 26.6–33)
MCHC RBC AUTO-ENTMCNC: 33.2 G/DL (ref 31.5–35.7)
MCHC RBC AUTO-ENTMCNC: 33.8 G/DL (ref 31.5–35.7)
MCV RBC AUTO: 93.1 FL (ref 79–97)
MCV RBC AUTO: 93.2 FL (ref 79–97)
MONOCYTES # BLD AUTO: 0.5 10*3/MM3 (ref 0.1–0.9)
MONOCYTES # BLD AUTO: 0.6 10*3/MM3 (ref 0.1–0.9)
MONOCYTES NFR BLD AUTO: 6.3 % (ref 5–12)
MONOCYTES NFR BLD AUTO: 6.5 % (ref 5–12)
NEUTROPHILS NFR BLD AUTO: 5.9 10*3/MM3 (ref 1.7–7)
NEUTROPHILS NFR BLD AUTO: 7.5 10*3/MM3 (ref 1.7–7)
NEUTROPHILS NFR BLD AUTO: 74.5 % (ref 42.7–76)
NEUTROPHILS NFR BLD AUTO: 82.4 % (ref 42.7–76)
NRBC BLD AUTO-RTO: 0 /100 WBC (ref 0–0.2)
NRBC BLD AUTO-RTO: 0.1 /100 WBC (ref 0–0.2)
PLATELET # BLD AUTO: 356 10*3/MM3 (ref 140–450)
PLATELET # BLD AUTO: 359 10*3/MM3 (ref 140–450)
PMV BLD AUTO: 7.3 FL (ref 6–12)
PMV BLD AUTO: 7.4 FL (ref 6–12)
POTASSIUM BLDA-SCNC: 3.7 MMOL/L (ref 3.5–4.9)
POTASSIUM SERPL-SCNC: 3.6 MMOL/L (ref 3.5–5.2)
POTASSIUM SERPL-SCNC: 3.7 MMOL/L (ref 3.5–5.2)
RBC # BLD AUTO: 3.54 10*6/MM3 (ref 3.77–5.28)
RBC # BLD AUTO: 3.68 10*6/MM3 (ref 3.77–5.28)
SARS-COV-2 RNA PNL SPEC NAA+PROBE: NOT DETECTED
SODIUM BLD-SCNC: 141 MMOL/L (ref 138–146)
SODIUM SERPL-SCNC: 139 MMOL/L (ref 136–145)
SODIUM SERPL-SCNC: 139 MMOL/L (ref 136–145)
WBC NRBC COR # BLD: 7.9 10*3/MM3 (ref 3.4–10.8)
WBC NRBC COR # BLD: 9.1 10*3/MM3 (ref 3.4–10.8)

## 2022-03-17 PROCEDURE — 72052 X-RAY EXAM NECK SPINE 6/>VWS: CPT

## 2022-03-17 PROCEDURE — 70551 MRI BRAIN STEM W/O DYE: CPT

## 2022-03-17 PROCEDURE — 99243 OFF/OP CNSLTJ NEW/EST LOW 30: CPT

## 2022-03-17 PROCEDURE — 36415 COLL VENOUS BLD VENIPUNCTURE: CPT | Performed by: HOSPITALIST

## 2022-03-17 PROCEDURE — G0378 HOSPITAL OBSERVATION PER HR: HCPCS

## 2022-03-17 PROCEDURE — 99223 1ST HOSP IP/OBS HIGH 75: CPT | Performed by: HOSPITALIST

## 2022-03-17 PROCEDURE — 80047 BASIC METABLC PNL IONIZED CA: CPT

## 2022-03-17 PROCEDURE — 85014 HEMATOCRIT: CPT

## 2022-03-17 PROCEDURE — 25010000002 ENOXAPARIN PER 10 MG: Performed by: HOSPITALIST

## 2022-03-17 PROCEDURE — 99284 EMERGENCY DEPT VISIT MOD MDM: CPT

## 2022-03-17 PROCEDURE — 85025 COMPLETE CBC W/AUTO DIFF WBC: CPT | Performed by: EMERGENCY MEDICINE

## 2022-03-17 PROCEDURE — 87635 SARS-COV-2 COVID-19 AMP PRB: CPT | Performed by: HOSPITALIST

## 2022-03-17 PROCEDURE — 25010000002 GADOTERIDOL PER 1 ML: Performed by: EMERGENCY MEDICINE

## 2022-03-17 PROCEDURE — 85025 COMPLETE CBC W/AUTO DIFF WBC: CPT | Performed by: HOSPITALIST

## 2022-03-17 PROCEDURE — A9579 GAD-BASE MR CONTRAST NOS,1ML: HCPCS | Performed by: EMERGENCY MEDICINE

## 2022-03-17 PROCEDURE — 72156 MRI NECK SPINE W/O & W/DYE: CPT

## 2022-03-17 PROCEDURE — 80048 BASIC METABOLIC PNL TOTAL CA: CPT | Performed by: EMERGENCY MEDICINE

## 2022-03-17 PROCEDURE — 80048 BASIC METABOLIC PNL TOTAL CA: CPT | Performed by: HOSPITALIST

## 2022-03-17 RX ORDER — ASPIRIN 325 MG
325 TABLET, DELAYED RELEASE (ENTERIC COATED) ORAL DAILY
Status: DISCONTINUED | OUTPATIENT
Start: 2022-03-17 | End: 2022-03-29 | Stop reason: HOSPADM

## 2022-03-17 RX ORDER — LIDOCAINE 50 MG/G
1 PATCH TOPICAL
Status: DISCONTINUED | OUTPATIENT
Start: 2022-03-17 | End: 2022-03-22

## 2022-03-17 RX ORDER — BISACODYL 10 MG
10 SUPPOSITORY, RECTAL RECTAL DAILY PRN
Status: DISCONTINUED | OUTPATIENT
Start: 2022-03-17 | End: 2022-03-29 | Stop reason: HOSPADM

## 2022-03-17 RX ORDER — CYCLOBENZAPRINE HCL 10 MG
10 TABLET ORAL 3 TIMES DAILY PRN
Status: DISCONTINUED | OUTPATIENT
Start: 2022-03-17 | End: 2022-03-29 | Stop reason: HOSPADM

## 2022-03-17 RX ORDER — DOCUSATE SODIUM 100 MG/1
100 CAPSULE, LIQUID FILLED ORAL 2 TIMES DAILY
Status: DISCONTINUED | OUTPATIENT
Start: 2022-03-17 | End: 2022-03-29 | Stop reason: HOSPADM

## 2022-03-17 RX ORDER — ACETAMINOPHEN 325 MG/1
650 TABLET ORAL EVERY 4 HOURS PRN
Status: DISCONTINUED | OUTPATIENT
Start: 2022-03-17 | End: 2022-03-29 | Stop reason: HOSPADM

## 2022-03-17 RX ORDER — LOSARTAN POTASSIUM 25 MG/1
25 TABLET ORAL DAILY
Status: DISCONTINUED | OUTPATIENT
Start: 2022-03-17 | End: 2022-03-29 | Stop reason: HOSPADM

## 2022-03-17 RX ORDER — CHOLECALCIFEROL (VITAMIN D3) 125 MCG
5 CAPSULE ORAL NIGHTLY PRN
Status: COMPLETED | OUTPATIENT
Start: 2022-03-17 | End: 2022-03-17

## 2022-03-17 RX ORDER — SODIUM CHLORIDE 0.9 % (FLUSH) 0.9 %
10 SYRINGE (ML) INJECTION AS NEEDED
Status: DISCONTINUED | OUTPATIENT
Start: 2022-03-17 | End: 2022-03-29 | Stop reason: HOSPADM

## 2022-03-17 RX ORDER — SODIUM CHLORIDE 0.9 % (FLUSH) 0.9 %
10 SYRINGE (ML) INJECTION EVERY 12 HOURS SCHEDULED
Status: DISCONTINUED | OUTPATIENT
Start: 2022-03-17 | End: 2022-03-29 | Stop reason: HOSPADM

## 2022-03-17 RX ORDER — HYDROCHLOROTHIAZIDE 25 MG/1
25 TABLET ORAL DAILY
Status: DISCONTINUED | OUTPATIENT
Start: 2022-03-17 | End: 2022-03-29 | Stop reason: HOSPADM

## 2022-03-17 RX ORDER — ATORVASTATIN CALCIUM 40 MG/1
40 TABLET, FILM COATED ORAL NIGHTLY
Status: DISCONTINUED | OUTPATIENT
Start: 2022-03-17 | End: 2022-03-29 | Stop reason: HOSPADM

## 2022-03-17 RX ORDER — PANTOPRAZOLE SODIUM 40 MG/1
40 TABLET, DELAYED RELEASE ORAL DAILY
Status: DISCONTINUED | OUTPATIENT
Start: 2022-03-17 | End: 2022-03-22

## 2022-03-17 RX ADMIN — GADOTERIDOL 20 ML: 279.3 INJECTION, SOLUTION INTRAVENOUS at 12:00

## 2022-03-17 RX ADMIN — Medication 5 MG: at 23:48

## 2022-03-17 RX ADMIN — ASPIRIN 325 MG: 325 TABLET, COATED ORAL at 17:49

## 2022-03-17 RX ADMIN — SERTRALINE 25 MG: 50 TABLET, FILM COATED ORAL at 20:38

## 2022-03-17 RX ADMIN — PANTOPRAZOLE SODIUM 40 MG: 40 TABLET, DELAYED RELEASE ORAL at 17:53

## 2022-03-17 RX ADMIN — MAGNESIUM GLUCONATE 500 MG ORAL TABLET 400 MG: 500 TABLET ORAL at 17:54

## 2022-03-17 RX ADMIN — LOSARTAN POTASSIUM 25 MG: 25 TABLET, FILM COATED ORAL at 17:53

## 2022-03-17 RX ADMIN — ENOXAPARIN SODIUM 40 MG: 40 INJECTION SUBCUTANEOUS at 17:46

## 2022-03-17 RX ADMIN — Medication 10 ML: at 20:38

## 2022-03-17 RX ADMIN — CYCLOBENZAPRINE 10 MG: 10 TABLET, FILM COATED ORAL at 17:54

## 2022-03-18 LAB
ANION GAP SERPL CALCULATED.3IONS-SCNC: 10 MMOL/L (ref 5–15)
BASOPHILS # BLD AUTO: 0 10*3/MM3 (ref 0–0.2)
BASOPHILS NFR BLD AUTO: 0.7 % (ref 0–1.5)
BUN SERPL-MCNC: 12 MG/DL (ref 6–20)
BUN/CREAT SERPL: 19.7 (ref 7–25)
CALCIUM SPEC-SCNC: 8.5 MG/DL (ref 8.6–10.5)
CHLORIDE SERPL-SCNC: 105 MMOL/L (ref 98–107)
CO2 SERPL-SCNC: 25 MMOL/L (ref 22–29)
CREAT SERPL-MCNC: 0.61 MG/DL (ref 0.57–1)
DEPRECATED RDW RBC AUTO: 43.8 FL (ref 37–54)
EGFRCR SERPLBLD CKD-EPI 2021: 105.1 ML/MIN/1.73
EOSINOPHIL # BLD AUTO: 0.1 10*3/MM3 (ref 0–0.4)
EOSINOPHIL NFR BLD AUTO: 2 % (ref 0.3–6.2)
ERYTHROCYTE [DISTWIDTH] IN BLOOD BY AUTOMATED COUNT: 13.5 % (ref 12.3–15.4)
GLUCOSE SERPL-MCNC: 92 MG/DL (ref 65–99)
HCT VFR BLD AUTO: 32.4 % (ref 34–46.6)
HGB BLD-MCNC: 11 G/DL (ref 12–15.9)
LYMPHOCYTES # BLD AUTO: 1.5 10*3/MM3 (ref 0.7–3.1)
LYMPHOCYTES NFR BLD AUTO: 22.7 % (ref 19.6–45.3)
MCH RBC QN AUTO: 31.7 PG (ref 26.6–33)
MCHC RBC AUTO-ENTMCNC: 33.9 G/DL (ref 31.5–35.7)
MCV RBC AUTO: 93.4 FL (ref 79–97)
MONOCYTES # BLD AUTO: 0.6 10*3/MM3 (ref 0.1–0.9)
MONOCYTES NFR BLD AUTO: 9 % (ref 5–12)
NEUTROPHILS NFR BLD AUTO: 4.3 10*3/MM3 (ref 1.7–7)
NEUTROPHILS NFR BLD AUTO: 65.6 % (ref 42.7–76)
NRBC BLD AUTO-RTO: 0.1 /100 WBC (ref 0–0.2)
PLATELET # BLD AUTO: 335 10*3/MM3 (ref 140–450)
PMV BLD AUTO: 7.9 FL (ref 6–12)
POTASSIUM SERPL-SCNC: 3.7 MMOL/L (ref 3.5–5.2)
RBC # BLD AUTO: 3.47 10*6/MM3 (ref 3.77–5.28)
SODIUM SERPL-SCNC: 140 MMOL/L (ref 136–145)
WBC NRBC COR # BLD: 6.6 10*3/MM3 (ref 3.4–10.8)

## 2022-03-18 PROCEDURE — 99213 OFFICE O/P EST LOW 20 MIN: CPT

## 2022-03-18 PROCEDURE — 99222 1ST HOSP IP/OBS MODERATE 55: CPT | Performed by: PSYCHIATRY & NEUROLOGY

## 2022-03-18 PROCEDURE — 99232 SBSQ HOSP IP/OBS MODERATE 35: CPT | Performed by: HOSPITALIST

## 2022-03-18 PROCEDURE — 92610 EVALUATE SWALLOWING FUNCTION: CPT

## 2022-03-18 PROCEDURE — G0378 HOSPITAL OBSERVATION PER HR: HCPCS

## 2022-03-18 PROCEDURE — 80048 BASIC METABOLIC PNL TOTAL CA: CPT | Performed by: HOSPITALIST

## 2022-03-18 PROCEDURE — 97162 PT EVAL MOD COMPLEX 30 MIN: CPT

## 2022-03-18 PROCEDURE — 25010000002 ENOXAPARIN PER 10 MG: Performed by: HOSPITALIST

## 2022-03-18 PROCEDURE — 97110 THERAPEUTIC EXERCISES: CPT

## 2022-03-18 PROCEDURE — 85025 COMPLETE CBC W/AUTO DIFF WBC: CPT | Performed by: HOSPITALIST

## 2022-03-18 PROCEDURE — 97166 OT EVAL MOD COMPLEX 45 MIN: CPT

## 2022-03-18 PROCEDURE — 92523 SPEECH SOUND LANG COMPREHEN: CPT

## 2022-03-18 RX ORDER — CHOLECALCIFEROL (VITAMIN D3) 125 MCG
5 CAPSULE ORAL NIGHTLY PRN
Status: DISCONTINUED | OUTPATIENT
Start: 2022-03-18 | End: 2022-03-29 | Stop reason: HOSPADM

## 2022-03-18 RX ORDER — ALPRAZOLAM 0.25 MG/1
0.25 TABLET ORAL 2 TIMES DAILY PRN
Status: DISPENSED | OUTPATIENT
Start: 2022-03-18 | End: 2022-03-25

## 2022-03-18 RX ADMIN — ALPRAZOLAM 0.25 MG: 0.25 TABLET ORAL at 21:16

## 2022-03-18 RX ADMIN — Medication 10 ML: at 21:18

## 2022-03-18 RX ADMIN — PANTOPRAZOLE SODIUM 40 MG: 40 TABLET, DELAYED RELEASE ORAL at 07:48

## 2022-03-18 RX ADMIN — MAGNESIUM GLUCONATE 500 MG ORAL TABLET 400 MG: 500 TABLET ORAL at 07:48

## 2022-03-18 RX ADMIN — LOSARTAN POTASSIUM 25 MG: 25 TABLET, FILM COATED ORAL at 07:50

## 2022-03-18 RX ADMIN — ASPIRIN 325 MG: 325 TABLET, COATED ORAL at 07:48

## 2022-03-18 RX ADMIN — ATORVASTATIN CALCIUM 40 MG: 40 TABLET, FILM COATED ORAL at 21:16

## 2022-03-18 RX ADMIN — DOCUSATE SODIUM 100 MG: 100 CAPSULE, LIQUID FILLED ORAL at 21:16

## 2022-03-18 RX ADMIN — Medication 10 ML: at 07:50

## 2022-03-18 RX ADMIN — SERTRALINE 25 MG: 50 TABLET, FILM COATED ORAL at 07:48

## 2022-03-18 RX ADMIN — ENOXAPARIN SODIUM 40 MG: 40 INJECTION SUBCUTANEOUS at 15:34

## 2022-03-18 RX ADMIN — HYDROCHLOROTHIAZIDE 25 MG: 25 TABLET ORAL at 07:48

## 2022-03-18 RX ADMIN — DOCUSATE SODIUM 100 MG: 100 CAPSULE, LIQUID FILLED ORAL at 07:48

## 2022-03-18 RX ADMIN — Medication 5 MG: at 21:16

## 2022-03-19 LAB
ANION GAP SERPL CALCULATED.3IONS-SCNC: 13 MMOL/L (ref 5–15)
BASOPHILS # BLD AUTO: 0.1 10*3/MM3 (ref 0–0.2)
BASOPHILS NFR BLD AUTO: 0.8 % (ref 0–1.5)
BUN SERPL-MCNC: 9 MG/DL (ref 6–20)
BUN/CREAT SERPL: 15.8 (ref 7–25)
CALCIUM SPEC-SCNC: 8.8 MG/DL (ref 8.6–10.5)
CHLORIDE SERPL-SCNC: 100 MMOL/L (ref 98–107)
CK SERPL-CCNC: 66 U/L (ref 20–180)
CO2 SERPL-SCNC: 26 MMOL/L (ref 22–29)
CREAT SERPL-MCNC: 0.57 MG/DL (ref 0.57–1)
DEPRECATED RDW RBC AUTO: 43.8 FL (ref 37–54)
EGFRCR SERPLBLD CKD-EPI 2021: 106.8 ML/MIN/1.73
EOSINOPHIL # BLD AUTO: 0.2 10*3/MM3 (ref 0–0.4)
EOSINOPHIL NFR BLD AUTO: 2.1 % (ref 0.3–6.2)
ERYTHROCYTE [DISTWIDTH] IN BLOOD BY AUTOMATED COUNT: 13.5 % (ref 12.3–15.4)
FOLATE SERPL-MCNC: 10.2 NG/ML (ref 4.78–24.2)
GLUCOSE SERPL-MCNC: 97 MG/DL (ref 65–99)
HBV SURFACE AG SERPL QL IA: NORMAL
HCT VFR BLD AUTO: 34.4 % (ref 34–46.6)
HCV AB SER DONR QL: NORMAL
HGB BLD-MCNC: 11.6 G/DL (ref 12–15.9)
LYMPHOCYTES # BLD AUTO: 1.1 10*3/MM3 (ref 0.7–3.1)
LYMPHOCYTES NFR BLD AUTO: 15.7 % (ref 19.6–45.3)
MCH RBC QN AUTO: 31.2 PG (ref 26.6–33)
MCHC RBC AUTO-ENTMCNC: 33.7 G/DL (ref 31.5–35.7)
MCV RBC AUTO: 92.5 FL (ref 79–97)
MONOCYTES # BLD AUTO: 0.6 10*3/MM3 (ref 0.1–0.9)
MONOCYTES NFR BLD AUTO: 8.5 % (ref 5–12)
NEUTROPHILS NFR BLD AUTO: 5.3 10*3/MM3 (ref 1.7–7)
NEUTROPHILS NFR BLD AUTO: 72.9 % (ref 42.7–76)
NRBC BLD AUTO-RTO: 0.4 /100 WBC (ref 0–0.2)
PLATELET # BLD AUTO: 364 10*3/MM3 (ref 140–450)
PMV BLD AUTO: 8 FL (ref 6–12)
POTASSIUM SERPL-SCNC: 3.4 MMOL/L (ref 3.5–5.2)
RBC # BLD AUTO: 3.71 10*6/MM3 (ref 3.77–5.28)
SODIUM SERPL-SCNC: 139 MMOL/L (ref 136–145)
TSH SERPL DL<=0.05 MIU/L-ACNC: 1.91 UIU/ML (ref 0.27–4.2)
VIT B12 BLD-MCNC: 386 PG/ML (ref 211–946)
WBC NRBC COR # BLD: 7.3 10*3/MM3 (ref 3.4–10.8)

## 2022-03-19 PROCEDURE — 82746 ASSAY OF FOLIC ACID SERUM: CPT | Performed by: PSYCHIATRY & NEUROLOGY

## 2022-03-19 PROCEDURE — 86431 RHEUMATOID FACTOR QUANT: CPT | Performed by: PSYCHIATRY & NEUROLOGY

## 2022-03-19 PROCEDURE — G0378 HOSPITAL OBSERVATION PER HR: HCPCS

## 2022-03-19 PROCEDURE — 86038 ANTINUCLEAR ANTIBODIES: CPT | Performed by: PSYCHIATRY & NEUROLOGY

## 2022-03-19 PROCEDURE — 36415 COLL VENOUS BLD VENIPUNCTURE: CPT | Performed by: HOSPITALIST

## 2022-03-19 PROCEDURE — 82607 VITAMIN B-12: CPT | Performed by: PSYCHIATRY & NEUROLOGY

## 2022-03-19 PROCEDURE — 97112 NEUROMUSCULAR REEDUCATION: CPT

## 2022-03-19 PROCEDURE — 84165 PROTEIN E-PHORESIS SERUM: CPT | Performed by: PSYCHIATRY & NEUROLOGY

## 2022-03-19 PROCEDURE — 97116 GAIT TRAINING THERAPY: CPT

## 2022-03-19 PROCEDURE — 84446 ASSAY OF VITAMIN E: CPT | Performed by: PSYCHIATRY & NEUROLOGY

## 2022-03-19 PROCEDURE — 86160 COMPLEMENT ANTIGEN: CPT | Performed by: PSYCHIATRY & NEUROLOGY

## 2022-03-19 PROCEDURE — 82550 ASSAY OF CK (CPK): CPT | Performed by: PSYCHIATRY & NEUROLOGY

## 2022-03-19 PROCEDURE — 86803 HEPATITIS C AB TEST: CPT | Performed by: PSYCHIATRY & NEUROLOGY

## 2022-03-19 PROCEDURE — 84155 ASSAY OF PROTEIN SERUM: CPT | Performed by: PSYCHIATRY & NEUROLOGY

## 2022-03-19 PROCEDURE — 85025 COMPLETE CBC W/AUTO DIFF WBC: CPT | Performed by: HOSPITALIST

## 2022-03-19 PROCEDURE — 86618 LYME DISEASE ANTIBODY: CPT | Performed by: PSYCHIATRY & NEUROLOGY

## 2022-03-19 PROCEDURE — 80048 BASIC METABOLIC PNL TOTAL CA: CPT | Performed by: HOSPITALIST

## 2022-03-19 PROCEDURE — 82085 ASSAY OF ALDOLASE: CPT | Performed by: PSYCHIATRY & NEUROLOGY

## 2022-03-19 PROCEDURE — 25010000002 ENOXAPARIN PER 10 MG: Performed by: HOSPITALIST

## 2022-03-19 PROCEDURE — 87340 HEPATITIS B SURFACE AG IA: CPT | Performed by: PSYCHIATRY & NEUROLOGY

## 2022-03-19 PROCEDURE — 97110 THERAPEUTIC EXERCISES: CPT

## 2022-03-19 PROCEDURE — 97530 THERAPEUTIC ACTIVITIES: CPT

## 2022-03-19 PROCEDURE — 84443 ASSAY THYROID STIM HORMONE: CPT | Performed by: PSYCHIATRY & NEUROLOGY

## 2022-03-19 PROCEDURE — 99232 SBSQ HOSP IP/OBS MODERATE 35: CPT | Performed by: HOSPITALIST

## 2022-03-19 RX ORDER — POTASSIUM CHLORIDE 20 MEQ/1
40 TABLET, EXTENDED RELEASE ORAL AS NEEDED
Status: DISCONTINUED | OUTPATIENT
Start: 2022-03-19 | End: 2022-03-29 | Stop reason: HOSPADM

## 2022-03-19 RX ORDER — POTASSIUM CHLORIDE 7.45 MG/ML
10 INJECTION INTRAVENOUS
Status: DISCONTINUED | OUTPATIENT
Start: 2022-03-19 | End: 2022-03-29 | Stop reason: HOSPADM

## 2022-03-19 RX ORDER — POTASSIUM CHLORIDE 1.5 G/1.77G
40 POWDER, FOR SOLUTION ORAL AS NEEDED
Status: DISCONTINUED | OUTPATIENT
Start: 2022-03-19 | End: 2022-03-29 | Stop reason: HOSPADM

## 2022-03-19 RX ORDER — AMOXICILLIN 250 MG
2 CAPSULE ORAL NIGHTLY
Status: DISCONTINUED | OUTPATIENT
Start: 2022-03-19 | End: 2022-03-29 | Stop reason: HOSPADM

## 2022-03-19 RX ADMIN — ATORVASTATIN CALCIUM 40 MG: 40 TABLET, FILM COATED ORAL at 20:40

## 2022-03-19 RX ADMIN — DOCUSATE SODIUM 100 MG: 100 CAPSULE, LIQUID FILLED ORAL at 20:40

## 2022-03-19 RX ADMIN — SENNOSIDES AND DOCUSATE SODIUM 2 TABLET: 50; 8.6 TABLET ORAL at 23:45

## 2022-03-19 RX ADMIN — ALPRAZOLAM 0.25 MG: 0.25 TABLET ORAL at 22:23

## 2022-03-19 RX ADMIN — SERTRALINE 25 MG: 50 TABLET, FILM COATED ORAL at 08:56

## 2022-03-19 RX ADMIN — Medication 5 MG: at 22:23

## 2022-03-19 RX ADMIN — POTASSIUM CHLORIDE 40 MEQ: 20 TABLET, EXTENDED RELEASE ORAL at 20:40

## 2022-03-19 RX ADMIN — PANTOPRAZOLE SODIUM 40 MG: 40 TABLET, DELAYED RELEASE ORAL at 08:57

## 2022-03-19 RX ADMIN — LOSARTAN POTASSIUM 25 MG: 25 TABLET, FILM COATED ORAL at 08:57

## 2022-03-19 RX ADMIN — ASPIRIN 325 MG: 325 TABLET, COATED ORAL at 08:57

## 2022-03-19 RX ADMIN — Medication 10 ML: at 20:40

## 2022-03-19 RX ADMIN — ENOXAPARIN SODIUM 40 MG: 40 INJECTION SUBCUTANEOUS at 17:01

## 2022-03-19 RX ADMIN — Medication 10 ML: at 08:57

## 2022-03-19 RX ADMIN — DOCUSATE SODIUM 100 MG: 100 CAPSULE, LIQUID FILLED ORAL at 08:57

## 2022-03-19 RX ADMIN — HYDROCHLOROTHIAZIDE 25 MG: 25 TABLET ORAL at 08:57

## 2022-03-19 RX ADMIN — POTASSIUM CHLORIDE 40 MEQ: 20 TABLET, EXTENDED RELEASE ORAL at 23:52

## 2022-03-19 RX ADMIN — MAGNESIUM GLUCONATE 500 MG ORAL TABLET 400 MG: 500 TABLET ORAL at 08:57

## 2022-03-20 LAB
ANA SER QL: NEGATIVE
C3 SERPL-MCNC: 154 MG/DL (ref 82–167)
C4 SERPL-MCNC: 52 MG/DL (ref 12–38)
RHEUMATOID FACT SERPL-ACNC: <10 IU/ML

## 2022-03-20 PROCEDURE — 25010000002 ENOXAPARIN PER 10 MG: Performed by: HOSPITALIST

## 2022-03-20 PROCEDURE — 99232 SBSQ HOSP IP/OBS MODERATE 35: CPT | Performed by: HOSPITALIST

## 2022-03-20 PROCEDURE — 99213 OFFICE O/P EST LOW 20 MIN: CPT | Performed by: NEUROLOGICAL SURGERY

## 2022-03-20 PROCEDURE — G0378 HOSPITAL OBSERVATION PER HR: HCPCS

## 2022-03-20 RX ADMIN — SERTRALINE 25 MG: 50 TABLET, FILM COATED ORAL at 10:29

## 2022-03-20 RX ADMIN — SENNOSIDES AND DOCUSATE SODIUM 2 TABLET: 50; 8.6 TABLET ORAL at 21:43

## 2022-03-20 RX ADMIN — BISACODYL 10 MG: 10 SUPPOSITORY RECTAL at 10:00

## 2022-03-20 RX ADMIN — Medication 5 MG: at 23:37

## 2022-03-20 RX ADMIN — PANTOPRAZOLE SODIUM 40 MG: 40 TABLET, DELAYED RELEASE ORAL at 10:29

## 2022-03-20 RX ADMIN — MAGNESIUM GLUCONATE 500 MG ORAL TABLET 400 MG: 500 TABLET ORAL at 10:29

## 2022-03-20 RX ADMIN — DOCUSATE SODIUM 100 MG: 100 CAPSULE, LIQUID FILLED ORAL at 10:29

## 2022-03-20 RX ADMIN — ATORVASTATIN CALCIUM 40 MG: 40 TABLET, FILM COATED ORAL at 21:43

## 2022-03-20 RX ADMIN — HYDROCHLOROTHIAZIDE 25 MG: 25 TABLET ORAL at 10:29

## 2022-03-20 RX ADMIN — LOSARTAN POTASSIUM 25 MG: 25 TABLET, FILM COATED ORAL at 10:29

## 2022-03-20 RX ADMIN — Medication 10 ML: at 21:44

## 2022-03-20 RX ADMIN — ASPIRIN 325 MG: 325 TABLET, COATED ORAL at 10:29

## 2022-03-20 RX ADMIN — POTASSIUM CHLORIDE 40 MEQ: 20 TABLET, EXTENDED RELEASE ORAL at 23:33

## 2022-03-20 RX ADMIN — DOCUSATE SODIUM 100 MG: 100 CAPSULE, LIQUID FILLED ORAL at 21:43

## 2022-03-20 RX ADMIN — ENOXAPARIN SODIUM 40 MG: 40 INJECTION SUBCUTANEOUS at 18:13

## 2022-03-20 RX ADMIN — ALPRAZOLAM 0.25 MG: 0.25 TABLET ORAL at 23:37

## 2022-03-20 RX ADMIN — Medication 10 ML: at 10:29

## 2022-03-21 LAB
ALBUMIN SERPL ELPH-MCNC: 3.1 G/DL (ref 2.9–4.4)
ALBUMIN/GLOB SERPL: 1 {RATIO} (ref 0.7–1.7)
ALDOLASE SERPL-CCNC: 3.6 U/L (ref 3.3–10.3)
ALPHA1 GLOB SERPL ELPH-MCNC: 0.3 G/DL (ref 0–0.4)
ALPHA2 GLOB SERPL ELPH-MCNC: 0.8 G/DL (ref 0.4–1)
B BURGDOR IGG+IGM SER-ACNC: <0.91 ISR (ref 0–0.9)
B-GLOBULIN SERPL ELPH-MCNC: 1.1 G/DL (ref 0.7–1.3)
GAMMA GLOB SERPL ELPH-MCNC: 0.9 G/DL (ref 0.4–1.8)
GLOBULIN SER CALC-MCNC: 3.1 G/DL (ref 2.2–3.9)
LABORATORY COMMENT REPORT: NORMAL
M PROTEIN SERPL ELPH-MCNC: NORMAL G/DL
POTASSIUM SERPL-SCNC: 4 MMOL/L (ref 3.5–5.2)
PROT PATTERN SERPL ELPH-IMP: NORMAL
PROT SERPL-MCNC: 6.2 G/DL (ref 6–8.5)

## 2022-03-21 PROCEDURE — 92526 ORAL FUNCTION THERAPY: CPT

## 2022-03-21 PROCEDURE — 93010 ELECTROCARDIOGRAM REPORT: CPT | Performed by: INTERNAL MEDICINE

## 2022-03-21 PROCEDURE — 97535 SELF CARE MNGMENT TRAINING: CPT

## 2022-03-21 PROCEDURE — 25010000002 ENOXAPARIN PER 10 MG: Performed by: HOSPITALIST

## 2022-03-21 PROCEDURE — 92507 TX SP LANG VOICE COMM INDIV: CPT

## 2022-03-21 PROCEDURE — 99232 SBSQ HOSP IP/OBS MODERATE 35: CPT | Performed by: PSYCHIATRY & NEUROLOGY

## 2022-03-21 PROCEDURE — 97530 THERAPEUTIC ACTIVITIES: CPT

## 2022-03-21 PROCEDURE — 84132 ASSAY OF SERUM POTASSIUM: CPT | Performed by: HOSPITALIST

## 2022-03-21 PROCEDURE — 99232 SBSQ HOSP IP/OBS MODERATE 35: CPT | Performed by: INTERNAL MEDICINE

## 2022-03-21 PROCEDURE — G0378 HOSPITAL OBSERVATION PER HR: HCPCS

## 2022-03-21 PROCEDURE — 93005 ELECTROCARDIOGRAM TRACING: CPT | Performed by: INTERNAL MEDICINE

## 2022-03-21 PROCEDURE — 97116 GAIT TRAINING THERAPY: CPT

## 2022-03-21 RX ADMIN — ENOXAPARIN SODIUM 40 MG: 40 INJECTION SUBCUTANEOUS at 15:40

## 2022-03-21 RX ADMIN — Medication 10 ML: at 08:49

## 2022-03-21 RX ADMIN — LOSARTAN POTASSIUM 25 MG: 25 TABLET, FILM COATED ORAL at 08:49

## 2022-03-21 RX ADMIN — DOCUSATE SODIUM 100 MG: 100 CAPSULE, LIQUID FILLED ORAL at 21:52

## 2022-03-21 RX ADMIN — PANTOPRAZOLE SODIUM 40 MG: 40 TABLET, DELAYED RELEASE ORAL at 08:49

## 2022-03-21 RX ADMIN — ATORVASTATIN CALCIUM 40 MG: 40 TABLET, FILM COATED ORAL at 21:52

## 2022-03-21 RX ADMIN — SERTRALINE 25 MG: 50 TABLET, FILM COATED ORAL at 08:49

## 2022-03-21 RX ADMIN — HYDROCHLOROTHIAZIDE 25 MG: 25 TABLET ORAL at 08:49

## 2022-03-21 RX ADMIN — MAGNESIUM GLUCONATE 500 MG ORAL TABLET 400 MG: 500 TABLET ORAL at 08:49

## 2022-03-21 RX ADMIN — ASPIRIN 325 MG: 325 TABLET, COATED ORAL at 08:49

## 2022-03-21 RX ADMIN — Medication 10 ML: at 21:52

## 2022-03-21 RX ADMIN — SENNOSIDES AND DOCUSATE SODIUM 2 TABLET: 50; 8.6 TABLET ORAL at 21:52

## 2022-03-21 RX ADMIN — DOCUSATE SODIUM 100 MG: 100 CAPSULE, LIQUID FILLED ORAL at 08:49

## 2022-03-22 PROCEDURE — 97110 THERAPEUTIC EXERCISES: CPT

## 2022-03-22 PROCEDURE — 99232 SBSQ HOSP IP/OBS MODERATE 35: CPT | Performed by: INTERNAL MEDICINE

## 2022-03-22 PROCEDURE — 97112 NEUROMUSCULAR REEDUCATION: CPT

## 2022-03-22 PROCEDURE — 92526 ORAL FUNCTION THERAPY: CPT

## 2022-03-22 PROCEDURE — 97530 THERAPEUTIC ACTIVITIES: CPT

## 2022-03-22 PROCEDURE — 25010000002 ENOXAPARIN PER 10 MG: Performed by: HOSPITALIST

## 2022-03-22 RX ORDER — AMOXICILLIN 250 MG
1 CAPSULE ORAL ONCE
Status: DISCONTINUED | OUTPATIENT
Start: 2022-03-22 | End: 2022-03-22 | Stop reason: SDUPTHER

## 2022-03-22 RX ORDER — POLYETHYLENE GLYCOL 3350 17 G/17G
17 POWDER, FOR SOLUTION ORAL DAILY
Status: DISCONTINUED | OUTPATIENT
Start: 2022-03-22 | End: 2022-03-29 | Stop reason: HOSPADM

## 2022-03-22 RX ORDER — TRAZODONE HYDROCHLORIDE 50 MG/1
50 TABLET ORAL NIGHTLY
COMMUNITY

## 2022-03-22 RX ADMIN — PANTOPRAZOLE SODIUM 40 MG: 40 TABLET, DELAYED RELEASE ORAL at 08:29

## 2022-03-22 RX ADMIN — DOCUSATE SODIUM 100 MG: 100 CAPSULE, LIQUID FILLED ORAL at 20:39

## 2022-03-22 RX ADMIN — ALPRAZOLAM 0.25 MG: 0.25 TABLET ORAL at 22:42

## 2022-03-22 RX ADMIN — SENNOSIDES AND DOCUSATE SODIUM 2 TABLET: 50; 8.6 TABLET ORAL at 20:38

## 2022-03-22 RX ADMIN — ENOXAPARIN SODIUM 40 MG: 40 INJECTION SUBCUTANEOUS at 16:16

## 2022-03-22 RX ADMIN — HYDROCHLOROTHIAZIDE 25 MG: 25 TABLET ORAL at 08:29

## 2022-03-22 RX ADMIN — Medication 10 ML: at 08:29

## 2022-03-22 RX ADMIN — ASPIRIN 325 MG: 325 TABLET, COATED ORAL at 08:30

## 2022-03-22 RX ADMIN — DOCUSATE SODIUM 100 MG: 100 CAPSULE, LIQUID FILLED ORAL at 08:30

## 2022-03-22 RX ADMIN — Medication 10 ML: at 20:39

## 2022-03-22 RX ADMIN — ALPRAZOLAM 0.25 MG: 0.25 TABLET ORAL at 00:15

## 2022-03-22 RX ADMIN — POLYETHYLENE GLYCOL 3350 17 G: 17 POWDER, FOR SOLUTION ORAL at 20:38

## 2022-03-22 RX ADMIN — Medication 5 MG: at 00:15

## 2022-03-22 RX ADMIN — SERTRALINE 25 MG: 50 TABLET, FILM COATED ORAL at 08:29

## 2022-03-22 RX ADMIN — LOSARTAN POTASSIUM 25 MG: 25 TABLET, FILM COATED ORAL at 08:29

## 2022-03-22 RX ADMIN — Medication 5 MG: at 22:42

## 2022-03-22 RX ADMIN — MAGNESIUM GLUCONATE 500 MG ORAL TABLET 400 MG: 500 TABLET ORAL at 08:29

## 2022-03-22 RX ADMIN — ATORVASTATIN CALCIUM 40 MG: 40 TABLET, FILM COATED ORAL at 20:39

## 2022-03-23 LAB
ALBUMIN SERPL-MCNC: 3.6 G/DL (ref 3.5–5.2)
ALBUMIN/GLOB SERPL: 1.2 G/DL
ALP SERPL-CCNC: 71 U/L (ref 39–117)
ALT SERPL W P-5'-P-CCNC: 9 U/L (ref 1–33)
ANION GAP SERPL CALCULATED.3IONS-SCNC: 12 MMOL/L (ref 5–15)
AST SERPL-CCNC: 16 U/L (ref 1–32)
BILIRUB SERPL-MCNC: 0.2 MG/DL (ref 0–1.2)
BUN SERPL-MCNC: 11 MG/DL (ref 6–20)
BUN/CREAT SERPL: 16.9 (ref 7–25)
CALCIUM SPEC-SCNC: 9 MG/DL (ref 8.6–10.5)
CHLORIDE SERPL-SCNC: 99 MMOL/L (ref 98–107)
CO2 SERPL-SCNC: 28 MMOL/L (ref 22–29)
CREAT SERPL-MCNC: 0.65 MG/DL (ref 0.57–1)
DEPRECATED RDW RBC AUTO: 45.1 FL (ref 37–54)
EGFRCR SERPLBLD CKD-EPI 2021: 103.5 ML/MIN/1.73
ERYTHROCYTE [DISTWIDTH] IN BLOOD BY AUTOMATED COUNT: 13.9 % (ref 12.3–15.4)
GLOBULIN UR ELPH-MCNC: 3 GM/DL
GLUCOSE SERPL-MCNC: 104 MG/DL (ref 65–99)
HCT VFR BLD AUTO: 37.4 % (ref 34–46.6)
HGB BLD-MCNC: 12.6 G/DL (ref 12–15.9)
MAGNESIUM SERPL-MCNC: 1.8 MG/DL (ref 1.6–2.6)
MCH RBC QN AUTO: 31.5 PG (ref 26.6–33)
MCHC RBC AUTO-ENTMCNC: 33.8 G/DL (ref 31.5–35.7)
MCV RBC AUTO: 93.2 FL (ref 79–97)
PHOSPHATE SERPL-MCNC: 4.3 MG/DL (ref 2.5–4.5)
PLATELET # BLD AUTO: 360 10*3/MM3 (ref 140–450)
PMV BLD AUTO: 7.9 FL (ref 6–12)
POTASSIUM SERPL-SCNC: 3.6 MMOL/L (ref 3.5–5.2)
PROT SERPL-MCNC: 6.6 G/DL (ref 6–8.5)
QT INTERVAL: 348 MS
RBC # BLD AUTO: 4.01 10*6/MM3 (ref 3.77–5.28)
SODIUM SERPL-SCNC: 139 MMOL/L (ref 136–145)
WBC NRBC COR # BLD: 6.6 10*3/MM3 (ref 3.4–10.8)

## 2022-03-23 PROCEDURE — 83735 ASSAY OF MAGNESIUM: CPT | Performed by: INTERNAL MEDICINE

## 2022-03-23 PROCEDURE — 97530 THERAPEUTIC ACTIVITIES: CPT

## 2022-03-23 PROCEDURE — 25010000002 ENOXAPARIN PER 10 MG: Performed by: HOSPITALIST

## 2022-03-23 PROCEDURE — 80053 COMPREHEN METABOLIC PANEL: CPT | Performed by: INTERNAL MEDICINE

## 2022-03-23 PROCEDURE — 85027 COMPLETE CBC AUTOMATED: CPT | Performed by: INTERNAL MEDICINE

## 2022-03-23 PROCEDURE — 97116 GAIT TRAINING THERAPY: CPT

## 2022-03-23 PROCEDURE — 84100 ASSAY OF PHOSPHORUS: CPT | Performed by: INTERNAL MEDICINE

## 2022-03-23 PROCEDURE — 92507 TX SP LANG VOICE COMM INDIV: CPT

## 2022-03-23 PROCEDURE — 99232 SBSQ HOSP IP/OBS MODERATE 35: CPT | Performed by: INTERNAL MEDICINE

## 2022-03-23 RX ADMIN — ATORVASTATIN CALCIUM 40 MG: 40 TABLET, FILM COATED ORAL at 21:05

## 2022-03-23 RX ADMIN — MAGNESIUM GLUCONATE 500 MG ORAL TABLET 400 MG: 500 TABLET ORAL at 09:22

## 2022-03-23 RX ADMIN — SENNOSIDES AND DOCUSATE SODIUM 2 TABLET: 50; 8.6 TABLET ORAL at 21:05

## 2022-03-23 RX ADMIN — Medication 10 ML: at 21:06

## 2022-03-23 RX ADMIN — POLYETHYLENE GLYCOL 3350 17 G: 17 POWDER, FOR SOLUTION ORAL at 09:22

## 2022-03-23 RX ADMIN — ASPIRIN 325 MG: 325 TABLET, COATED ORAL at 09:22

## 2022-03-23 RX ADMIN — DOCUSATE SODIUM 100 MG: 100 CAPSULE, LIQUID FILLED ORAL at 09:22

## 2022-03-23 RX ADMIN — ENOXAPARIN SODIUM 40 MG: 40 INJECTION SUBCUTANEOUS at 16:16

## 2022-03-23 RX ADMIN — LOSARTAN POTASSIUM 25 MG: 25 TABLET, FILM COATED ORAL at 09:22

## 2022-03-23 RX ADMIN — Medication 10 ML: at 09:22

## 2022-03-23 RX ADMIN — BISACODYL 10 MG: 10 SUPPOSITORY RECTAL at 16:17

## 2022-03-23 RX ADMIN — Medication 5 MG: at 22:40

## 2022-03-23 RX ADMIN — DOCUSATE SODIUM 100 MG: 100 CAPSULE, LIQUID FILLED ORAL at 21:05

## 2022-03-23 RX ADMIN — ALPRAZOLAM 0.25 MG: 0.25 TABLET ORAL at 22:40

## 2022-03-23 RX ADMIN — HYDROCHLOROTHIAZIDE 25 MG: 25 TABLET ORAL at 09:22

## 2022-03-23 RX ADMIN — SERTRALINE 25 MG: 50 TABLET, FILM COATED ORAL at 09:22

## 2022-03-24 PROCEDURE — 97530 THERAPEUTIC ACTIVITIES: CPT

## 2022-03-24 PROCEDURE — 97110 THERAPEUTIC EXERCISES: CPT

## 2022-03-24 PROCEDURE — 97535 SELF CARE MNGMENT TRAINING: CPT

## 2022-03-24 PROCEDURE — 25010000002 MAGNESIUM SULFATE IN D5W 1G/100ML (PREMIX) 1-5 GM/100ML-% SOLUTION: Performed by: INTERNAL MEDICINE

## 2022-03-24 PROCEDURE — 99232 SBSQ HOSP IP/OBS MODERATE 35: CPT | Performed by: INTERNAL MEDICINE

## 2022-03-24 PROCEDURE — 25010000002 ENOXAPARIN PER 10 MG: Performed by: HOSPITALIST

## 2022-03-24 RX ORDER — MAGNESIUM SULFATE 1 G/100ML
1 INJECTION INTRAVENOUS ONCE
Status: COMPLETED | OUTPATIENT
Start: 2022-03-24 | End: 2022-03-24

## 2022-03-24 RX ADMIN — ENOXAPARIN SODIUM 40 MG: 40 INJECTION SUBCUTANEOUS at 15:58

## 2022-03-24 RX ADMIN — ASPIRIN 325 MG: 325 TABLET, COATED ORAL at 09:06

## 2022-03-24 RX ADMIN — ATORVASTATIN CALCIUM 40 MG: 40 TABLET, FILM COATED ORAL at 21:38

## 2022-03-24 RX ADMIN — ALPRAZOLAM 0.25 MG: 0.25 TABLET ORAL at 23:51

## 2022-03-24 RX ADMIN — LOSARTAN POTASSIUM 25 MG: 25 TABLET, FILM COATED ORAL at 09:06

## 2022-03-24 RX ADMIN — Medication 5 MG: at 23:51

## 2022-03-24 RX ADMIN — Medication 10 ML: at 09:06

## 2022-03-24 RX ADMIN — SERTRALINE 25 MG: 50 TABLET, FILM COATED ORAL at 09:06

## 2022-03-24 RX ADMIN — Medication 10 ML: at 21:38

## 2022-03-24 RX ADMIN — MAGNESIUM GLUCONATE 500 MG ORAL TABLET 400 MG: 500 TABLET ORAL at 09:06

## 2022-03-24 RX ADMIN — HYDROCHLOROTHIAZIDE 25 MG: 25 TABLET ORAL at 09:06

## 2022-03-24 RX ADMIN — SENNOSIDES AND DOCUSATE SODIUM 2 TABLET: 50; 8.6 TABLET ORAL at 21:38

## 2022-03-24 RX ADMIN — MAGNESIUM SULFATE 1 G: 1 INJECTION INTRAVENOUS at 15:58

## 2022-03-25 LAB
A-TOCOPHEROL VIT E SERPL-MCNC: 9.9 MG/L (ref 7–25.1)
GAMMA TOCOPHEROL SERPL-MCNC: 1 MG/L (ref 0.5–5.5)

## 2022-03-25 PROCEDURE — 97530 THERAPEUTIC ACTIVITIES: CPT

## 2022-03-25 PROCEDURE — 99232 SBSQ HOSP IP/OBS MODERATE 35: CPT | Performed by: INTERNAL MEDICINE

## 2022-03-25 PROCEDURE — 25010000002 ENOXAPARIN PER 10 MG: Performed by: HOSPITALIST

## 2022-03-25 PROCEDURE — 97116 GAIT TRAINING THERAPY: CPT

## 2022-03-25 RX ORDER — ALPRAZOLAM 0.25 MG/1
0.25 TABLET ORAL 2 TIMES DAILY PRN
Status: DISCONTINUED | OUTPATIENT
Start: 2022-03-25 | End: 2022-03-29 | Stop reason: HOSPADM

## 2022-03-25 RX ADMIN — SENNOSIDES AND DOCUSATE SODIUM 2 TABLET: 50; 8.6 TABLET ORAL at 21:42

## 2022-03-25 RX ADMIN — ATORVASTATIN CALCIUM 40 MG: 40 TABLET, FILM COATED ORAL at 21:42

## 2022-03-25 RX ADMIN — Medication 10 ML: at 21:42

## 2022-03-25 RX ADMIN — DOCUSATE SODIUM 100 MG: 100 CAPSULE, LIQUID FILLED ORAL at 08:27

## 2022-03-25 RX ADMIN — HYDROCHLOROTHIAZIDE 25 MG: 25 TABLET ORAL at 08:27

## 2022-03-25 RX ADMIN — ENOXAPARIN SODIUM 40 MG: 40 INJECTION SUBCUTANEOUS at 15:55

## 2022-03-25 RX ADMIN — Medication 10 ML: at 08:27

## 2022-03-25 RX ADMIN — MAGNESIUM GLUCONATE 500 MG ORAL TABLET 400 MG: 500 TABLET ORAL at 08:27

## 2022-03-25 RX ADMIN — Medication 5 MG: at 23:24

## 2022-03-25 RX ADMIN — LOSARTAN POTASSIUM 25 MG: 25 TABLET, FILM COATED ORAL at 08:27

## 2022-03-25 RX ADMIN — ASPIRIN 325 MG: 325 TABLET, COATED ORAL at 08:26

## 2022-03-25 RX ADMIN — ALPRAZOLAM 0.25 MG: 0.25 TABLET ORAL at 23:24

## 2022-03-25 RX ADMIN — POLYETHYLENE GLYCOL 3350 17 G: 17 POWDER, FOR SOLUTION ORAL at 08:27

## 2022-03-25 RX ADMIN — SERTRALINE 25 MG: 50 TABLET, FILM COATED ORAL at 08:29

## 2022-03-26 PROCEDURE — 99232 SBSQ HOSP IP/OBS MODERATE 35: CPT | Performed by: INTERNAL MEDICINE

## 2022-03-26 PROCEDURE — 25010000002 ENOXAPARIN PER 10 MG: Performed by: HOSPITALIST

## 2022-03-26 RX ADMIN — ALPRAZOLAM 0.25 MG: 0.25 TABLET ORAL at 22:05

## 2022-03-26 RX ADMIN — MAGNESIUM GLUCONATE 500 MG ORAL TABLET 400 MG: 500 TABLET ORAL at 09:38

## 2022-03-26 RX ADMIN — Medication 5 MG: at 22:05

## 2022-03-26 RX ADMIN — HYDROCHLOROTHIAZIDE 25 MG: 25 TABLET ORAL at 09:38

## 2022-03-26 RX ADMIN — ATORVASTATIN CALCIUM 40 MG: 40 TABLET, FILM COATED ORAL at 22:00

## 2022-03-26 RX ADMIN — Medication 10 ML: at 09:38

## 2022-03-26 RX ADMIN — POLYETHYLENE GLYCOL 3350 17 G: 17 POWDER, FOR SOLUTION ORAL at 09:38

## 2022-03-26 RX ADMIN — ENOXAPARIN SODIUM 40 MG: 40 INJECTION SUBCUTANEOUS at 16:47

## 2022-03-26 RX ADMIN — ASPIRIN 325 MG: 325 TABLET, COATED ORAL at 09:38

## 2022-03-26 RX ADMIN — LOSARTAN POTASSIUM 25 MG: 25 TABLET, FILM COATED ORAL at 09:38

## 2022-03-26 RX ADMIN — Medication 10 ML: at 22:00

## 2022-03-26 RX ADMIN — SERTRALINE 25 MG: 50 TABLET, FILM COATED ORAL at 09:38

## 2022-03-27 PROCEDURE — 80053 COMPREHEN METABOLIC PANEL: CPT | Performed by: INTERNAL MEDICINE

## 2022-03-27 PROCEDURE — 83735 ASSAY OF MAGNESIUM: CPT | Performed by: INTERNAL MEDICINE

## 2022-03-27 PROCEDURE — 25010000002 ENOXAPARIN PER 10 MG: Performed by: HOSPITALIST

## 2022-03-27 PROCEDURE — 97116 GAIT TRAINING THERAPY: CPT

## 2022-03-27 PROCEDURE — 85027 COMPLETE CBC AUTOMATED: CPT | Performed by: INTERNAL MEDICINE

## 2022-03-27 PROCEDURE — 99232 SBSQ HOSP IP/OBS MODERATE 35: CPT | Performed by: INTERNAL MEDICINE

## 2022-03-27 PROCEDURE — 84100 ASSAY OF PHOSPHORUS: CPT | Performed by: INTERNAL MEDICINE

## 2022-03-27 PROCEDURE — 97112 NEUROMUSCULAR REEDUCATION: CPT

## 2022-03-27 RX ADMIN — Medication 5 MG: at 22:36

## 2022-03-27 RX ADMIN — ASPIRIN 325 MG: 325 TABLET, COATED ORAL at 10:37

## 2022-03-27 RX ADMIN — LOSARTAN POTASSIUM 25 MG: 25 TABLET, FILM COATED ORAL at 10:37

## 2022-03-27 RX ADMIN — Medication 10 ML: at 20:44

## 2022-03-27 RX ADMIN — ALPRAZOLAM 0.25 MG: 0.25 TABLET ORAL at 22:36

## 2022-03-27 RX ADMIN — SENNOSIDES AND DOCUSATE SODIUM 2 TABLET: 50; 8.6 TABLET ORAL at 20:44

## 2022-03-27 RX ADMIN — ATORVASTATIN CALCIUM 40 MG: 40 TABLET, FILM COATED ORAL at 20:44

## 2022-03-27 RX ADMIN — ENOXAPARIN SODIUM 40 MG: 40 INJECTION SUBCUTANEOUS at 17:06

## 2022-03-27 RX ADMIN — SERTRALINE 25 MG: 50 TABLET, FILM COATED ORAL at 10:37

## 2022-03-27 RX ADMIN — Medication 10 ML: at 10:37

## 2022-03-27 RX ADMIN — HYDROCHLOROTHIAZIDE 25 MG: 25 TABLET ORAL at 10:37

## 2022-03-27 RX ADMIN — MAGNESIUM GLUCONATE 500 MG ORAL TABLET 400 MG: 500 TABLET ORAL at 10:37

## 2022-03-28 LAB
ALBUMIN SERPL-MCNC: 3.6 G/DL (ref 3.5–5.2)
ALBUMIN/GLOB SERPL: 1.2 G/DL
ALP SERPL-CCNC: 82 U/L (ref 39–117)
ALT SERPL W P-5'-P-CCNC: 16 U/L (ref 1–33)
ANION GAP SERPL CALCULATED.3IONS-SCNC: 12 MMOL/L (ref 5–15)
AST SERPL-CCNC: 29 U/L (ref 1–32)
BILIRUB SERPL-MCNC: 0.2 MG/DL (ref 0–1.2)
BUN SERPL-MCNC: 11 MG/DL (ref 6–20)
BUN/CREAT SERPL: 22.4 (ref 7–25)
CALCIUM SPEC-SCNC: 9.1 MG/DL (ref 8.6–10.5)
CHLORIDE SERPL-SCNC: 100 MMOL/L (ref 98–107)
CO2 SERPL-SCNC: 24 MMOL/L (ref 22–29)
CREAT SERPL-MCNC: 0.49 MG/DL (ref 0.57–1)
DEPRECATED RDW RBC AUTO: 43.8 FL (ref 37–54)
EGFRCR SERPLBLD CKD-EPI 2021: 110.8 ML/MIN/1.73
ERYTHROCYTE [DISTWIDTH] IN BLOOD BY AUTOMATED COUNT: 13.6 % (ref 12.3–15.4)
GLOBULIN UR ELPH-MCNC: 3 GM/DL
GLUCOSE SERPL-MCNC: 99 MG/DL (ref 65–99)
HCT VFR BLD AUTO: 36 % (ref 34–46.6)
HGB BLD-MCNC: 12.4 G/DL (ref 12–15.9)
MAGNESIUM SERPL-MCNC: 1.9 MG/DL (ref 1.6–2.6)
MCH RBC QN AUTO: 31.5 PG (ref 26.6–33)
MCHC RBC AUTO-ENTMCNC: 34.5 G/DL (ref 31.5–35.7)
MCV RBC AUTO: 91.2 FL (ref 79–97)
PHOSPHATE SERPL-MCNC: 4.1 MG/DL (ref 2.5–4.5)
PLATELET # BLD AUTO: 349 10*3/MM3 (ref 140–450)
PMV BLD AUTO: 7.7 FL (ref 6–12)
POTASSIUM SERPL-SCNC: 3.9 MMOL/L (ref 3.5–5.2)
PROT SERPL-MCNC: 6.6 G/DL (ref 6–8.5)
RBC # BLD AUTO: 3.95 10*6/MM3 (ref 3.77–5.28)
SODIUM SERPL-SCNC: 136 MMOL/L (ref 136–145)
WBC NRBC COR # BLD: 8.1 10*3/MM3 (ref 3.4–10.8)

## 2022-03-28 PROCEDURE — 97530 THERAPEUTIC ACTIVITIES: CPT

## 2022-03-28 PROCEDURE — 92507 TX SP LANG VOICE COMM INDIV: CPT

## 2022-03-28 PROCEDURE — 99232 SBSQ HOSP IP/OBS MODERATE 35: CPT | Performed by: STUDENT IN AN ORGANIZED HEALTH CARE EDUCATION/TRAINING PROGRAM

## 2022-03-28 PROCEDURE — 97110 THERAPEUTIC EXERCISES: CPT

## 2022-03-28 PROCEDURE — 97116 GAIT TRAINING THERAPY: CPT

## 2022-03-28 PROCEDURE — 25010000002 ENOXAPARIN PER 10 MG: Performed by: HOSPITALIST

## 2022-03-28 RX ADMIN — ENOXAPARIN SODIUM 40 MG: 40 INJECTION SUBCUTANEOUS at 16:40

## 2022-03-28 RX ADMIN — ALPRAZOLAM 0.25 MG: 0.25 TABLET ORAL at 22:32

## 2022-03-28 RX ADMIN — LOSARTAN POTASSIUM 25 MG: 25 TABLET, FILM COATED ORAL at 09:05

## 2022-03-28 RX ADMIN — Medication 10 ML: at 20:37

## 2022-03-28 RX ADMIN — POLYETHYLENE GLYCOL 3350 17 G: 17 POWDER, FOR SOLUTION ORAL at 09:05

## 2022-03-28 RX ADMIN — Medication 10 ML: at 09:05

## 2022-03-28 RX ADMIN — MAGNESIUM GLUCONATE 500 MG ORAL TABLET 400 MG: 500 TABLET ORAL at 09:05

## 2022-03-28 RX ADMIN — HYDROCHLOROTHIAZIDE 25 MG: 25 TABLET ORAL at 09:05

## 2022-03-28 RX ADMIN — ATORVASTATIN CALCIUM 40 MG: 40 TABLET, FILM COATED ORAL at 20:36

## 2022-03-28 RX ADMIN — SENNOSIDES AND DOCUSATE SODIUM 2 TABLET: 50; 8.6 TABLET ORAL at 20:36

## 2022-03-28 RX ADMIN — SERTRALINE 25 MG: 50 TABLET, FILM COATED ORAL at 09:05

## 2022-03-28 RX ADMIN — DOCUSATE SODIUM 100 MG: 100 CAPSULE, LIQUID FILLED ORAL at 09:05

## 2022-03-28 RX ADMIN — ASPIRIN 325 MG: 325 TABLET, COATED ORAL at 09:05

## 2022-03-28 RX ADMIN — Medication 5 MG: at 22:32

## 2022-03-29 VITALS
SYSTOLIC BLOOD PRESSURE: 135 MMHG | WEIGHT: 178.35 LBS | OXYGEN SATURATION: 97 % | HEART RATE: 89 BPM | DIASTOLIC BLOOD PRESSURE: 83 MMHG | HEIGHT: 70 IN | RESPIRATION RATE: 16 BRPM | BODY MASS INDEX: 25.53 KG/M2 | TEMPERATURE: 97.8 F

## 2022-03-29 LAB
ANION GAP SERPL CALCULATED.3IONS-SCNC: 13 MMOL/L (ref 5–15)
BUN SERPL-MCNC: 11 MG/DL (ref 6–20)
BUN/CREAT SERPL: 18 (ref 7–25)
CALCIUM SPEC-SCNC: 9.3 MG/DL (ref 8.6–10.5)
CHLORIDE SERPL-SCNC: 97 MMOL/L (ref 98–107)
CO2 SERPL-SCNC: 26 MMOL/L (ref 22–29)
CREAT SERPL-MCNC: 0.61 MG/DL (ref 0.57–1)
DEPRECATED RDW RBC AUTO: 42 FL (ref 37–54)
EGFRCR SERPLBLD CKD-EPI 2021: 105.1 ML/MIN/1.73
ERYTHROCYTE [DISTWIDTH] IN BLOOD BY AUTOMATED COUNT: 13.4 % (ref 12.3–15.4)
GLUCOSE SERPL-MCNC: 98 MG/DL (ref 65–99)
HCT VFR BLD AUTO: 36 % (ref 34–46.6)
HGB BLD-MCNC: 12.8 G/DL (ref 12–15.9)
MCH RBC QN AUTO: 32.4 PG (ref 26.6–33)
MCHC RBC AUTO-ENTMCNC: 35.6 G/DL (ref 31.5–35.7)
MCV RBC AUTO: 91 FL (ref 79–97)
PLATELET # BLD AUTO: 363 10*3/MM3 (ref 140–450)
PMV BLD AUTO: 7.6 FL (ref 6–12)
POTASSIUM SERPL-SCNC: 3.8 MMOL/L (ref 3.5–5.2)
RBC # BLD AUTO: 3.95 10*6/MM3 (ref 3.77–5.28)
SODIUM SERPL-SCNC: 136 MMOL/L (ref 136–145)
WBC NRBC COR # BLD: 7.4 10*3/MM3 (ref 3.4–10.8)

## 2022-03-29 PROCEDURE — 80048 BASIC METABOLIC PNL TOTAL CA: CPT | Performed by: STUDENT IN AN ORGANIZED HEALTH CARE EDUCATION/TRAINING PROGRAM

## 2022-03-29 PROCEDURE — 85027 COMPLETE CBC AUTOMATED: CPT | Performed by: STUDENT IN AN ORGANIZED HEALTH CARE EDUCATION/TRAINING PROGRAM

## 2022-03-29 PROCEDURE — 92507 TX SP LANG VOICE COMM INDIV: CPT

## 2022-03-29 PROCEDURE — 99239 HOSP IP/OBS DSCHRG MGMT >30: CPT | Performed by: STUDENT IN AN ORGANIZED HEALTH CARE EDUCATION/TRAINING PROGRAM

## 2022-03-29 RX ORDER — ASPIRIN 325 MG
325 TABLET, DELAYED RELEASE (ENTERIC COATED) ORAL DAILY
Qty: 30 TABLET | Refills: 2
Start: 2022-03-30

## 2022-03-29 RX ORDER — AMOXICILLIN 250 MG
2 CAPSULE ORAL NIGHTLY
Qty: 60 TABLET | Refills: 0
Start: 2022-03-29

## 2022-03-29 RX ORDER — ATORVASTATIN CALCIUM 40 MG/1
40 TABLET, FILM COATED ORAL NIGHTLY
Qty: 30 TABLET | Refills: 2
Start: 2022-03-29

## 2022-03-29 RX ADMIN — ASPIRIN 325 MG: 325 TABLET, COATED ORAL at 08:09

## 2022-03-29 RX ADMIN — POLYETHYLENE GLYCOL 3350 17 G: 17 POWDER, FOR SOLUTION ORAL at 08:10

## 2022-03-29 RX ADMIN — MAGNESIUM GLUCONATE 500 MG ORAL TABLET 400 MG: 500 TABLET ORAL at 08:09

## 2022-03-29 RX ADMIN — HYDROCHLOROTHIAZIDE 25 MG: 25 TABLET ORAL at 08:09

## 2022-03-29 RX ADMIN — SERTRALINE 25 MG: 50 TABLET, FILM COATED ORAL at 08:10

## 2022-03-29 RX ADMIN — DOCUSATE SODIUM 100 MG: 100 CAPSULE, LIQUID FILLED ORAL at 08:10

## 2022-03-29 RX ADMIN — Medication 10 ML: at 08:09

## 2022-03-29 RX ADMIN — LOSARTAN POTASSIUM 25 MG: 25 TABLET, FILM COATED ORAL at 08:09

## 2022-04-04 DIAGNOSIS — G95.9 CERVICAL MYELOPATHY: Primary | ICD-10-CM

## 2022-04-04 DIAGNOSIS — M50.00 INTERVERTEBRAL CERVICAL DISC DISORDER WITH MYELOPATHY, CERVICAL REGION: ICD-10-CM

## 2022-04-07 ENCOUNTER — APPOINTMENT (OUTPATIENT)
Dept: MRI IMAGING | Facility: HOSPITAL | Age: 57
End: 2022-04-07

## 2022-04-26 ENCOUNTER — TELEPHONE (OUTPATIENT)
Dept: NEUROSURGERY | Facility: CLINIC | Age: 57
End: 2022-04-26

## 2022-04-26 NOTE — TELEPHONE ENCOUNTER
Caller: KVNG     Relationship: [facility]     Best call back number: [phone]    What orders are you requesting (i.e. lab or imaging): EMG     Additional notes: KVNG WITH [facility] IS CALLING REQUESTING ORDERS FOR  AN EMG. SHE STATED THIS WOULD BE APPROVED UNDER WORKERS COMP.     PLEASE GIVE HER A CALL BACK WITH AN UPDATE. THANK YOU.

## 2022-04-26 NOTE — TELEPHONE ENCOUNTER
Called Sandy back and let her know that the patient has been referred to neurology and that they would be the ones to order an EMG. Sandy did not know that the patient had been referred to neurology or who the patient's neurologist was. I offered to give her the information, but she decided that she would just talk to [facility] and refused the info.

## 2022-04-29 ENCOUNTER — APPOINTMENT (OUTPATIENT)
Dept: CT IMAGING | Facility: HOSPITAL | Age: 57
End: 2022-04-29

## 2022-05-11 NOTE — PROGRESS NOTES
Neurosurgical Consultation      Marjorie FLORENTINO is a 57 y.o. female is here today for a 3 month follow-up.    No chief complaint on file.       Previous treatment:    HPI: This is a 57-year-old woman who was initially evaluated by me in January 2022.  She suffered a fall in October 2021 and then had onset of neck and back pain as well as hand and leg weakness.  Upon our evaluation she was found to have signs and symptoms of cervical myelopathy as well as an MRI concerning for two-level spinal cord compression.  She underwent surgical intervention with a two-level anterior cervical discectomy and fusion.  Postoperatively she had significant dysphagia and dysarthria and was unable to obtain sufficient nutrition and required placement of a PEG tube.  She was discharged to a rehab facility where they noticed further decline in motor function as well as speech issues.  She returned to the emergency department and an MRI was obtained.  This MRI was not indicative of active ongoing compressive pathology.  With her relative rapid decline and absence of ongoing compressive pathology a neurology consult was obtained.  Upon their evaluation there was concern for upper and lower motor neuron dysfunction and she was referred for EMG/nerve conduction study.  This study was accomplished on May 5, 2022 and reveals abnormalities on nerve conduction study and EMG of the bilateral upper and right lower extremity as well as abnormalities being worse in the upper extremities in the right lower extremity.  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  The proposed differential diagnosis was multifocal motor neuropathy, ALS, general peripheral motor polyneuropathy.  She is being referred to a neuromuscular disorder specialist.    Upon my evaluation today the patient is present in a wheelchair.  She has contractions of her hands with significant difficulty moving the upper extremities.  She has severe speech difficulties  including dysarthria.  Her cervical incision has unremarkable.    Past Medical History:   Diagnosis Date   • Depression    • Hypertension         Past Surgical History:   Procedure Laterality Date   • ANTERIOR CERVICAL DISCECTOMY W/ FUSION Bilateral 2022    Procedure: CERVICAL DISCECTOMY ANTERIOR WITH FUSION C3-C5;  Surgeon: Max Meyers MD;  Location: [facility] MAIN OR;  Service: Neurosurgery;  Laterality: Bilateral;   •  SECTION      x3    • ENDOSCOPY W/ PEG TUBE PLACEMENT N/A 2022    Procedure: ESOPHAGOGASTRODUODENOSCOPY WITH PERCUTANEOUS ENDOSCOPIC GASTROSTOMY TUBE INSERTION and biopsy x 1;  Surgeon: Luis Antonio Ochoa MD;  Location: [facility] ENDOSCOPY;  Service: Gastroenterology;  Laterality: N/A;  gastritis, duodenitis, hiatal hernia, schlotzsky's ring, pharyngeal edema        Current Outpatient Medications on File Prior to Visit   Medication Sig Dispense Refill   • acetaminophen (TYLENOL) 325 MG tablet Take 650 mg by mouth Every 4 (Four) Hours As Needed for Mild Pain .     • aspirin  MG tablet Take 1 tablet by mouth Daily. 30 tablet 2   • atorvastatin (LIPITOR) 40 MG tablet Take 1 tablet by mouth Every Night. 30 tablet 2   • bisacodyl (DULCOLAX) 10 MG suppository Insert 10 mg into the rectum Daily As Needed for Constipation.     • Cholecalciferol (Vitamin D) 50 MCG (2000 UT) capsule Take 2,000 Units by mouth Daily.     • cyclobenzaprine (FLEXERIL) 10 MG tablet Take 1 tablet by mouth 3 (Three) Times a Day As Needed for Muscle Spasms.     • docusate sodium (COLACE) 100 MG capsule Take 100 mg by mouth 2 (Two) Times a Day.     • guaiFENesin (MUCINEX) 600 MG 12 hr tablet Take 600 mg by mouth 2 (Two) Times a Day.     • hydroCHLOROthiazide (HYDRODIURIL) 25 MG tablet TAKE ONE TABLET BY MOUTH DAILY 90 tablet 1   • losartan (Cozaar) 25 MG tablet Take 1 tablet by mouth Daily. 30 tablet 3   • magnesium gluconate (MAGONATE) 500 MG tablet Take 250 mg by mouth Daily.     • magnesium oxide (MAGOX) 400  "(241.3 Mg) MG tablet tablet Take 400 mg by mouth Daily.     • Melatonin 3 MG capsule Take  by mouth.     • sennosides-docusate (PERICOLACE) 8.6-50 MG per tablet Take 2 tablets by mouth Every Night. 60 tablet 0   • sertraline (ZOLOFT) 25 MG tablet Take 25 mg by mouth Daily.     • traZODone (DESYREL) 50 MG tablet Take 50 mg by mouth Every Night.     • Turmeric (QC TUMERIC COMPLEX PO) Take  by mouth Daily.       No current facility-administered medications on file prior to visit.        Allergies   Allergen Reactions   • Ibuprofen Angioedema   • Lisinopril Hives        Social History     Socioeconomic History   • Marital status:    • Number of children: 3   Tobacco Use   • Smoking status: Never Smoker   • Smokeless tobacco: Never Used   Vaping Use   • Vaping Use: Never used   Substance and Sexual Activity   • Alcohol use: Never   • Drug use: Defer   • Sexual activity: Defer     Partners: Male     Birth control/protection: None          Review of Systems   Constitutional: Positive for activity change.   HENT: Negative.    Eyes: Negative.    Respiratory: Negative.  Negative for chest tightness and shortness of breath.    Cardiovascular: Negative.  Negative for chest pain.   Gastrointestinal: Negative.    Endocrine: Negative.    Genitourinary: Negative.    Musculoskeletal: Positive for back pain, gait problem ( in a wheelchair) and myalgias.        Bilateral leg pain   Skin: Negative.    Allergic/Immunologic: Negative.    Neurological: Positive for weakness ( all extremities).   Hematological: Negative.    Psychiatric/Behavioral: Positive for sleep disturbance.        Physical Examination:     Vitals:    05/12/22 0942   BP: 140/92   Pulse: 86   Resp: 16   Temp: 98.4 °F (36.9 °C)   SpO2: 98%   Weight: 80.7 kg (178 lb)   Height: 177.8 cm (70\")        Physical Exam  Neurological:      Cranial Nerves: Dysarthria present.          Neurological Exam  Mental Status  Awake, alert and oriented to person, place and time. " Severe dysarthria present.    Motor    3 out of 5 strength in the upper extremities with 2 out of 5 strength in .  3-5 strength in the lower extremities..    Gait    Deferred.     Bilateral tongue fasciculations evident on physical exam.    Result Review  The following data was reviewed by: Max Meyers MD on 05/12/2022:    Data reviewed: Radiologic studies MRI from March 2022 of the cervical spine shows adequate decompression of the spinal cord.  No hardware malfunction.     Assessment/plan:  This is a 57-year-old woman who presented with history and physical exam consistent with cervical myelopathy who underwent urgent decompression secondary to progression of disease.  Postoperatively she has had decline in motor function including speech dysarthria and severe dysphagia requiring PEG tube.  Confirmatory imaging does not suggest active continued compression of the spinal cord.  She is now being worked up for a motor polyneuropathy and has a referral to a neuromuscular specialist.  We continue to work to better understand the origin of her symptoms as well as the contribution from her previous trauma as well as underlying pathology.  At this juncture I will defer further work-up and plan of care to the neurology team.  However we are always available for further questions or concerns.    Diagnoses and all orders for this visit:    1. Cervical myelopathy (HCC) (Primary)         Return if symptoms worsen or fail to improve.            Max Meyers MD

## 2022-05-12 ENCOUNTER — OFFICE VISIT (OUTPATIENT)
Dept: NEUROSURGERY | Facility: CLINIC | Age: 57
End: 2022-05-12

## 2022-05-12 VITALS
HEART RATE: 86 BPM | RESPIRATION RATE: 16 BRPM | HEIGHT: 70 IN | SYSTOLIC BLOOD PRESSURE: 140 MMHG | BODY MASS INDEX: 25.48 KG/M2 | TEMPERATURE: 98.4 F | WEIGHT: 178 LBS | OXYGEN SATURATION: 98 % | DIASTOLIC BLOOD PRESSURE: 92 MMHG

## 2022-05-12 DIAGNOSIS — G95.9 CERVICAL MYELOPATHY: Primary | ICD-10-CM

## 2022-05-12 PROCEDURE — 99214 OFFICE O/P EST MOD 30 MIN: CPT | Performed by: NEUROLOGICAL SURGERY

## 2022-05-13 ENCOUNTER — TELEPHONE (OUTPATIENT)
Dept: NEUROSURGERY | Facility: CLINIC | Age: 57
End: 2022-05-13

## 2022-05-13 NOTE — TELEPHONE ENCOUNTER
Caller: KVNG       Relationship: FRANDY    Best call back number: [phone]    What form or medical record are you requesting: OVN 5/12/22 AND WORK STATUS AND WHEN NEXT VISIT IS    Who is requesting this form or medical record from you: FRANDY    How would you like to receive the form or medical records (pick-up, mail, fax): FAX  If fax, what is the fax number: [phone]    Timeframe paperwork needed:   AS SOON AS POSSIBLE    Additional notes: KVNG CALLED AND STATES THE PT WAS IN YESTERDAY AND SEEN DR. ROSALES.  KVNG STATES SHE NEEDS THE OVN, NEXT APPT AND THE WORK STATUS FOR THE PATIENT TO CONTINUE HER BENEFITS.    PLEASE CALL KVNG  THANK YOU

## 2022-05-16 ENCOUNTER — LAB REQUISITION (OUTPATIENT)
Dept: LAB | Facility: HOSPITAL | Age: 57
End: 2022-05-16

## 2022-05-16 DIAGNOSIS — Z00.00 ENCOUNTER FOR GENERAL ADULT MEDICAL EXAMINATION WITHOUT ABNORMAL FINDINGS: ICD-10-CM

## 2022-05-16 LAB
ANION GAP SERPL CALCULATED.3IONS-SCNC: 11 MMOL/L (ref 5–15)
BUN SERPL-MCNC: 10 MG/DL (ref 6–20)
BUN/CREAT SERPL: 20 (ref 7–25)
CALCIUM SPEC-SCNC: 8.9 MG/DL (ref 8.6–10.5)
CHLORIDE SERPL-SCNC: 95 MMOL/L (ref 98–107)
CO2 SERPL-SCNC: 27 MMOL/L (ref 22–29)
CREAT SERPL-MCNC: 0.5 MG/DL (ref 0.57–1)
DEPRECATED RDW RBC AUTO: 45.1 FL (ref 37–54)
EGFRCR SERPLBLD CKD-EPI 2021: 110.2 ML/MIN/1.73
ERYTHROCYTE [DISTWIDTH] IN BLOOD BY AUTOMATED COUNT: 13.8 % (ref 12.3–15.4)
GLUCOSE SERPL-MCNC: 99 MG/DL (ref 65–99)
HCT VFR BLD AUTO: 34 % (ref 34–46.6)
HGB BLD-MCNC: 11.1 G/DL (ref 12–15.9)
MCH RBC QN AUTO: 30.1 PG (ref 26.6–33)
MCHC RBC AUTO-ENTMCNC: 32.7 G/DL (ref 31.5–35.7)
MCV RBC AUTO: 92.1 FL (ref 79–97)
PLATELET # BLD AUTO: 394 10*3/MM3 (ref 140–450)
PMV BLD AUTO: 7.6 FL (ref 6–12)
POTASSIUM SERPL-SCNC: 3.6 MMOL/L (ref 3.5–5.2)
RBC # BLD AUTO: 3.69 10*6/MM3 (ref 3.77–5.28)
SODIUM SERPL-SCNC: 133 MMOL/L (ref 136–145)
WBC NRBC COR # BLD: 9.4 10*3/MM3 (ref 3.4–10.8)

## 2022-05-16 PROCEDURE — 80048 BASIC METABOLIC PNL TOTAL CA: CPT | Performed by: SPECIALIST

## 2022-05-16 PROCEDURE — 85027 COMPLETE CBC AUTOMATED: CPT | Performed by: SPECIALIST

## 2022-06-22 ENCOUNTER — TELEPHONE (OUTPATIENT)
Dept: FAMILY MEDICINE CLINIC | Facility: CLINIC | Age: 57
End: 2022-06-22

## 2022-06-22 NOTE — TELEPHONE ENCOUNTER
Caller: LENNY  (NOT ON  VERBAL)    Relationship to patient: RELATIVE     Best call back number: [phone]    Patient is needing:     LENNY IS COORDINATING CARE FOR CORINNAINM   WOULD LIKE TO KNOW IF GARCIA AHMADI WOULD BE ABLE TO CONTINUE  CARE FOR SIMONA, SHE HAS BEEN DIAGNOSED WITH ALS  AS OF 5/13/2022, SHE IS CURRENTLY ADMITTED TO  FACILITY, WILL BE COMING HOME IN NEXT FEW WEEKS. SAYS SHE HAS A NEUROLOGIST, AND WOULD LIKE TO KNOW IF THEY ARE NEEDING TO SEEK PRIMARY CARE ELSWHERE GIVEN HER DIAGNOSIS     PLEASE ADVISE

## 2022-06-27 NOTE — TELEPHONE ENCOUNTER
-- DO NOT REPLY / DO NOT REPLY ALL --  -- Message is from the [facility]--    General Patient Message      Reason for Call: Veronica calling wanting to advise Dr. Makenna Lizarraga that the chart notes from December 8th  doesn't have that she needs the benefit for CPAP so asking if the February chart notes of office visit for benefiting of being on cpap machine can be faxed to Veronica at fax: [phone]      Caller Information       Type Contact Phone    05/23/2022 11:21 AM CDT Phone (Incoming) [place]  [phone]     [phone] [facility]          Alternative phone number: none    Turnaround time given to caller:   \"This message will be sent to [state Provider's name]. The clinical team will fulfill your request as soon as they review your message.\"     Left message on Joi's voice mail at 10:30am to call office & ask for Anh.

## 2022-06-27 NOTE — TELEPHONE ENCOUNTER
Yes, but most of her care will be coming from neuro. They need to do what they are comfortable with.

## 2022-07-05 ENCOUNTER — TRANSCRIBE ORDERS (OUTPATIENT)
Dept: HOME HEALTH SERVICES | Facility: HOME HEALTHCARE | Age: 57
End: 2022-07-05

## 2022-07-05 ENCOUNTER — HOME HEALTH ADMISSION (OUTPATIENT)
Dept: HOME HEALTH SERVICES | Facility: HOME HEALTHCARE | Age: 57
End: 2022-07-05

## 2022-07-05 DIAGNOSIS — G12.21 ALS (AMYOTROPHIC LATERAL SCLEROSIS): Primary | ICD-10-CM

## 2022-07-20 ENCOUNTER — TELEPHONE (OUTPATIENT)
Dept: FAMILY MEDICINE CLINIC | Facility: CLINIC | Age: 57
End: 2022-07-20

## 2022-07-20 NOTE — TELEPHONE ENCOUNTER
I have not seen this pt since last year. If she is having acute stomach issues and may need imaging, she should go to ER.

## 2022-07-20 NOTE — TELEPHONE ENCOUNTER
CELYA CALLED AND STATED THE PATIENT CAME OUT OF REHAB ON 7/10/22. STATED THAT SHE HAS BEEN COMPLAINING OF HER STOMACH CRAMPING, WAS GIVEN A MEDICATION SIMILAR TO EXLAX, STATED THE PATIENT HAS HAD BOWEL MOVEMENTS THE LAST TWO DAYS AND IS STILL COMPLAINING OF CRAMPING .      WAS TOLD TO USE LIDODERM PAIN PATCHES, STATED SHE WAS NOT PRESCRIBED ANY. PATIENT HAS BEEN DIAGNOSED WITH ALS AND HYPERTENSION.    PATIENT WAS IN [facility]  IN [place].     [phone]

## 2022-09-22 ENCOUNTER — LAB REQUISITION (OUTPATIENT)
Dept: LAB | Facility: HOSPITAL | Age: 57
End: 2022-09-22

## 2022-09-22 DIAGNOSIS — I10 PRIMARY HYPERTENSION: ICD-10-CM

## 2022-09-22 DIAGNOSIS — F41.9 ANXIETY DISORDER, UNSPECIFIED: ICD-10-CM

## 2022-09-22 LAB
AMPHET+METHAMPHET UR QL: NEGATIVE
BARBITURATES UR QL SCN: NEGATIVE
BENZODIAZ UR QL SCN: NEGATIVE
CANNABINOIDS SERPL QL: NEGATIVE
COCAINE UR QL: NEGATIVE
METHADONE UR QL SCN: NEGATIVE
OPIATES UR QL: NEGATIVE
OXYCODONE UR QL SCN: NEGATIVE

## 2022-09-22 PROCEDURE — 80307 DRUG TEST PRSMV CHEM ANLYZR: CPT | Performed by: PHYSICAL MEDICINE & REHABILITATION

## 2022-09-23 RX ORDER — LOSARTAN POTASSIUM 25 MG/1
TABLET ORAL
Qty: 30 TABLET | Refills: 3 | Status: SHIPPED | OUTPATIENT
Start: 2022-09-23

## (undated) DEVICE — SMOKE EVACUATION TUBING WITH 7/8 IN TO 1/4 IN REDUCER: Brand: BUFFALO FILTER

## (undated) DEVICE — SLV SCD CALF HEMOFORCE DVT THERP REPROC MD

## (undated) DEVICE — GLV SURG SIGNATURE ESSENTIAL PF LTX SZ7.5

## (undated) DEVICE — Device

## (undated) DEVICE — GLV SURG BIOGEL LTX PF 8

## (undated) DEVICE — SUT VIC 3/0 SH CR8 18IN J864D

## (undated) DEVICE — BITEBLOCK ENDO W/STRAP 60F A/ LF DISP

## (undated) DEVICE — DRP C/ARMOR

## (undated) DEVICE — SINGLE-USE BIOPSY FORCEPS: Brand: RADIAL JAW 4

## (undated) DEVICE — TOTAL TRAY, DB, 100% SILI FOLEY, 16FR 10: Brand: MEDLINE

## (undated) DEVICE — SUT MNCRYL 3/0 PS2 18IN Y497G

## (undated) DEVICE — SOLUTION,WATER,IRRIGATION,1000ML,STERILE: Brand: MEDLINE

## (undated) DEVICE — 3.0MM PRECISION NEURO (MATCH HEAD)

## (undated) DEVICE — KT SURG TURNOVER 050

## (undated) DEVICE — DRP OPMI 326071

## (undated) DEVICE — DISTRACT SCRW 14MM STRL

## (undated) DEVICE — DRSNG WND BORDR/ADHS NONADHR/GZ LF 4X4IN STRL

## (undated) DEVICE — PERCUTANEOUS ENDOSCOPIC GASTROSTOMY KIT: Brand: ENDOVIVE SAFETY PEG KIT

## (undated) DEVICE — GLV SURG BIOGEL M LTX PF 7 1/2

## (undated) DEVICE — DECANTER: Brand: UNBRANDED

## (undated) DEVICE — INTENDED FOR TISSUE SEPARATION, AND OTHER PROCEDURES THAT REQUIRE A SHARP SURGICAL BLADE TO PUNCTURE OR CUT.: Brand: BARD-PARKER ® CARBON RIB-BACK BLADES

## (undated) DEVICE — PK ENDO GI 50

## (undated) DEVICE — UNDERGLV SURG BIOGEL INDICATOR LF PF 7.5

## (undated) DEVICE — PK BASIC SPINE 50

## (undated) DEVICE — ADAPT FEED PEG ENDOVIVE Y/PRT ENFIT SI 20F

## (undated) DEVICE — UNDERGLV SURG BIOGEL INDICAT PI SZ8 BLU

## (undated) DEVICE — SOL IRRIG NACL 1000ML

## (undated) DEVICE — ELECTRD BLD EZ CLN MOD 4IN

## (undated) DEVICE — PENCL HND ROCKRSWTCH HOLSTR EZ CLEAN TP CRD 10FT